# Patient Record
Sex: FEMALE | Race: WHITE
[De-identification: names, ages, dates, MRNs, and addresses within clinical notes are randomized per-mention and may not be internally consistent; named-entity substitution may affect disease eponyms.]

---

## 2017-09-10 ENCOUNTER — HOSPITAL ENCOUNTER (INPATIENT)
Dept: HOSPITAL 45 - C.2T | Age: 72
LOS: 14 days | Discharge: HOME | DRG: 167 | End: 2017-09-24
Attending: HOSPITALIST | Admitting: HOSPITALIST
Payer: COMMERCIAL

## 2017-09-10 VITALS
BODY MASS INDEX: 34.61 KG/M2 | BODY MASS INDEX: 34.61 KG/M2 | HEIGHT: 63 IN | WEIGHT: 195.33 LBS | WEIGHT: 195.33 LBS | HEIGHT: 63 IN

## 2017-09-10 VITALS
HEART RATE: 76 BPM | OXYGEN SATURATION: 93 % | SYSTOLIC BLOOD PRESSURE: 125 MMHG | TEMPERATURE: 99.14 F | DIASTOLIC BLOOD PRESSURE: 79 MMHG

## 2017-09-10 VITALS
TEMPERATURE: 98.24 F | DIASTOLIC BLOOD PRESSURE: 77 MMHG | SYSTOLIC BLOOD PRESSURE: 124 MMHG | HEART RATE: 69 BPM | OXYGEN SATURATION: 98 %

## 2017-09-10 DIAGNOSIS — Z79.02: ICD-10-CM

## 2017-09-10 DIAGNOSIS — Z79.82: ICD-10-CM

## 2017-09-10 DIAGNOSIS — Z82.49: ICD-10-CM

## 2017-09-10 DIAGNOSIS — E87.6: ICD-10-CM

## 2017-09-10 DIAGNOSIS — Z88.5: ICD-10-CM

## 2017-09-10 DIAGNOSIS — J44.0: ICD-10-CM

## 2017-09-10 DIAGNOSIS — J85.0: ICD-10-CM

## 2017-09-10 DIAGNOSIS — J90: ICD-10-CM

## 2017-09-10 DIAGNOSIS — J85.2: Primary | ICD-10-CM

## 2017-09-10 DIAGNOSIS — I25.10: ICD-10-CM

## 2017-09-10 DIAGNOSIS — Z88.2: ICD-10-CM

## 2017-09-10 DIAGNOSIS — E87.1: ICD-10-CM

## 2017-09-10 DIAGNOSIS — Z79.899: ICD-10-CM

## 2017-09-10 DIAGNOSIS — Z88.0: ICD-10-CM

## 2017-09-10 DIAGNOSIS — Z82.5: ICD-10-CM

## 2017-09-10 DIAGNOSIS — I11.9: ICD-10-CM

## 2017-09-10 DIAGNOSIS — K21.9: ICD-10-CM

## 2017-09-10 DIAGNOSIS — J93.12: ICD-10-CM

## 2017-09-10 DIAGNOSIS — Z95.1: ICD-10-CM

## 2017-09-10 DIAGNOSIS — F17.210: ICD-10-CM

## 2017-09-10 DIAGNOSIS — Z88.1: ICD-10-CM

## 2017-09-10 DIAGNOSIS — Z83.3: ICD-10-CM

## 2017-09-10 DIAGNOSIS — E78.00: ICD-10-CM

## 2017-09-10 DIAGNOSIS — M79.89: ICD-10-CM

## 2017-09-10 LAB
ALBUMIN/GLOB SERPL: 1 {RATIO} (ref 0.9–2)
ALP SERPL-CCNC: 102 U/L (ref 45–117)
ALT SERPL-CCNC: 15 U/L (ref 12–78)
ANION GAP SERPL CALC-SCNC: 8 MMOL/L (ref 3–11)
AST SERPL-CCNC: 13 U/L (ref 15–37)
BASOPHILS # BLD: 0.04 K/UL (ref 0–0.2)
BASOPHILS NFR BLD: 0.3 %
BUN SERPL-MCNC: 7 MG/DL (ref 7–18)
BUN/CREAT SERPL: 9.2 (ref 10–20)
CALCIUM SERPL-MCNC: 9.1 MG/DL (ref 8.5–10.1)
CHLORIDE SERPL-SCNC: 99 MMOL/L (ref 98–107)
CO2 SERPL-SCNC: 29 MMOL/L (ref 21–32)
COMPLETE: YES
CREAT CL PREDICTED SERPL C-G-VRATE: 69.2 ML/MIN
CREAT SERPL-MCNC: 0.78 MG/DL (ref 0.6–1.2)
EOSINOPHIL NFR BLD AUTO: 328 K/UL (ref 130–400)
GLOBULIN SER-MCNC: 3.3 GM/DL (ref 2.5–4)
GLUCOSE SERPL-MCNC: 121 MG/DL (ref 70–99)
HCT VFR BLD CALC: 37.4 % (ref 37–47)
IG%: 0.3 %
IMM GRANULOCYTES NFR BLD AUTO: 15 %
INR PPP: 1 (ref 0.9–1.1)
LYMPHOCYTES # BLD: 2.3 K/UL (ref 1.2–3.4)
MCH RBC QN AUTO: 31.9 PG (ref 25–34)
MCHC RBC AUTO-ENTMCNC: 34.5 G/DL (ref 32–36)
MCV RBC AUTO: 92.3 FL (ref 80–100)
MONOCYTES NFR BLD: 7.7 %
NEUTROPHILS # BLD AUTO: 2.5 %
NEUTROPHILS NFR BLD AUTO: 74.2 %
PMV BLD AUTO: 9.2 FL (ref 7.4–10.4)
POTASSIUM SERPL-SCNC: 3.2 MMOL/L (ref 3.5–5.1)
PROTHROMBIN TIME: 10.7 SECONDS (ref 9–12)
RBC # BLD AUTO: 4.05 M/UL (ref 4.2–5.4)
SODIUM SERPL-SCNC: 136 MMOL/L (ref 136–145)
WBC # BLD AUTO: 15.33 K/UL (ref 4.8–10.8)

## 2017-09-10 RX ADMIN — FLUTICASONE PROPIONATE AND SALMETEROL SCH PUFF: 50; 250 POWDER RESPIRATORY (INHALATION) at 22:18

## 2017-09-10 RX ADMIN — LORAZEPAM PRN MG: 1 TABLET ORAL at 19:54

## 2017-09-10 RX ADMIN — METOPROLOL TARTRATE SCH MG: 50 TABLET, FILM COATED ORAL at 22:00

## 2017-09-10 NOTE — HISTORY AND PHYSICAL
History & Physical


Date & Time of Service:


Sep 10, 2017 at 21:56


Chief Complaint:


Pneumothorax, Chest Tube, L Lung Absess


Primary Care Physician:


Heather Cheney


History of Present Illness


Source:  patient, clinic records, hospital records


71 year old female with history of CAD/CABG, HTN, COPD presenting with left 

sided chest pain.





Patient was at her usual state of health until a few weeks ago when she started 

to have dry cough.


She was then prescribed by her PCP a course of Prednisone and Azithromycin. As 

per patient, her symptoms have improved since then


but then had increasing cough the past few days.


Last night, the patient was coughing and suddenly had left sided chest pain - 

felt a like a pulled muscle so she applied Aspercream with some improvement.


This morning the pain and cough persisted prompting consult at the [facility].


She was found to have by CXR a left sided pneumothorax with left loculated 

effusion.


A chest tube was placed and patient requested transfer to [facility].





Patient received with stable VS, more than 90% on 2 liters nasal cannula.


Repeat CXR showed :  Left-sided chest tube placement with the tube possibly 

outside of


the chest wall cavity in the left. Kink of the tube at its superior margin.


Considerable subcutaneous emphysematous change. Parenchymal infiltrate left


base. This tube should be repositioned. 





On exam, patient seen resting in bed, not in respiratory distress, no accessory 

muscles, speaks in sentences with no effort.


Main symptom is pain on the chest tube insertion site.


Denies other symptoms





Past Medical/Surgical History


Medical Problems:


(1) Bronchitis


Status: Chronic  





(2) Heart disease


Status: Chronic  





(3) Hypertension


Status: Chronic  





(4) Lumbar stenosis with neurogenic claudication


Status: Resolved  





(5) Post-operative pain


Status: Resolved  





(6) Stomach problems


Status: Chronic  





Surgical Problems:


(1) H/O heart surgery


Status: Resolved  





(2) H/O laminectomy


Status: Resolved  





(3) H/O: hysterectomy


Status: Resolved  











Family History





FH: diabetes mellitus


FH: heart disease


FH: hypertension


FH: lung disease





Social History


Smoking Status:  Current Every Day Smoker


Smokeless Tobacco Use:  No


Alcohol Use:  none


Drug Use:  none


Marital Status:  


Housing status:  lives with family


Occupational Status:  retired





Allergies


Coded Allergies:  


     Cephalexin (Verified  Allergy, Mild, Hives, 8/3/15)


     Adhesives (Verified  Allergy, Unknown, RASH, 8/3/15)


     Penicillins (Verified  Allergy, Unknown, ., 8/3/15)


     Sulfa Antibiotics (Verified  Allergy, Unknown, ., 8/3/15)


     Vancomycin (Verified  Allergy, Unknown, RASH, 9/10/17)


     Oxycodone (Verified  Adverse Reaction, Unknown, itchy TO ROXICET. Takes 

oxycodone at home., 9/10/17)





Home Medications


Scheduled


Amlodipine (Norvasc), 2.5 MG PO QAM


Aspirin (Aspir-81), 81 MG PO HS


Atorvastatin (Lipitor), 10 MG PO HS


Budesonide/Formoterol Fumarate (Symbicort 160/4.5 Inhaler ), 2 PUFFS INH BID


Cholecalciferol (Vitamin D3), 1 TAB PO DAILY


Clopidogrel (Plavix), 75 MG PO QAM


Docusate Sodium (Stool Softener), 200 MG PO DAILY


Esomeprazole Magnesium (Nexium), 40 MG PO DAILY


Gabapentin (Gabapentin), 1 CAP PO QID


Ipratropium Bromide (Ipratropium Bromide), 1 VIAL NEB QID


Losartan Potassium (Cozaar), 100 MG PO QAM


Magnesium Oxide (Mag-Ox), 400 MG PO BID


Metoprolol Tartrate (Lopressor) (Lopressor), 50 MG PO BID


Montelukast Sodium (Singulair), 10 MG PO HS


Multivitamin (Multivitamin), 1 TAB PO QAM


Ranitidine (Zantac), 150 MG PO HS





Scheduled PRN


Albuterol (Proair Hfa), 2 PUFFS INH Q4 PRN for SOB/Wheezing


Alprazolam (Alprazolam), 0.5 MG PO HS PRN for Anxiety


Carisoprodol (Soma), 350 MG PO TID PRN for Muscle Spasms


Oxycodone HCl (Oxycodone HCl), 5 MG PO Q4H PRN for moderate pain (pain scale 4-6

)





Miscellaneous Medications


Tiotropium Bromide (Spiriva Respimat), 1 PUFF INH





Review of Systems


Constitutional- no fever; no weight loss


Eyes- no acute visual changes


ENT- no sinus drainage; no pharyngitis


Pulmonary- (+) as noted above


Cardiac- no chest pain, no palpitations, no orthopnea, no dependent edema


GI- no nausea, no vomiting, no diarrhea, no melena, no hematochezia


- no dysuria, no hematuria 


Musculoskeletal- no arthralgias, no myalgias


Derm- no rashes, no new skin lesions, no changing skin lesions


Hematologic- no unusual bruising, no unusual bleeding 


Lymphatics- no adenopathy 


Endocrine- no polyuria or polydipsia; no heat or cold intolerance


Neuro- no headaches, no focal neurologic symptoms


Psych- no anxiety, no depression





Physical Exam


Vital Signs











  Date Time  Temp Pulse Resp B/P (MAP) Pulse Ox O2 Delivery O2 Flow Rate FiO2


 


9/10/17 19:32 36.8 69 22 124/77 98 Nasal Cannula 2.0 


 


9/10/17 19:10 37.3 76 21 125/79 (94) 93 Nasal Cannula 3.5 








General Appearance:  WD/WN, no apparent distress


Head:  normocephalic, atraumatic


Eyes:  normal inspection, EOMI, sclerae normal


ENT:  normal ENT inspection, hearing grossly normal, pharynx normal


Neck:  supple, no adenopathy, thyroid normal, no JVD, trachea midline


Respiratory/Chest:  + pertinent finding ((+) chest tube inserted on the left 

chest wall, no signs of active bleeding; (+) mild wheeze and rales bilateral 

bases right >left)


Cardiovascular:  regular rate, rhythm, no edema, no JVD, no murmur


Abdomen/GI:  normal bowel sounds, non tender, soft, no organomegaly


Back:  normal inspection, no CVA tenderness


Extremities/Musculoskelatal:  normal inspection, no calf tenderness, normal 

capillary refill, no pedal edema


Neurologic/Psych:  CNs II-XII nml as tested, no motor/sensory deficits, alert, 

normal mood/affect, oriented x 3


Skin:  normal color, warm/dry, no rash


Lymphatic:  no adenopathy





Diagnostics


Laboratory Results





Results Past 24 Hours








Test


  9/10/17


19:02 9/10/17


19:31 9/10/17


19:37 Range/Units


 


 


White Blood Count  15.33  4.8-10.8  K/uL


 


Red Blood Count  4.05  4.2-5.4  M/uL


 


Hemoglobin  12.9  12.0-16.0  g/dL


 


Hematocrit  37.4  37-47  %


 


Mean Corpuscular Volume  92.3    fL


 


Mean Corpuscular Hemoglobin  31.9  25-34  pg


 


Mean Corpuscular Hemoglobin


Concent 


  34.5


  


  32-36  g/dl


 


 


Platelet Count  328  130-400  K/uL


 


Mean Platelet Volume  9.2  7.4-10.4  fL


 


Neutrophils (%) (Auto)  74.2   %


 


Lymphocytes (%) (Auto)  15.0   %


 


Monocytes (%) (Auto)  7.7   %


 


Eosinophils (%) (Auto)  2.5   %


 


Basophils (%) (Auto)  0.3   %


 


Neutrophils # (Auto)  11.38  1.4-6.5  K/uL


 


Lymphocytes # (Auto)  2.30  1.2-3.4  K/uL


 


Monocytes # (Auto)  1.18  0.11-0.59  K/uL


 


Eosinophils # (Auto)  0.38  0-0.5  K/uL


 


Basophils # (Auto)  0.04  0-0.2  K/uL


 


RDW Standard Deviation  45.3  36.4-46.3  fL


 


RDW Coefficient of Variation  13.4  11.5-14.5  %


 


Immature Granulocyte % (Auto)  0.3   %


 


Immature Granulocyte # (Auto)  0.05  0.00-0.02  K/uL


 


Prothrombin Time


  


  10.7


  


  9.0-12.0


SECONDS


 


Prothromb Time International


Ratio 


  1.0


  


  0.9-1.1  


 


 


Sodium Level  136  136-145  mmol/L


 


Potassium Level  3.2  3.5-5.1  mmol/L


 


Chloride Level  99    mmol/L


 


Carbon Dioxide Level  29  21-32  mmol/L


 


Anion Gap  8.0  3-11  mmol/L


 


Blood Urea Nitrogen  7  7-18  mg/dl


 


Creatinine


  


  0.78


  


  0.60-1.20


mg/dl


 


Est Creatinine Clear Calc


Drug Dose 


  69.2


  


   ml/min


 


 


Estimated GFR (


American) 


  89.3


  


   


 


 


Estimated GFR (Non-


American 


  77.0


  


   


 


 


BUN/Creatinine Ratio  9.2  10-20  


 


Random Glucose  121  70-99  mg/dl


 


Calcium Level  9.1  8.5-10.1  mg/dl


 


Total Bilirubin  0.6  0.2-1  mg/dl


 


Aspartate Amino Transf


(AST/SGOT) 


  13


  


  15-37  U/L


 


 


Alanine Aminotransferase


(ALT/SGPT) 


  15


  


  12-78  U/L


 


 


Alkaline Phosphatase  102    U/L


 


Total Protein  6.7  6.4-8.2  gm/dl


 


Albumin  3.4  3.4-5.0  gm/dl


 


Globulin  3.3  2.5-4.0  gm/dl


 


Albumin/Globulin Ratio  1.0  0.9-2  


 


Lactic Acid Level   1.1 0.4-2.0  mmol/L








Microbiology Results


9/10/17 Blood Culture, Received


          Pending


9/10/17 Blood Culture, Received


          Pending


9/10/17 MRSA DNA Surveillance Screen, Ordered


          Pending


9/10/17 Gram Stain, Ordered


          Pending


9/10/17 Sputum Culture, Ordered


          Pending





Diagnostic Radiology


per H&P





Impression


Assessment and Plan


71 year old female with history of CAD/CABG, HTN, COPD presenting with left 

sided chest pain.





LEFT SIDED PNEUMOTHORAX


LEFT LOWER LOBE PNEUMONIA VS. EFFUSION


- HD stable, maintaining adequate oxygenation on 2 liters NC  


 no dyspnea


- may need chest tube removal and reinsertion


- discussed with Dr. Frazier


  CT chest stat ordered


- will start Doxy + Aztreonam, Solumedrol 40mg q8h, Nebs q4h


- monitor in Tele





COPD


- possible mild COPD exacerbation


- management as noted above





CAD/CABG


- hold ASA, Plavix for chest tube insertion


- continue Losartan, Metoprolol


- hold Lasix for now





HTN


- continue Losartan, Metoprolol


- hold Lasix for now





DVT PROPHYLAXIS


SCDs for now





FULL CODE PER PATIENT





DISPOSITION


anticipate d/c home when medically stable





Advanced Directives


Existing Living Will:  No


Existing Power of :  No





VTE Prophylaxis


VTE Risk Assessment Done? Y/N:  Yes


Risk Level:  Moderate


Given or contraindicated:  SCD's

## 2017-09-10 NOTE — DIAGNOSTIC IMAGING REPORT
(CHEST) THORAX WITHOUT



CT DOSE: 527.16 mGy.cm



HISTORY: Tube position  pneumothorax, s/p chest tube placement



TECHNIQUE: Multiaxial CT images of the chest were performed without contrast.  A

dose lowering technique was utilized adhering to the principles of ALARA.





COMPARISON: None.



FINDINGS: There is a left-sided chest tube. The tube is extrinsic to the left

chest wall. It contains a kink in its distal aspect as well as a kink medially

deep to its insertion site. The entirety of the catheter is within the chest

wall laterally. This catheter should be removed.



There is a left-sided pneumothorax as well as a small left effusion. Estimated

volume is approximately 30%. There is no significant cardiomediastinal

silhouette shift.



IMPRESSION: 



1. Left-sided chest tube is located entirely within the subcutaneous fat and

soft tissues of the chest wall itself..

2. There is no evidence for extension of any component of tube to the chest

cavity itself.

3. 30% left-sided pneumothorax.





4. Extensive subcutaneous emphysema over the left hemithorax.





5. The chest tube within the soft tissues contains at least 2 kinks

6. This tube should be removed

7. Small left effusion with partial left lower lobe atelectatic change. 









The above report was generated using voice recognition software.  It may contain

grammatical, syntax or spelling errors.







Electronically signed by:  Brice Oreilly M.D.

9/10/2017 9:59 PM



Dictated Date/Time:  9/10/2017 9:55 PM

## 2017-09-10 NOTE — DIAGNOSTIC IMAGING REPORT
CHEST ONE VIEW PORTABLE



CLINICAL HISTORY: sob /CHEST TUBE PLACEMENT  dyspnea. Tube position.



COMPARISON STUDY:  5/20/2015



FINDINGS: Placement of a left-sided chest tube. Bulk of the tube appears to be

extrathoracic. There is a kink in the tubing superior margin adjacent to the

anterior aspect of left second rib. There is no evidence pneumothorax.

Subcutaneous emphysematous changes are noted.. There is a parenchymal infiltrate

left lung base. Lungs otherwise appear clear. 



IMPRESSION:  Left-sided chest tube placement with the tube possibly outside of

the chest wall cavity in the left. Kink of the tube at its superior margin.

Considerable subcutaneous emphysematous change. Parenchymal infiltrate left

base. This tube should be repositioned. 











The above report was generated using voice recognition software.  It may contain

grammatical, syntax or spelling errors.









Electronically signed by:  Brice Oreilly M.D.

9/10/2017 7:39 PM



Dictated Date/Time:  9/10/2017 7:37 PM

## 2017-09-11 VITALS
DIASTOLIC BLOOD PRESSURE: 84 MMHG | SYSTOLIC BLOOD PRESSURE: 131 MMHG | TEMPERATURE: 97.88 F | HEART RATE: 83 BPM | OXYGEN SATURATION: 94 %

## 2017-09-11 VITALS
HEART RATE: 74 BPM | TEMPERATURE: 98.24 F | OXYGEN SATURATION: 96 % | SYSTOLIC BLOOD PRESSURE: 154 MMHG | DIASTOLIC BLOOD PRESSURE: 76 MMHG

## 2017-09-11 VITALS
TEMPERATURE: 98.96 F | SYSTOLIC BLOOD PRESSURE: 146 MMHG | DIASTOLIC BLOOD PRESSURE: 87 MMHG | HEART RATE: 88 BPM | OXYGEN SATURATION: 95 %

## 2017-09-11 VITALS
DIASTOLIC BLOOD PRESSURE: 82 MMHG | OXYGEN SATURATION: 97 % | TEMPERATURE: 98.06 F | HEART RATE: 68 BPM | SYSTOLIC BLOOD PRESSURE: 148 MMHG

## 2017-09-11 VITALS — OXYGEN SATURATION: 98 % | HEART RATE: 76 BPM

## 2017-09-11 VITALS
HEART RATE: 81 BPM | TEMPERATURE: 98.42 F | OXYGEN SATURATION: 97 % | SYSTOLIC BLOOD PRESSURE: 168 MMHG | DIASTOLIC BLOOD PRESSURE: 70 MMHG

## 2017-09-11 VITALS
DIASTOLIC BLOOD PRESSURE: 79 MMHG | HEART RATE: 78 BPM | OXYGEN SATURATION: 97 % | SYSTOLIC BLOOD PRESSURE: 125 MMHG | TEMPERATURE: 98.06 F

## 2017-09-11 VITALS — OXYGEN SATURATION: 92 % | HEART RATE: 82 BPM

## 2017-09-11 VITALS — OXYGEN SATURATION: 97 %

## 2017-09-11 VITALS
SYSTOLIC BLOOD PRESSURE: 135 MMHG | TEMPERATURE: 97.88 F | HEART RATE: 76 BPM | DIASTOLIC BLOOD PRESSURE: 77 MMHG | OXYGEN SATURATION: 92 %

## 2017-09-11 VITALS — HEART RATE: 83 BPM | OXYGEN SATURATION: 91 %

## 2017-09-11 VITALS — HEART RATE: 70 BPM | OXYGEN SATURATION: 94 %

## 2017-09-11 VITALS
OXYGEN SATURATION: 95 % | TEMPERATURE: 98.06 F | DIASTOLIC BLOOD PRESSURE: 74 MMHG | HEART RATE: 70 BPM | SYSTOLIC BLOOD PRESSURE: 124 MMHG

## 2017-09-11 VITALS — OXYGEN SATURATION: 95 % | HEART RATE: 73 BPM

## 2017-09-11 VITALS — HEART RATE: 70 BPM | OXYGEN SATURATION: 97 %

## 2017-09-11 LAB
ANION GAP SERPL CALC-SCNC: 7 MMOL/L (ref 3–11)
APPEARANCE UR: CLEAR
BILIRUB UR-MCNC: (no result) MG/DL
BUN SERPL-MCNC: 10 MG/DL (ref 7–18)
BUN/CREAT SERPL: 12.4 (ref 10–20)
CALCIUM SERPL-MCNC: 9.1 MG/DL (ref 8.5–10.1)
CHLORIDE SERPL-SCNC: 98 MMOL/L (ref 98–107)
CO2 SERPL-SCNC: 29 MMOL/L (ref 21–32)
COLOR UR: YELLOW
CREAT CL PREDICTED SERPL C-G-VRATE: 67.4 ML/MIN
CREAT SERPL-MCNC: 0.8 MG/DL (ref 0.6–1.2)
EOSINOPHIL NFR BLD AUTO: 323 K/UL (ref 130–400)
GLUCOSE SERPL-MCNC: 169 MG/DL (ref 70–99)
HCT VFR BLD CALC: 38.2 % (ref 37–47)
MAGNESIUM SERPL-MCNC: 1.8 MG/DL (ref 1.8–2.4)
MANUAL MICROSCOPIC REQUIRED?: NO
MCH RBC QN AUTO: 30.6 PG (ref 25–34)
MCHC RBC AUTO-ENTMCNC: 33.5 G/DL (ref 32–36)
MCV RBC AUTO: 91.4 FL (ref 80–100)
NITRITE UR QL STRIP: (no result)
PH UR STRIP: 5.5 [PH] (ref 4.5–7.5)
PMV BLD AUTO: 9.2 FL (ref 7.4–10.4)
POTASSIUM SERPL-SCNC: 3.6 MMOL/L (ref 3.5–5.1)
RBC # BLD AUTO: 4.18 M/UL (ref 4.2–5.4)
REVIEW REQ?: NO
SODIUM SERPL-SCNC: 134 MMOL/L (ref 136–145)
SP GR UR STRIP: 1.02 (ref 1–1.03)
URINE BILL WITH OR WITHOUT MIC: (no result)
UROBILINOGEN UR-MCNC: (no result) MG/DL
WBC # BLD AUTO: 14.28 K/UL (ref 4.8–10.8)
ZZUR CULT IF INDIC CLEAN CATCH: NO

## 2017-09-11 PROCEDURE — 0W9830Z DRAINAGE OF CHEST WALL WITH DRAINAGE DEVICE, PERCUTANEOUS APPROACH: ICD-10-PCS | Performed by: INTERNAL MEDICINE

## 2017-09-11 RX ADMIN — LOSARTAN POTASSIUM SCH MG: 50 TABLET, FILM COATED ORAL at 08:27

## 2017-09-11 RX ADMIN — MONTELUKAST SODIUM SCH MG: 10 TABLET, FILM COATED ORAL at 00:35

## 2017-09-11 RX ADMIN — ATORVASTATIN CALCIUM SCH MG: 10 TABLET, FILM COATED ORAL at 21:23

## 2017-09-11 RX ADMIN — LEVALBUTEROL SCH MG: 1.25 SOLUTION, CONCENTRATE RESPIRATORY (INHALATION) at 03:20

## 2017-09-11 RX ADMIN — Medication SCH TAB: at 08:25

## 2017-09-11 RX ADMIN — FLUTICASONE PROPIONATE AND SALMETEROL SCH PUFF: 50; 250 POWDER RESPIRATORY (INHALATION) at 21:00

## 2017-09-11 RX ADMIN — BUPROPION HYDROCHLORIDE SCH MG: 100 TABLET, FILM COATED, EXTENDED RELEASE ORAL at 00:40

## 2017-09-11 RX ADMIN — LEVALBUTEROL SCH MG: 1.25 SOLUTION, CONCENTRATE RESPIRATORY (INHALATION) at 23:07

## 2017-09-11 RX ADMIN — METOPROLOL TARTRATE SCH MG: 50 TABLET, FILM COATED ORAL at 15:18

## 2017-09-11 RX ADMIN — OXYCODONE HYDROCHLORIDE PRN MG: 5 TABLET ORAL at 08:21

## 2017-09-11 RX ADMIN — IMIPENEM AND CILASTATIN SODIUM SCH MLS/HR: 500; 500 INJECTION, POWDER, FOR SOLUTION INTRAVENOUS at 22:31

## 2017-09-11 RX ADMIN — RANITIDINE SCH MG: 150 TABLET ORAL at 21:23

## 2017-09-11 RX ADMIN — OXYCODONE HYDROCHLORIDE PRN MG: 5 TABLET ORAL at 17:56

## 2017-09-11 RX ADMIN — GABAPENTIN SCH MG: 300 CAPSULE ORAL at 15:18

## 2017-09-11 RX ADMIN — BUPROPION HYDROCHLORIDE SCH MG: 100 TABLET, FILM COATED, EXTENDED RELEASE ORAL at 21:00

## 2017-09-11 RX ADMIN — OXYCODONE HYDROCHLORIDE PRN MG: 5 TABLET ORAL at 22:45

## 2017-09-11 RX ADMIN — METOPROLOL TARTRATE SCH MG: 50 TABLET, FILM COATED ORAL at 00:34

## 2017-09-11 RX ADMIN — MONTELUKAST SODIUM SCH MG: 10 TABLET, FILM COATED ORAL at 21:23

## 2017-09-11 RX ADMIN — KETOROLAC TROMETHAMINE SCH MG: 15 INJECTION INTRAMUSCULAR; INTRAVENOUS at 17:57

## 2017-09-11 RX ADMIN — BUDESONIDE AND FORMOTEROL FUMARATE DIHYDRATE SCH PUFFS: 80; 4.5 AEROSOL RESPIRATORY (INHALATION) at 15:22

## 2017-09-11 RX ADMIN — ATORVASTATIN CALCIUM SCH MG: 10 TABLET, FILM COATED ORAL at 00:36

## 2017-09-11 RX ADMIN — LEVALBUTEROL SCH MG: 1.25 SOLUTION, CONCENTRATE RESPIRATORY (INHALATION) at 07:13

## 2017-09-11 RX ADMIN — Medication SCH MG: at 00:35

## 2017-09-11 RX ADMIN — FLUTICASONE PROPIONATE AND SALMETEROL SCH PUFF: 50; 250 POWDER RESPIRATORY (INHALATION) at 08:21

## 2017-09-11 RX ADMIN — LEVALBUTEROL SCH MG: 1.25 SOLUTION, CONCENTRATE RESPIRATORY (INHALATION) at 20:21

## 2017-09-11 RX ADMIN — GABAPENTIN SCH MG: 300 CAPSULE ORAL at 21:23

## 2017-09-11 RX ADMIN — IPRATROPIUM BROMIDE SCH MG: 0.5 SOLUTION RESPIRATORY (INHALATION) at 07:13

## 2017-09-11 RX ADMIN — LORAZEPAM PRN MG: 1 TABLET ORAL at 21:40

## 2017-09-11 RX ADMIN — KETOROLAC TROMETHAMINE SCH MG: 15 INJECTION INTRAMUSCULAR; INTRAVENOUS at 12:18

## 2017-09-11 RX ADMIN — TIOTROPIUM BROMIDE SCH PUFF: 18 CAPSULE ORAL; RESPIRATORY (INHALATION) at 08:28

## 2017-09-11 RX ADMIN — IPRATROPIUM BROMIDE SCH MG: 0.5 SOLUTION RESPIRATORY (INHALATION) at 03:20

## 2017-09-11 RX ADMIN — OXYCODONE HYDROCHLORIDE PRN MG: 5 TABLET ORAL at 01:31

## 2017-09-11 RX ADMIN — IMIPENEM AND CILASTATIN SODIUM SCH MLS/HR: 500; 500 INJECTION, POWDER, FOR SOLUTION INTRAVENOUS at 15:22

## 2017-09-11 RX ADMIN — DOCUSATE SODIUM SCH MG: 100 CAPSULE, LIQUID FILLED ORAL at 08:26

## 2017-09-11 RX ADMIN — IPRATROPIUM BROMIDE SCH MG: 0.5 SOLUTION RESPIRATORY (INHALATION) at 11:10

## 2017-09-11 RX ADMIN — METOPROLOL TARTRATE SCH MG: 50 TABLET, FILM COATED ORAL at 08:24

## 2017-09-11 RX ADMIN — BUDESONIDE AND FORMOTEROL FUMARATE DIHYDRATE SCH PUFFS: 80; 4.5 AEROSOL RESPIRATORY (INHALATION) at 21:23

## 2017-09-11 RX ADMIN — LEVALBUTEROL SCH MG: 1.25 SOLUTION, CONCENTRATE RESPIRATORY (INHALATION) at 11:10

## 2017-09-11 RX ADMIN — KETOROLAC TROMETHAMINE SCH MG: 15 INJECTION INTRAMUSCULAR; INTRAVENOUS at 06:16

## 2017-09-11 RX ADMIN — GABAPENTIN SCH MG: 300 CAPSULE ORAL at 08:24

## 2017-09-11 RX ADMIN — IPRATROPIUM BROMIDE SCH MG: 0.5 SOLUTION RESPIRATORY (INHALATION) at 20:21

## 2017-09-11 RX ADMIN — RANITIDINE SCH MG: 150 TABLET ORAL at 00:34

## 2017-09-11 RX ADMIN — GABAPENTIN SCH MG: 300 CAPSULE ORAL at 00:35

## 2017-09-11 RX ADMIN — Medication SCH MG: at 21:23

## 2017-09-11 RX ADMIN — NICOTINE SCH PATCH: 7 PATCH, EXTENDED RELEASE TRANSDERMAL at 08:29

## 2017-09-11 RX ADMIN — BUPROPION HYDROCHLORIDE SCH MG: 100 TABLET, FILM COATED, EXTENDED RELEASE ORAL at 08:23

## 2017-09-11 RX ADMIN — PANTOPRAZOLE SCH MG: 40 TABLET, DELAYED RELEASE ORAL at 08:25

## 2017-09-11 RX ADMIN — BUPROPION HYDROCHLORIDE SCH MG: 100 TABLET, FILM COATED, EXTENDED RELEASE ORAL at 00:34

## 2017-09-11 RX ADMIN — IPRATROPIUM BROMIDE SCH MG: 0.5 SOLUTION RESPIRATORY (INHALATION) at 23:07

## 2017-09-11 RX ADMIN — LEVALBUTEROL SCH MG: 1.25 SOLUTION, CONCENTRATE RESPIRATORY (INHALATION) at 15:26

## 2017-09-11 RX ADMIN — Medication SCH MG: at 08:24

## 2017-09-11 RX ADMIN — IPRATROPIUM BROMIDE SCH MG: 0.5 SOLUTION RESPIRATORY (INHALATION) at 15:26

## 2017-09-11 RX ADMIN — MORPHINE SULFATE PRN MG: 4 INJECTION, SOLUTION INTRAMUSCULAR; INTRAVENOUS at 21:39

## 2017-09-11 NOTE — INFECT. DISEASE CONSULTATION
DATE OF CONSULTATION:  09/11/2017

 

REQUESTING PHYSICIAN:  Dr. Valdez.

 

HISTORY OF PRESENT ILLNESS:  This is a 71-year-old female who was transferred

here from the Emergency Room at [facility].  She states that she

recently had worsening cough and shortness of breath.  She was followed by

her primary care physician and her COPD medications were changed to Spiriva

and Symbicort.  She did have some sore throat over the past few days and she

did follow up with her family physician, who put her on a prednisone taper as

well as a Z-AYALA.  She was tolerating this well when she had worsening cough

yesterday and had a one-time episode of hemoptysis.  She called her daughter

who is a nurse and it was suggested that she go to the hospital for further

evaluation.  It appears that she did have imaging while in the hospital at

[place] which did show effusion on x-ray which I do not have records for. 

For this reason, it was decided that a chest tube would be placed on the left

side.  This was done at [facility] yesterday.  After this was done,

she had worsening pain which radiates to the back and to the shoulder. 

Because of this, a chest x-ray was repeated which showed pneumothorax.  She

then was transferred to [facility] for additional care. 

She did undergo a CAT scan of the chest here which showed 30% pneumothorax

and a chest tube that was kinked.  She was followed by pulmonary and the

chest tube was removed and a new chest tube was placed and the fluid was sent

for culture, results of this are pending.  She was placed empirically on

imipenem and vancomycin.  She has been afebrile and denies having any fevers

prior to admission to the hospital.  Yesterday, she did have a white blood

cell count of 15 and this has improved to 14.  On my examination, she is out

of bed to chair and states overall she is feeling much better.  She does

admit to having some residual pain in her left shoulder, but otherwise states

she feels well.  She continues to deny any fevers or chills.  She has had no

additional cough or hemoptysis.  She denies any chest pain or pleuritic chest

pain.  Her appetite has been stable at home.  She has not had any sick

contacts.  She denies any nausea, vomiting or diarrhea.  All remaining review

of systems is reviewed and is unremarkable.  She appears to be tolerating

antibiotics well.

 

PAST MEDICAL HISTORY:  Significant for chronic bronchitis, coronary artery

disease, hypertension, COPD.

 

PAST SURGICAL HISTORY:  Significant for CABG, laminectomy and hysterectomy. 

She has also had tonsillectomy many years ago.

 

FAMILY HISTORY:  Noncontributory.

 

SOCIAL HISTORY:  Significant for daily tobacco use.  She denies any alcohol

or drug use.  She is  and lives with her .  She denies any

recent sick contacts.

 

ALLERGIES:  She HAS ALLERGIES TO CEPHALOSPORINS, ADHESIVE TAPE, PENICILLIN,

SULFA, VANCOMYCIN; however, she did tolerate this AND OXYCODONE.

 

CURRENT MEDICATIONS:  Include nicotine patch, Lopressor, Norvasc, Colace,

Cozaar, multivitamins, calcium, vitamin D, Protonix, Spiriva, imipenem,

Toradol, morphine, Atrovent, Xopenex, Lipitor, Wellbutrin, Advair, Neurontin,

magnesium, Singulair, Zantac, Dilaudid, Tylenol, Maalox, milk of magnesia,

Ambien, MiraLax, albuterol, Xanax, Soma, Percocet, Ativan, Roxicodone and

Ultram.

 

PHYSICAL EXAMINATION:

VITAL SIGNS:  She has been afebrile since admission to the hospital, pulse

83, respiratory rate 20, blood pressure is 131/84, and oxygen saturation is

94-98% on 2 liters.

GENERAL:  She is awake, alert and oriented x3.  She is in no acute distress.

HEENT:  Mucous membranes are dry.  Extraocular muscles are intact.

HEART:  Regular.

LUNGS:  Decreased on the left.  The chest tube is in place with minimal

serosanguineous fluid.

ABDOMEN:  Soft, nontender and nondistended.

EXTREMITIES:  There is no lower extremity edema bilaterally.

SKIN:  Without rash.

 

LABORATORY AND IMAGING STUDIES:  CBC today reveals a white blood cell count

of 14.2, hemoglobin 12.8 and platelets of 323.  Chemistry panel reveals

sodium of 134, potassium 3.6, chloride 98, bicarbonate 29, BUN 10, creatinine

0.8 and glucose is 169.  LFTs are within normal limits.  Blood cultures from

the 10th are pending.  A specimen obtained at that time, a chest tube

exchange has moderate white blood cells but no organisms.  Chest x-ray done

this morning shows no significant change in the position.  No pneumothorax

identified.  Persistent left-sided subcutaneous emphysema and a persistent

left lower lobe infiltrate.  CAT scan of the chest done on arrival yesterday

and shows a left-sided chest tube with multiple kinks, 30% pneumothorax, no

evidence of abscess was noted at that time.

 

ASSESSMENT AND PLAN:  Effusion, question infectious in etiology as she will

be maintained on broad spectrum antibiotics pending the results of blood as

well as pleural fluid cultures.  She is currently afebrile and

hemodynamically stable, and we will follow along with you.

 

Thank you for this consultation.

## 2017-09-11 NOTE — PULMONARY CONSULTATION
History


General


Date of Service:


Sep 11, 2017.


Stated Complaint:


Pneumothorax, Chest Tube, L Lung Absess





HPI


The patient is a 71 year old female who presents to [facility] with complaints of Pneumothorax, Chest Tube, L Lung Absess. The patient'

s primary care provider is Heather Cheney.





71-year-old female who presented to [facility] with acute onset 

pleuritic-type chest pain was noted to have a spontaneous pneumothorax with 

associated pulmonary abscess.  The patient had only giving complaining of the 

pleurisy and not notable shortness of breath. At the [facility] 

chest tube was inserted in the patient was then transferred to the [facility].  On evaluation with chest x-ray on her arrival it was 

noted that the chest tube did not appear to be within the thoracic cavity but 

external to the ribs.  A noncontrast CT to was obtained in prove this to be the 

case.  I then went to see the patient in during our interview she noted left-

sided chest pain not pleuritic in nature but it did appear exacerbated with 

movement.  She denied fever, chills, pleurisy, classic cardiac chest pain, 

productive cough or unintentional weight loss over the last 2-7 days.








Workup


WBC:   15K [Neut#: 11.38 & Mono#1.18] 


Hgb:   13


Plt:   328


INR:   1.0


PT:   10.7


K+:   3.2


BUN:   7


Cr:   0.78


CXR 09/10/2017 compared to 05/20/2015   


   Subcutaneous air, left-sided chest tube kinked at the apices, questionable 

intra-thoracic placement, right hilar surgical clips, sternal wires


UA:  Pending


Microbiology pending:  MRSA nasal swab, blood x2, expectorated sputum





Review of Systems


Constitutional:  reports: weakness


Eyes:  reports: no symptoms


ENT:  reports: no symptoms


Cardiovascular:  reports: as stated in HPI


Respiratory:  reports: as stated in HPI


Gastrointestinal:  reports: no symptoms


Genitourinary - Female:  reports: no symptoms


Musculoskeletal:  reports: as stated in HPI


Integumentary:  reports: other (Dry mouth)


Neurologic:  reports: no symptoms


Psychiatric:  reports: anxiety


Hematologic / Lymphatic:  no symptoms


Allergic / Immunologic:  no symptoms





Past Medical History


Past Medical History:


1. Spinal stenosis at L4-5 and L5-S1


2.  COPD


3. Arthritis


4. Hypertension


5. GERD


6. Hypercholesterolemia


7. Seroma of the right leg


8. Atrial myxoma


Past Surgical History:


1. Atrial myxoma resection and bypass x1 vessel


2.  Back surgery 1993


3. Tonsillectomy


4. Hysterectomy


5. Lumbar decompression level L4-5 and L5-S1


6. Evacuation of seroma along with fusion and revision: L4-5 and L5-S1 (08/03/ 2015)


7. Revision with decompression: L3-4,L4-5<L5-S1 (06/17/2015)





Family History





FH: diabetes mellitus


FH: heart disease


FH: hypertension


FH: lung disease


Diabetes


Coronary artery disease


Alcoholism


Mental disorders/Suicide





Social History


Hx Tobacco Use In Past Year?:  Yes


Smoking Status:  Current Every Day Smoker


Marital status:  


Housing status:  lives with family


Occupational Status:  retired





Allergies


Coded Allergies:  


     Cephalexin (Verified  Allergy, Mild, Hives, 8/3/15)


     Adhesives (Verified  Allergy, Unknown, RASH, 8/3/15)


     Penicillins (Verified  Allergy, Unknown, ., 8/3/15)


     Sulfa Antibiotics (Verified  Allergy, Unknown, ., 8/3/15)


     Vancomycin (Verified  Allergy, Unknown, RASH, 9/10/17)


     Oxycodone (Verified  Adverse Reaction, Unknown, itchy TO ROXICET. Takes 

oxycodone at home., 9/10/17)





Current Medications





Reported Home Medications








 Medications  Dose


 Route/Sig


 Max Daily Dose Days Date Category


 


 Ipratropium


 Bromide 0.5


 Mg/2.5 Ml Nebu  1 Vial


 NEB QID


   30 9/10/17 Reported


 


 Spiriva Respimat


  (Tiotropium


 Bromide) 1.25


 Mcg/Act Aer  1 Puff


 INH


    9/10/17 Reported


 


 Symbicort 160/4.5


 Inhaler


  (Budesonide/Formoterol


 Fumarate)  Aero  2 Puffs


 INH BID


    9/10/17 Reported


 


 Vitamin D3


  (Cholecalciferol)


 2,000 Unit Tab  1 Tab


 PO DAILY


   30 9/10/17 Reported


 


 Gabapentin 300 Mg


 Cap  1 Cap


 PO QID


   30 9/10/17 Reported


 


 Oxycodone HCl 5


 Mg Tab  5 Mg


 PO Q4H PRN


    8/5/15 Rx


 


 Stool Softener


  (Docusate Sodium)


 100 Mg Cap  200 Mg


 PO DAILY


    8/3/15 Reported


 


 Nexium


  (Esomeprazole


 Magnesium) 40 Mg


 Capcr  40 Mg


 PO DAILY


    8/3/15 Reported


 


 Soma


  (Carisoprodol)


 350 Mg Tab  350 Mg


 PO TID PRN


    6/28/15 Reported


 


 Multivitamin


  (Multivitamins)


 Tab  1 Tab


 PO QAM


    5/20/15 Reported


 


 Alprazolam 0.5 Mg


 Tab  0.5 Mg


 PO HS PRN


    5/20/15 Reported


 


 Mag-Ox (Magnesium


 Oxide) 400 Mg Tab  400 Mg


 PO BID


    5/20/15 Reported


 


 Zantac


  (Ranitidine HCl)


 150 Mg Tab  150 Mg


 PO HS


    5/20/15 Reported


 


 Lipitor


  (Atorvastatin


 Calcium) 10 Mg Tab  10 Mg


 PO HS


    5/20/15 Reported


 


 Aspir-81


  (Aspirin) 81 Mg


 Tab  81 Mg


 PO HS


    5/20/15 Reported


 


 Singulair


  (Montelukast


 Sodium) 10 Mg Tab  10 Mg


 PO HS


    5/20/15 Reported


 


 Proair Hfa


  (Albuterol)  Aers  2 Puffs


 INH Q4 PRN


    5/20/15 Reported


 


 Lopressor


  (Metoprolol


 Tartrate) 50 Mg


 Tab  50 Mg


 PO BID


    5/20/15 Reported


 


 Plavix


  (Clopidogrel


 Bisulfate) 75 Mg


 Tab  75 Mg


 PO QAM


    5/20/15 Reported


 


 Cozaar (Losartan


 Potassium) 100 Mg


 Tab  100 Mg


 PO QAM


    5/20/15 Reported


 


 Norvasc


  (Amlodipine


 Besylate) 2.5 Mg


 Tab  2.5 Mg


 PO QAM


    5/20/15 Reported











Physical


Physical Exam


Vital Signs:











  Date Time  Temp Pulse Resp B/P (MAP) Pulse Ox O2 Delivery O2 Flow Rate FiO2


 


9/10/17 19:32 36.8 69 22 124/77 98 Nasal Cannula 2.0 


 


9/10/17 19:10 37.3 76 21 125/79 (94) 93 Nasal Cannula 3.5 








General Appearance:  uncomfortable, moderate distress


Head:  NORMOCEPHALIC, ATRAUMATIC


Eyes:  PERRLA, NO DISCHARGE, EOMI, SCLERAE NORMAL, CONJUNCTIVAE NORMAL


ENT:  NORMAL EAR EXAM, NORMAL NASAL EXAM, NORMAL MOUTH EXAM, NORMAL THROAT EXAM

, NORMAL DENTAL EXAM


Neck:  NORMAL RANGE OF MOTION, NO TENDERNESS, TRACHEA MIDLINE, NO STRIDOR


Respiratory:  other (Clear to auscultation on the right side, decreased breath 

sounds on the left side with large 10-12 centimeter incision approximately 7th 

intercostal space between the mid axillary and 2-3 centimeters more medial than 

the anterior axillary line.  There is no active signs of infection and no 

active bleeding)


Cardiovasular:  REGULAR RATE/RHYTHM, NORMAL S1S2, NO M/G/R, NO MURMUR, NO GALLOP


Abdomen:  NON TENDER, NORMAL BOWEL SOUNDS, NO REBOUND, NO MASSES, NO GUARDING, 

NO ORGANOMEGALY, NORMAL RECTAL EXAM


Genitourinary - Female:  EXTERNAL GENITALIA NORMAL


Back:  NORMAL INSPECTION, NO MIDLINE TENDERNESS, NO CVA TENDERNESS, NO 

PARAVERTEBRAL TTP


Upper Extremities:  NO EDEMA, NO DEFORMITY, NORMAL ROM


Lower Extremities:  NO EDEMA, NO DEFORMITY, NORMAL ROM


Pulses:  carotid (R) (2+), carotid (L) (2+), dorsalis pedis (R) (2+), dorsalis 

pedis (L) (2+)


Neuro:  ALERT, ORIENTED x 3, NORMAL MOTOR EXAM, NORMAL SENSATION





Diagnostics


Labs





Results Past 24 Hours








Test


  9/10/17


19:31 9/10/17


19:37 Range/Units


 


 


White Blood Count 15.33  4.8-10.8  K/uL


 


Red Blood Count 4.05  4.2-5.4  M/uL


 


Hemoglobin 12.9  12.0-16.0  g/dL


 


Hematocrit 37.4  37-47  %


 


Mean Corpuscular Volume 92.3    fL


 


Mean Corpuscular Hemoglobin 31.9  25-34  pg


 


Mean Corpuscular Hemoglobin


Concent 34.5


  


  32-36  g/dl


 


 


Platelet Count 328  130-400  K/uL


 


Mean Platelet Volume 9.2  7.4-10.4  fL


 


Neutrophils (%) (Auto) 74.2   %


 


Lymphocytes (%) (Auto) 15.0   %


 


Monocytes (%) (Auto) 7.7   %


 


Eosinophils (%) (Auto) 2.5   %


 


Basophils (%) (Auto) 0.3   %


 


Neutrophils # (Auto) 11.38  1.4-6.5  K/uL


 


Lymphocytes # (Auto) 2.30  1.2-3.4  K/uL


 


Monocytes # (Auto) 1.18  0.11-0.59  K/uL


 


Eosinophils # (Auto) 0.38  0-0.5  K/uL


 


Basophils # (Auto) 0.04  0-0.2  K/uL


 


RDW Standard Deviation 45.3  36.4-46.3  fL


 


RDW Coefficient of Variation 13.4  11.5-14.5  %


 


Immature Granulocyte % (Auto) 0.3   %


 


Immature Granulocyte # (Auto) 0.05  0.00-0.02  K/uL


 


Prothrombin Time


  10.7


  


  9.0-12.0


SECONDS


 


Prothromb Time International


Ratio 1.0


  


  0.9-1.1  


 


 


Sodium Level 136  136-145  mmol/L


 


Potassium Level 3.2  3.5-5.1  mmol/L


 


Chloride Level 99    mmol/L


 


Carbon Dioxide Level 29  21-32  mmol/L


 


Anion Gap 8.0  3-11  mmol/L


 


Blood Urea Nitrogen 7  7-18  mg/dl


 


Creatinine


  0.78


  


  0.60-1.20


mg/dl


 


Est Creatinine Clear Calc


Drug Dose 69.2


  


   ml/min


 


 


Estimated GFR (


American) 89.3


  


   


 


 


Estimated GFR (Non-


American 77.0


  


   


 


 


BUN/Creatinine Ratio 9.2  10-20  


 


Random Glucose 121  70-99  mg/dl


 


Calcium Level 9.1  8.5-10.1  mg/dl


 


Total Bilirubin 0.6  0.2-1  mg/dl


 


Aspartate Amino Transf


(AST/SGOT) 13


  


  15-37  U/L


 


 


Alanine Aminotransferase


(ALT/SGPT) 15


  


  12-78  U/L


 


 


Alkaline Phosphatase 102    U/L


 


Total Protein 6.7  6.4-8.2  gm/dl


 


Albumin 3.4  3.4-5.0  gm/dl


 


Globulin 3.3  2.5-4.0  gm/dl


 


Albumin/Globulin Ratio 1.0  0.9-2  


 


Lactic Acid Level  1.1 0.4-2.0  mmol/L








Microbiology Results


9/10/17 Blood Culture, Received


          Pending


9/10/17 Blood Culture, Received


          Pending


9/10/17 MRSA DNA Surveillance Screen, Ordered


          Pending


9/10/17 Gram Stain, Ordered


          Pending


9/10/17 Sputum Culture, Ordered


          Pending





Diagnostic Radiology


CXR 09/10/2017 compared to 05/20/2015   


   Subcutaneous air, left-sided chest tube kinked at the apices, questionable 

intra-thoracic placement, right hilar surgical clips, sternal wires


Chest x-ray post procedure


   Chest tube in an anterior middle position with re-expansion of the left 

hemithorax





Impression


Assessment and Plan


71-year-old female with pulmonary abscess and associated pneumothorax:





1. Pneumothorax:  Patient currently has a 20 Nepali chest tube in place with 

good re-expansion of the lung by chest x-ray.  Will continue to monitor the 

patient for possible bronchopleural fistula.





2.  Pulmonary Abscess:  As the patient has a penicillin allergy the next drugs 

of choice would be carbapenem based such as imipenem are meropenem.  At this 

time I will consult Infectious Disease for further guidance.  Patient is 

currently treated with aztreonam.  As well as consult in idea believe we should 

consult thoracic surgery as there is a high risk that this patient will require 

resection.





3. COPD:  Patient does have a history of COPD currently treated with Advair, 

Singulair and Spiriva.  In the hospital she is currently on methylprednisolone 

40 mg every 8 hours.  I will discontinue her methylprednisolone at this time 

she is not having an active COPD exacerbation and steroids might increase her 

risk of infection as well as create poor wound healing.

## 2017-09-11 NOTE — DIAGNOSTIC IMAGING REPORT
CHEST ONE VIEW PORTABLE



CLINICAL HISTORY: Chest tube placement.    



COMPARISON STUDY:  Earlier in the day



FINDINGS: There are postsurgical changes of a midline sternotomy. The left-sided

pleural drainage catheter remains unchanged in position. There is subcutaneous

emphysema on the left. No significant pneumothorax is visualized. There is

slight improvement in the left basal airspace opacities.[ 



IMPRESSION: 

1. No change in position of the left-sided chest tube. No significant

pneumothorax. Left-sided subcutaneous emphysema

2. Slight improvement in the left basilar airspace opacities







Electronically signed by:  Hebert Wolf M.D.

9/11/2017 1:34 PM



Dictated Date/Time:  9/11/2017 1:33 PM

## 2017-09-11 NOTE — DIAGNOSTIC IMAGING REPORT
CHEST ONE VIEW PORTABLE



CLINICAL HISTORY: 71 years-old Female presenting with chest tube placement . 



TECHNIQUE: Portable upright AP view of the chest was obtained.



COMPARISON:  9/10/2017.



FINDINGS:

Median sternotomy wires intact. Atherosclerosis of the aortic arch. Cardiac

silhouette grossly normal allowing for partial obscuration of the apex. Surgical

clips project over the right mid lung. The previously noted large bore left

pleural drain has been replaced or repositioned, now terminating in the

paramediastinal left apex. Associated extensive soft tissue emphysema along the

left lateral chest wall and left base of the neck. Trace left apical

pneumothorax persists. Extensive left basilar opacity stable to slightly

increased from prior. Right lung and pleural space clear. Osseous structures

normal. Upper abdomen normal.



IMPRESSION:

1.  Interval replacement or repositioning of the large bore left pleural drain,

which now terminates appropriately near the left apex. Trace left pneumothorax

persists.



2.  Stable to slightly increased left basilar opacity concerning for pneumonia.







Electronically signed by:  Silviano Moses M.D.

9/11/2017 6:57 AM



Dictated Date/Time:  9/11/2017 6:54 AM

## 2017-09-11 NOTE — DIAGNOSTIC IMAGING REPORT
CHEST ONE VIEW PORTABLE



CLINICAL HISTORY: PNEUMOTHORAX     



COMPARISON STUDY:  9/11/2017



FINDINGS: There are postsurgical changes of a midline sternotomy. The left-sided

pleural drain remains unchanged in position. There is persistent left-sided

subcutaneous emphysema. No pneumothorax is visualized. Left basal airspace

opacities persist the right lung remains clear[ 



IMPRESSION: 

1. No significant change in the position of the left-sided pleural drain, the

tip of which projects over the aortic arch

2. No pneumothorax identified

3. Persistent left-sided subcutaneous emphysema

4. Persistent left basilar airspace opacities







Electronically signed by:  Hebert Wolf M.D.

9/11/2017 7:13 AM



Dictated Date/Time:  9/11/2017 7:11 AM

## 2017-09-11 NOTE — PROGRESS NOTE
Internal Med Progress Note


Date of Service:


Sep 11, 2017.


Provider Documentation:





SUBJECTIVE:





pt found sitting on chair 


Chest tube repositioned last night 


denies of any SOB or pain 


has mild achy discomfort and pain on back chest tube site 


much improved form the discomfort she had since the initial chest tube 





no fever or chills 


has non productive cough 








OBJECTIVE:





Vital Signs-as noted below





Exam:


General-pleasant , no sign of distress 


Eyes-sclera non icteric 


ENT-NAD 


Neck-no JVD 


Lungs-diminished, chest tube placed on left side 


Heart-regular 


Abdomen-soft, non tender 


Extremities-no lower ext edema 


Neuro-AAO x3, no focal deficit 





Lab data as noted below.


ASSESSMENT & PLAN:


71 year old female with history of CAD/CABG, HTN, COPD presenting with left 

sided chest pain.





LEFT SIDED PNEUMOTHORAX


LEFT LOWER LOBE PNEUMONIA VS. EFFUSION


- chest tube re adjusted last night as initial Chest tube placed in [place] 

ER showed -displaced 


out of lung cavity , significant left sided sub cutaneous emphysema 


pt was experiencing severe chest discomfort 


CT chest shows : 


1. Left-sided chest tube is located entirely within the subcutaneous fat and


soft tissues of the chest wall itself..


2. There is no evidence for extension of any component of tube to the chest


cavity itself.


3. 30% left-sided pneumothorax.


4. Extensive subcutaneous emphysema over the left hemithorax.


5. The chest tube within the soft tissues contains at least 2 kinks


6. This tube should be removed


7. Small left effusion with partial left lower lobe atelectatic change. 








appreciate input form Pulmonology Dr Frazier , chest tube repositioned last 

night 





-repeat Cxray shows -chest tube in adequate position 


pt offers no discomfort , 


concern for loculated infection on left lower lung lobe 


CT surgery consulted 


abx coverage broadened to add Imipenem 


blood culture ordered 


ID eval requested 





COPD


- hx of chronic tobacco use 


with underlying emphysema


used Symbicort and Spiriva -continued  


pt mention of sudden onset of left sided chest chest pain with cough possible 

bullae rupture causing pneumothorax ? 


has chest tube placement now 


pulmonology and CT surgery following 


pt is counselled repeatedly for smoking cessation 





CAD/CABG


- hold ASA, Plavix for chest tube insertion


- continue Losartan, Metoprolol


- hold Lasix for now in setting of pulmonary infection 





HTN


- continue Losartan, Metoprolol


-





DVT PROPHYLAXIS


SCDs for now-chest tube in position 





FULL CODE PER PATIENT





DISPOSITION


anticipate d/c home when medically stable


will need PT/OT eval prior to discharge 


follows with Family practice at [place] area 


would like to follow up with Pulmonology Dr Frazier in [place] 


appropriate referral /follow up will be arranged on discharge


Vital Signs:











  Date Time  Temp Pulse Resp B/P (MAP) Pulse Ox O2 Delivery O2 Flow Rate FiO2


 


9/11/17 12:33 36.7 70 20 124/74 (91) 95 Nasal Cannula 4.0 


 


9/11/17 11:13  70 15  94 Nasal Cannula 4.0 


 


9/11/17 08:00      Nasal Cannula 2.0 


 


9/11/17 07:41 36.6 83 20 131/84 (100) 94 Nasal Cannula 2.5 


 


9/11/17 07:16  83 17  91 Nasal Cannula 2.0 


 


9/11/17 04:15      Nasal Cannula 2.0 


 


9/11/17 04:03 36.6 76 18 135/77 (96) 92 Nasal Cannula 2.5 


 


9/11/17 03:21  76 16  98 Nasal Cannula 2.0 


 


9/11/17 00:15     95 Nasal Cannula 2.0 


 


9/11/17 00:15 37.2 88 20 146/87 (106) 92 Nasal Cannula 4.0 


 


9/10/17 19:32 36.8 69 22 124/77 98 Nasal Cannula 2.0 


 


9/10/17 19:10 37.3 76 21 125/79 (94) 93 Nasal Cannula 3.5 








Lab Results:





Results Past 24 Hours








Test


  9/10/17


19:31 9/10/17


19:37 9/11/17


05:18 Range/Units


 


 


White Blood Count 15.33  14.28 4.8-10.8  K/uL


 


Red Blood Count 4.05  4.18 4.2-5.4  M/uL


 


Hemoglobin 12.9  12.8 12.0-16.0  g/dL


 


Hematocrit 37.4  38.2 37-47  %


 


Mean Corpuscular Volume 92.3  91.4   fL


 


Mean Corpuscular Hemoglobin 31.9  30.6 25-34  pg


 


Mean Corpuscular Hemoglobin


Concent 34.5


  


  33.5


  32-36  g/dl


 


 


Platelet Count 328  323 130-400  K/uL


 


Mean Platelet Volume 9.2  9.2 7.4-10.4  fL


 


Neutrophils (%) (Auto) 74.2    %


 


Lymphocytes (%) (Auto) 15.0    %


 


Monocytes (%) (Auto) 7.7    %


 


Eosinophils (%) (Auto) 2.5    %


 


Basophils (%) (Auto) 0.3    %


 


Neutrophils # (Auto) 11.38   1.4-6.5  K/uL


 


Lymphocytes # (Auto) 2.30   1.2-3.4  K/uL


 


Monocytes # (Auto) 1.18   0.11-0.59  K/uL


 


Eosinophils # (Auto) 0.38   0-0.5  K/uL


 


Basophils # (Auto) 0.04   0-0.2  K/uL


 


RDW Standard Deviation 45.3  44.6 36.4-46.3  fL


 


RDW Coefficient of Variation 13.4  13.4 11.5-14.5  %


 


Immature Granulocyte % (Auto) 0.3    %


 


Immature Granulocyte # (Auto) 0.05   0.00-0.02  K/uL


 


Prothrombin Time


  10.7


  


  


  9.0-12.0


SECONDS


 


Prothromb Time International


Ratio 1.0


  


  


  0.9-1.1  


 


 


Sodium Level 136  134 136-145  mmol/L


 


Potassium Level 3.2  3.6 3.5-5.1  mmol/L


 


Chloride Level 99  98   mmol/L


 


Carbon Dioxide Level 29  29 21-32  mmol/L


 


Anion Gap 8.0  7.0 3-11  mmol/L


 


Blood Urea Nitrogen 7  10 7-18  mg/dl


 


Creatinine


  0.78


  


  0.80


  0.60-1.20


mg/dl


 


Est Creatinine Clear Calc


Drug Dose 69.2


  


  67.4


   ml/min


 


 


Estimated GFR (


American) 89.3


  


  86.6


   


 


 


Estimated GFR (Non-


American 77.0


  


  74.7


   


 


 


BUN/Creatinine Ratio 9.2  12.4 10-20  


 


Random Glucose 121  169 70-99  mg/dl


 


Calcium Level 9.1  9.1 8.5-10.1  mg/dl


 


Total Bilirubin 0.6   0.2-1  mg/dl


 


Aspartate Amino Transf


(AST/SGOT) 13


  


  


  15-37  U/L


 


 


Alanine Aminotransferase


(ALT/SGPT) 15


  


  


  12-78  U/L


 


 


Alkaline Phosphatase 102     U/L


 


Total Protein 6.7   6.4-8.2  gm/dl


 


Albumin 3.4   3.4-5.0  gm/dl


 


Globulin 3.3   2.5-4.0  gm/dl


 


Albumin/Globulin Ratio 1.0   0.9-2  


 


Lactic Acid Level  1.1  0.4-2.0  mmol/L


 


Magnesium Level   1.8 1.8-2.4  mg/dl








Microbiology Results


9/10/17 Blood Culture, Received


          Pending


9/10/17 Blood Culture, Received


          Pending


9/10/17 Gram Stain - Final, Resulted


          


9/10/17 Sputum Culture - Preliminary, Resulted


          NO GROWTH

## 2017-09-11 NOTE — PROCEDURE NOTE
Procedure Note


Date of Service


Sep 11, 2017.





Procedure Note


Procedures:  Chest tube left hemithorax


Consent: obtained via the patient and placed into the chart


Pre-Procedural Dx:  Pneumothorax associated with pulmonary abscess


Post-Procedural Dx:  Pneumothorax associated with pulmonary abscess





Analgesia:


   12cc of 2% Liquid Lidocaine





Procedure:


The patient was initially placed in a right lateral decubitus position and 

chlorhexidine was used for sterilization.  With this the initial chest tube the 

patient had was removed.  The patient was then draped instead prepped in a 

sterile fashion.  The initial incision site from the previous intervention was 

approximately 10centimeters in length.  It was located along the anterior mid 

axillary line approximately the 7th intercostal space.  Due to this I did use 

lidocaine slowly and then dissect through the fat muscular planes down to the 

rib.  I then injected between the 7th and 8th rib and along the pleural 

surface.  Was then able to introduce the Achilles into the thoracic cavity in 

heard a flush of air.  Following this I placed a a 20 Welsh chest tube in 

anterior lateral medial position to approximately 18 cm at the skin.  I then 

used 0 silk along with 1 0 silk and then to ethylene/final sutures.  These were 

placed in a vertical mattress style to close up the entire wound.





EBL: 8 cc





Complications:  Postoperative chest x-ray showed chest tube in anterior medial 

direction with the lung re-expanded.  No complications were noted.

## 2017-09-11 NOTE — PROGRESS NOTE
Progress Note


Date of Service


Sep 11, 2017.





Progress Note


ID Consult  Dictated #[id number]





A/P:





1. L Effusion - ? infectious





-Continue abx, follow culture results


-Will follow, thank you

## 2017-09-11 NOTE — SURGICAL CONSULTATION
DATE OF CONSULTATION:  09/11/2017

 

REASON FOR CONSULTATION:

1.  Left-sided pneumothorax.

2.  Probable lung abscess responsible for left-sided pneumothorax.

 

HISTORY OF PRESENT ILLNESS:  This is a 71-year-old female who is a smoker,

who was found to have an area in the superior aspect of her left lower lobe

but actually involving most of her left lower lobe, which appear to be an

abscess.  Apparently she was treated with antibiotics as an outpatient;

however, she described she was placed on azithromycin and prednisone and she

felt like her symptoms may have improved, but then she had increasing cough

and had left-sided chest pain on the evening of 09/10/2017.  She went to

[facility] and found to have pneumothorax, and a chest tube was

placed.  This was removed and another chest tube placed after she arrived at

[facility].  The lung appears to be fairly well expanded. 

A CT scan upon her arrival shows significant infiltration of the lower lobe 
with what appears to be a pulmonary

abscess.  She also has a history of chronic obstructive pulmonary disease. 

She has been seen by infectious disease and is on antimicrobial therapy. She 
has had no fevers and no

chills, although she did have white blood count of 15,000.  She also had some

left-sided chest and shoulder pain.  I have been asked to comment on her from

a thoracic surgery standpoint and to help manage the chest tube also.

 

PAST MEDICAL HISTORY:

1.  Chronic active cigarette smoking.

2.  "Chronic bronchitis."

3.  Hypertension.

4.  Chronic obstructive pulmonary disease.

5.  Coronary artery disease.

6.  Lumbosacral disc disease.

7.  Gastroesophageal reflux disease.

8.  Hypercholesterolemia.

 

PAST SURGICAL HISTORY:

1.  Laminectomy.

2.  Hysterectomy.

3.  Coronary artery bypass graft.

4.  Resection of atrial myxoma.

5.  Revision of back surgery.

 

SOCIAL HISTORY:  The patient smokes every day.  She does not use alcohol. 

She is retired.  She is .  She lives with family and her .

 

FAMILY MEDICAL HISTORY:  There is a history of coronary artery disease and

diabetes and hypertension as well as chronic obstructive pulmonary disease.

 

MEDICATIONS:

1.  Norvasc.

2.  Cozaar.

3.  Lopressor.

4.  ProAir.

5.  Plavix.

6.  Aspirin 81 mg.

7.  Singulair.

8.  Mag-Ox.

9.  Zantac.

10.  Lipitor.

11.  Alprazolam.

12.  Ipratropium bromide nebulizers q.i.d.

13.  Spiriva 1 puff daily.

14.  Symbicort inhaler b.i.d.

15.  Vitamin D.

16.  Gabapentin.

17.  Oxycodone.

18.  Nexium.

19.  Stool softener.

20.  Soma.

21.  Multivitamin.

 

ALLERGIES:

1.  CEPHALOSPORIN.

2.  PENICILLIN.

3.  SULFA.

4.  VANCOMYCIN.

5.  OXYCODONE.

 

REVIEW OF SYSTEMS:  The patient denies any weight loss.  She states that she

has felt a bit weak.  She does have problems with feeling much weaker and

having more dyspnea over the last 2 weeks.  She denies any visual or auditory

symptoms.  She has had no nausea, vomiting or diarrhea.  She denies abdominal

pain or low back pain.

 

PHYSICAL EXAMINATION:

GENERAL:  This is a short heavy woman who stands 5 feet 3 inches tall and

weighs 186 pounds.  She is awake and alert.  She is wearing 2 liters of O2

with 91% saturation.

HEENT:  Her pupils are equally round and reactive.  Sclerae are pale but

anicteric.  She has no nasolabial flattening.  Tongue is midline and I detect

no oral mucosal lesions or evidence of oral candidiasis.

NECK:  Supple.  She has no supraclavicular or cervical lymphadenopathy, neck

vein distention or thyromegaly.

CHEST:  She is actually moving air fairly well bilaterally without overt

wheezing.  She has some subcutaneous emphysema on the left.  Chest tube site

dressing is dry.

HEART:  She has a regular rate and rhythm of her heart.

ABDOMEN:  A bit obese, but soft and nontender.

EXTREMITIES:  She has no peripheral edema.  She has good peripheral pulses. 

She has no joint effusions.

NEUROLOGIC:  She is awake, alert and oriented.

 

ASSESSMENT AND  PLAN:  Probable lung abscess left lower lobe.  The patient

has an active air leak with her chest tube.  I believe this patient needs to

go to the operating room where if not a segmentectomy or wedge resection,

then possibly a left lower lobectomy be offered.

 

 

 

KELSI

## 2017-09-11 NOTE — PULMONOLOGY PROGRESS NOTE
Pulmonary Progress Note


Date of Service


Sep 11, 2017.





Attending


Dr. Reis





Subjective


Patient seen and examined at bedside. She is  complaining of left sided chest 

pain. 


She denies any dyspnea, but still continues to have dry nonproductive cough.





Objective


VS reviewed


Lungs: coarse crackles b/l bases, left chest tube in place + air leak present


Abd: soft/NT/ND BS+


Ext:no edema b/l, no cyanosis, no clubbing. 





Labs reviewed.





Blood culture 9/10/2017--pending


Sputum 9/10--no growth





Medications reviewed.


Imaging viewed by me.





CXR 9/11/2017 12 pm


IMPRESSION: 


1. No change in position of the left-sided chest tube. No significant


pneumothorax. Left-sided subcutaneous emphysema


2. Slight improvement in the left basilar airspace opacities





CXR 9/11/2017 8 am


IMPRESSION: 


1. No significant change in the position of the left-sided pleural drain, the


tip of which projects over the aortic arch


2. No pneumothorax identified


3. Persistent left-sided subcutaneous emphysema


4. Persistent left basilar airspace opacities








CXR 9/11/2017


IMPRESSION:


1.  Interval replacement or repositioning of the large bore left pleural drain,


which now terminates appropriately near the left apex. Trace left pneumothorax


persists.


2.  Stable to slightly increased left basilar opacity concerning for pneumonia.





CT chest 9/11/2017


IMPRESSION: 


1. Left-sided chest tube is located entirely within the subcutaneous fat and


soft tissues of the chest wall itself..


2. There is no evidence for extension of any component of tube to the chest


cavity itself.


3. 30% left-sided pneumothorax.


4. Extensive subcutaneous emphysema over the left hemithorax.


5. The chest tube within the soft tissues contains at least 2 kinks


6. This tube should be removed


7. Small left effusion with partial left lower lobe atelectatic change.





Assessment & Plan


Left Pulmonary Abscess


Left secondary spontaneous pneumothorax


COPD








Left pneumothorax appears to have resolved. There still appears to be a small 

persistent air leak, suggestive of bronchopleural fistula. I spoke with Tamar Frazier and David this morning. Dr. Macias plans to take patient to the 

OR for possible segmentectomy of this pulmonary abscess on Wednesday. 


Continue with broad spectrum antibiotics. ID has been consulted and is 

following. Blood cultures are still pending.


Continue with Advair, Singulair and Spiriva for COPD maintenance therapy. She 

is not in acute exacerbation.


Continue with supplemental oxygenation and adequate pain control.





Will continue to follow with you.





Data


Medications:





Current Inpatient Medications








 Medications


  (Trade)  Dose


 Ordered  Sig/Hermila


 Route  Start Time


 Stop Time Status Last Admin


Dose Admin


 


 Acetaminophen


  (Tylenol Tab)  650 mg  Q4H  PRN


 PO  9/10/17 19:15


 10/10/17 19:14   


 


 


 Al Hydrox/Mg


 Hydrox/Simethicone


  (Maalox Max Susp)  15 ml  Q4H  PRN


 PO  9/10/17 19:15


 10/10/17 19:14   


 


 


 Magnesium


 Hydroxide


  (Milk Of


 Magnesia Susp)  30 ml  Q12H  PRN


 PO  9/10/17 19:15


 10/10/17 19:14   


 


 


 Zolpidem Tartrate


  (Ambien Tab)  5 mg  HSZ  PRN


 PO  9/10/17 19:15


 10/10/17 19:14   


 


 


 Ondansetron HCl


  (Zofran Inj)  4 mg  Q6H  PRN


 IV  9/10/17 19:15


 10/10/17 19:14   


 


 


 Nitroglycerin


  (Nitrostat Tab)  0.4 mg  UD  PRN


 SL  9/10/17 19:15


 10/10/17 19:14   


 


 


 Polyethylene


  (Miralax Powder


 Packet)  17 gm  DAILY  PRN


 PO  9/10/17 19:15


 10/10/17 19:14   


 


 


 Albuterol


  (Ventolin Hfa


 Inhaler)  2 puffs  Q4  PRN


 INH  9/10/17 19:15


 10/10/17 19:14   


 


 


 Alprazolam


  (Xanax Tab)  0.5 mg  HS  PRN


 PO  9/10/17 19:15


 10/10/17 19:14   


 


 


 Atorvastatin


 Calcium


  (Lipitor Tab)  10 mg  HS


 PO  9/10/17 21:00


 10/10/17 20:59  9/11/17 00:36


10 MG


 


 Bupropion HCl


  (Wellbutrin-Sr


 Tab)  100 mg  BID


 PO  9/10/17 21:00


 10/10/17 20:59   


 


 


 Carisoprodol


  (Soma Tab)  350 mg  TID  PRN


 PO  9/10/17 19:15


 10/10/17 19:14   


 


 


 Docusate Sodium


  (coLACE CAP)  200 mg  DAILY


 PO  9/11/17 09:00


 10/11/17 08:59  9/11/17 08:26


200 MG


 


 Salmeterol


 Xinafoate/


 Fluticasone


  (Advair Diskus


 250/50 Inh)  1 puff  BID


 INH  9/10/17 21:00


 10/10/17 20:59  9/10/17 22:18


1 PUFF


 


 Gabapentin


  (Neurontin Cap)  300 mg  TID


 PO  9/10/17 21:00


 10/10/17 20:59  9/11/17 08:24


300 MG


 


 Acetaminophen/


 Hydrocodone Bitart


  (Norco 7.5/325


 Tab)  1 tab  Q6  PRN


 PO  9/10/17 19:15


 9/24/17 19:14   


 


 


 Lorazepam


  (Ativan Tab)  1 mg  Q8H  PRN


 PO  9/10/17 19:15


 10/10/17 19:14  9/10/17 19:54


1 MG


 


 Losartan Potassium


  (coZAAR TAB)  100 mg  QAM


 PO  9/11/17 09:00


 10/11/17 08:59  9/11/17 08:27


100 MG


 


 Magnesium Oxide


  (Mag-Ox Tab)  400 mg  BID


 PO  9/10/17 21:00


 10/10/17 20:59  9/11/17 08:24


400 MG


 


 Montelukast Sodium


  (Singulair Tab)  10 mg  HS


 PO  9/10/17 21:00


 10/10/17 20:59  9/11/17 00:35


10 MG


 


 Multivitamins


  (Multivitamin


 Tab)  1 tab  QAM


 PO  9/11/17 09:00


 10/11/17 08:59  9/11/17 08:25


1 TAB


 


 Oxycodone HCl


  (Roxicodone


 Immediate Rel Tab)  5 mg  Q4H  PRN


 PO  9/10/17 19:15


 9/24/17 19:14  9/11/17 08:21


5 MG


 


 Ranitidine HCl


  (zANTac TAB)  150 mg  HS


 PO  9/10/17 21:00


 10/10/17 20:59  9/11/17 00:34


150 MG


 


 Tramadol HCl


  (Ultram Tab)  50 mg  TID  PRN


 PO  9/10/17 19:15


 10/10/17 19:14   


 


 


 Calcium/Vitamin D


  (Caltrate Plus


 Tab)  1 tab  QAM


 PO  9/11/17 09:00


 10/11/17 08:59  9/11/17 08:25


1 TAB


 


 Pantoprazole


 Sodium


  (Protonix Tab)  40 mg  DAILY


 PO  9/11/17 09:00


 10/11/17 08:59  9/11/17 08:25


40 MG


 


 Morphine Sulfate


  (MoRPHine


 SULFATE INJ)  4 mg  Q4H  PRN


 IV  9/11/17 00:00


 9/24/17 19:59   


 


 


 Hydromorphone HCl


  (Dilaudid Inj)  0.5 mg  Q4H  PRN


 IV  9/10/17 21:00


 9/24/17 20:59   


 


 


 Nicotine


  (Nicoderm Cq


 14MG Patch)  1 patch  QAM


 TD  9/11/17 09:00


 10/11/17 08:59  9/11/17 08:29


1 PATCH


 


 Miscellaneous


  (Remove Nicoderm


 Patch)  1 ea  HS


 N/A  9/11/17 21:00


 10/11/17 20:59   


 


 


 Ipratropium


 Bromide


  (Atrovent 0.02%


 0.5MG/2.5ML Neb)  0.5 mg  Q4R


 INH  9/11/17 00:00


 10/11/17 00:00  9/11/17 11:10


0.5 MG


 


 Levalbuterol


  (Xopenex 1.25MG/


 0.5ML Neb)  1.25 mg  Q4R


 INH  9/11/17 00:00


 10/11/17 00:00  9/11/17 11:10


1.25 MG


 


 Tiotropium Bromide


  (Spiriva


 Handihaler


 Inhaler)  1 puff  QAM


 INH  9/11/17 09:00


 10/11/17 08:59  9/11/17 08:28


1 PUFF


 


 Ketorolac


 Tromethamine


  (Toradol Inj)  15 mg  Q6H


 IV.  9/11/17 06:00


 9/13/17 05:59  9/11/17 12:18


15 MG


 


 Imipenem/


 Cilastatin Sodium


  (Consult)  1 ea  DAILY  PRN


 N/A  9/11/17 06:24


 10/11/17 06:23   


 


 


 Metoprolol


 Tartrate


  (Lopressor Tab)  50 mg  BID@0630,1600


 PO  9/11/17 16:00


 10/11/17 15:59   


 


 


 Budesonide/


 Formoterol


 Fumarate


  (Symbicort 80/


 4.5 Inh)  2 puffs  BID


 INH  9/11/17 11:30


 10/11/17 11:29   


 


 


 Imipenem/


 Cilastatin Sodium


 500 mg/Dextrose  110 ml @ 


 110 mls/hr  Q6@0400,1000,1600,2200


 IV  9/11/17 16:00


 9/18/17 15:59   


 








Vital Signs:











  Date Time  Temp Pulse Resp B/P (MAP) Pulse Ox O2 Delivery O2 Flow Rate FiO2


 


9/11/17 12:33 36.7 70 20 124/74 (91) 95 Nasal Cannula 4.0 


 


9/11/17 12:00      Nasal Cannula 2.0 


 


9/11/17 11:13  70 15  94 Nasal Cannula 4.0 


 


9/11/17 08:00      Nasal Cannula 2.0 


 


9/11/17 07:41 36.6 83 20 131/84 (100) 94 Nasal Cannula 2.5 


 


9/11/17 07:16  83 17  91 Nasal Cannula 2.0 


 


9/11/17 04:15      Nasal Cannula 2.0 


 


9/11/17 04:03 36.6 76 18 135/77 (96) 92 Nasal Cannula 2.5 


 


9/11/17 03:21  76 16  98 Nasal Cannula 2.0 


 


9/11/17 00:15     95 Nasal Cannula 2.0 


 


9/11/17 00:15 37.2 88 20 146/87 (106) 92 Nasal Cannula 4.0 


 


9/10/17 19:32 36.8 69 22 124/77 98 Nasal Cannula 2.0 


 


9/10/17 19:10 37.3 76 21 125/79 (94) 93 Nasal Cannula 3.5 








Laboratory Results:





Last 24 Hours








Test


  9/10/17


19:31 9/10/17


19:37 9/11/17


05:18


 


White Blood Count 15.33 K/uL   14.28 K/uL 


 


Red Blood Count 4.05 M/uL   4.18 M/uL 


 


Hemoglobin 12.9 g/dL   12.8 g/dL 


 


Hematocrit 37.4 %   38.2 % 


 


Mean Corpuscular Volume 92.3 fL   91.4 fL 


 


Mean Corpuscular Hemoglobin 31.9 pg   30.6 pg 


 


Mean Corpuscular Hemoglobin


Concent 34.5 g/dl 


  


  33.5 g/dl 


 


 


Platelet Count 328 K/uL   323 K/uL 


 


Mean Platelet Volume 9.2 fL   9.2 fL 


 


Neutrophils (%) (Auto) 74.2 %   


 


Lymphocytes (%) (Auto) 15.0 %   


 


Monocytes (%) (Auto) 7.7 %   


 


Eosinophils (%) (Auto) 2.5 %   


 


Basophils (%) (Auto) 0.3 %   


 


Neutrophils # (Auto) 11.38 K/uL   


 


Lymphocytes # (Auto) 2.30 K/uL   


 


Monocytes # (Auto) 1.18 K/uL   


 


Eosinophils # (Auto) 0.38 K/uL   


 


Basophils # (Auto) 0.04 K/uL   


 


RDW Standard Deviation 45.3 fL   44.6 fL 


 


RDW Coefficient of Variation 13.4 %   13.4 % 


 


Immature Granulocyte % (Auto) 0.3 %   


 


Immature Granulocyte # (Auto) 0.05 K/uL   


 


Prothrombin Time 10.7 SECONDS   


 


Prothromb Time International


Ratio 1.0 


  


  


 


 


Sodium Level 136 mmol/L   134 mmol/L 


 


Potassium Level 3.2 mmol/L   3.6 mmol/L 


 


Chloride Level 99 mmol/L   98 mmol/L 


 


Carbon Dioxide Level 29 mmol/L   29 mmol/L 


 


Anion Gap 8.0 mmol/L   7.0 mmol/L 


 


Blood Urea Nitrogen 7 mg/dl   10 mg/dl 


 


Creatinine 0.78 mg/dl   0.80 mg/dl 


 


Est Creatinine Clear Calc


Drug Dose 69.2 ml/min 


  


  67.4 ml/min 


 


 


Estimated GFR (


American) 89.3 


  


  86.6 


 


 


Estimated GFR (Non-


American 77.0 


  


  74.7 


 


 


BUN/Creatinine Ratio 9.2   12.4 


 


Random Glucose 121 mg/dl   169 mg/dl 


 


Calcium Level 9.1 mg/dl   9.1 mg/dl 


 


Total Bilirubin 0.6 mg/dl   


 


Aspartate Amino Transf


(AST/SGOT) 13 U/L 


  


  


 


 


Alanine Aminotransferase


(ALT/SGPT) 15 U/L 


  


  


 


 


Alkaline Phosphatase 102 U/L   


 


Total Protein 6.7 gm/dl   


 


Albumin 3.4 gm/dl   


 


Globulin 3.3 gm/dl   


 


Albumin/Globulin Ratio 1.0   


 


Lactic Acid Level  1.1 mmol/L  


 


Magnesium Level   1.8 mg/dl

## 2017-09-12 VITALS — OXYGEN SATURATION: 93 %

## 2017-09-12 VITALS
TEMPERATURE: 97.16 F | DIASTOLIC BLOOD PRESSURE: 79 MMHG | OXYGEN SATURATION: 95 % | HEART RATE: 85 BPM | SYSTOLIC BLOOD PRESSURE: 149 MMHG

## 2017-09-12 VITALS
HEART RATE: 80 BPM | OXYGEN SATURATION: 92 % | DIASTOLIC BLOOD PRESSURE: 79 MMHG | TEMPERATURE: 97.16 F | SYSTOLIC BLOOD PRESSURE: 149 MMHG

## 2017-09-12 VITALS — OXYGEN SATURATION: 96 %

## 2017-09-12 VITALS — OXYGEN SATURATION: 94 % | HEART RATE: 97 BPM

## 2017-09-12 VITALS
DIASTOLIC BLOOD PRESSURE: 63 MMHG | OXYGEN SATURATION: 94 % | TEMPERATURE: 98.24 F | SYSTOLIC BLOOD PRESSURE: 110 MMHG | HEART RATE: 73 BPM

## 2017-09-12 VITALS — OXYGEN SATURATION: 92 %

## 2017-09-12 VITALS
DIASTOLIC BLOOD PRESSURE: 54 MMHG | TEMPERATURE: 99.14 F | HEART RATE: 91 BPM | OXYGEN SATURATION: 93 % | SYSTOLIC BLOOD PRESSURE: 131 MMHG

## 2017-09-12 VITALS — OXYGEN SATURATION: 98 % | HEART RATE: 59 BPM

## 2017-09-12 VITALS
HEART RATE: 71 BPM | SYSTOLIC BLOOD PRESSURE: 147 MMHG | DIASTOLIC BLOOD PRESSURE: 84 MMHG | OXYGEN SATURATION: 92 % | TEMPERATURE: 97.52 F

## 2017-09-12 VITALS — OXYGEN SATURATION: 91 % | HEART RATE: 94 BPM

## 2017-09-12 VITALS — OXYGEN SATURATION: 94 % | HEART RATE: 89 BPM

## 2017-09-12 VITALS — OXYGEN SATURATION: 95 %

## 2017-09-12 VITALS — OXYGEN SATURATION: 97 %

## 2017-09-12 LAB
ANION GAP SERPL CALC-SCNC: 5 MMOL/L (ref 3–11)
ANION GAP SERPL CALC-SCNC: 7 MMOL/L (ref 3–11)
BUN SERPL-MCNC: 14 MG/DL (ref 7–18)
BUN SERPL-MCNC: 15 MG/DL (ref 7–18)
BUN/CREAT SERPL: 18.7 (ref 10–20)
BUN/CREAT SERPL: 19.9 (ref 10–20)
CALCIUM SERPL-MCNC: 8.3 MG/DL (ref 8.5–10.1)
CALCIUM SERPL-MCNC: 8.6 MG/DL (ref 8.5–10.1)
CHLORIDE SERPL-SCNC: 93 MMOL/L (ref 98–107)
CHLORIDE SERPL-SCNC: 93 MMOL/L (ref 98–107)
CO2 SERPL-SCNC: 27 MMOL/L (ref 21–32)
CO2 SERPL-SCNC: 28 MMOL/L (ref 21–32)
CREAT CL PREDICTED SERPL C-G-VRATE: 70 ML/MIN
CREAT CL PREDICTED SERPL C-G-VRATE: 70.9 ML/MIN
CREAT SERPL-MCNC: 0.76 MG/DL (ref 0.6–1.2)
CREAT SERPL-MCNC: 0.77 MG/DL (ref 0.6–1.2)
EOSINOPHIL NFR BLD AUTO: 270 K/UL (ref 130–400)
GLUCOSE SERPL-MCNC: 106 MG/DL (ref 70–99)
GLUCOSE SERPL-MCNC: 139 MG/DL (ref 70–99)
HCT VFR BLD CALC: 30.4 % (ref 37–47)
MAGNESIUM SERPL-MCNC: 1.8 MG/DL (ref 1.8–2.4)
MCH RBC QN AUTO: 32.6 PG (ref 25–34)
MCHC RBC AUTO-ENTMCNC: 36.5 G/DL (ref 32–36)
MCV RBC AUTO: 89.1 FL (ref 80–100)
PMV BLD AUTO: 9 FL (ref 7.4–10.4)
POTASSIUM SERPL-SCNC: 3 MMOL/L (ref 3.5–5.1)
POTASSIUM SERPL-SCNC: 3.5 MMOL/L (ref 3.5–5.1)
RBC # BLD AUTO: 3.41 M/UL (ref 4.2–5.4)
SODIUM SERPL-SCNC: 126 MMOL/L (ref 136–145)
SODIUM SERPL-SCNC: 127 MMOL/L (ref 136–145)
WBC # BLD AUTO: 16.19 K/UL (ref 4.8–10.8)

## 2017-09-12 RX ADMIN — PANTOPRAZOLE SCH MG: 40 TABLET, DELAYED RELEASE ORAL at 07:53

## 2017-09-12 RX ADMIN — LEVALBUTEROL SCH MG: 1.25 SOLUTION, CONCENTRATE RESPIRATORY (INHALATION) at 15:19

## 2017-09-12 RX ADMIN — FLUTICASONE PROPIONATE AND SALMETEROL SCH PUFF: 50; 250 POWDER RESPIRATORY (INHALATION) at 07:42

## 2017-09-12 RX ADMIN — METOPROLOL TARTRATE SCH MG: 50 TABLET, FILM COATED ORAL at 06:00

## 2017-09-12 RX ADMIN — LEVALBUTEROL SCH MG: 1.25 SOLUTION, CONCENTRATE RESPIRATORY (INHALATION) at 11:12

## 2017-09-12 RX ADMIN — LOSARTAN POTASSIUM SCH MG: 50 TABLET, FILM COATED ORAL at 07:51

## 2017-09-12 RX ADMIN — HYDROMORPHONE HYDROCHLORIDE PRN MG: 1 INJECTION, SOLUTION INTRAMUSCULAR; INTRAVENOUS; SUBCUTANEOUS at 14:55

## 2017-09-12 RX ADMIN — RANITIDINE SCH MG: 150 TABLET ORAL at 22:18

## 2017-09-12 RX ADMIN — BUDESONIDE AND FORMOTEROL FUMARATE DIHYDRATE SCH PUFFS: 160; 4.5 AEROSOL RESPIRATORY (INHALATION) at 22:19

## 2017-09-12 RX ADMIN — IMIPENEM AND CILASTATIN SODIUM SCH MLS/HR: 500; 500 INJECTION, POWDER, FOR SOLUTION INTRAVENOUS at 22:43

## 2017-09-12 RX ADMIN — KETOROLAC TROMETHAMINE SCH MG: 15 INJECTION INTRAMUSCULAR; INTRAVENOUS at 00:28

## 2017-09-12 RX ADMIN — KETOROLAC TROMETHAMINE SCH MG: 15 INJECTION INTRAMUSCULAR; INTRAVENOUS at 13:53

## 2017-09-12 RX ADMIN — KETOROLAC TROMETHAMINE SCH MG: 15 INJECTION INTRAMUSCULAR; INTRAVENOUS at 06:00

## 2017-09-12 RX ADMIN — MORPHINE SULFATE PRN MG: 4 INJECTION, SOLUTION INTRAMUSCULAR; INTRAVENOUS at 13:40

## 2017-09-12 RX ADMIN — DOCUSATE SODIUM SCH MG: 100 CAPSULE, LIQUID FILLED ORAL at 07:49

## 2017-09-12 RX ADMIN — NICOTINE SCH PATCH: 7 PATCH, EXTENDED RELEASE TRANSDERMAL at 07:53

## 2017-09-12 RX ADMIN — LEVALBUTEROL SCH MG: 1.25 SOLUTION, CONCENTRATE RESPIRATORY (INHALATION) at 19:45

## 2017-09-12 RX ADMIN — FLUTICASONE PROPIONATE AND SALMETEROL SCH PUFF: 50; 250 POWDER RESPIRATORY (INHALATION) at 21:00

## 2017-09-12 RX ADMIN — IPRATROPIUM BROMIDE SCH MG: 0.5 SOLUTION RESPIRATORY (INHALATION) at 23:32

## 2017-09-12 RX ADMIN — OXYCODONE HYDROCHLORIDE PRN MG: 5 TABLET ORAL at 07:58

## 2017-09-12 RX ADMIN — GABAPENTIN SCH MG: 300 CAPSULE ORAL at 07:52

## 2017-09-12 RX ADMIN — Medication SCH MG: at 07:51

## 2017-09-12 RX ADMIN — Medication SCH TAB: at 07:49

## 2017-09-12 RX ADMIN — GABAPENTIN SCH MG: 300 CAPSULE ORAL at 15:50

## 2017-09-12 RX ADMIN — LEVALBUTEROL SCH MG: 1.25 SOLUTION, CONCENTRATE RESPIRATORY (INHALATION) at 23:32

## 2017-09-12 RX ADMIN — Medication SCH MG: at 22:19

## 2017-09-12 RX ADMIN — IPRATROPIUM BROMIDE SCH MG: 0.5 SOLUTION RESPIRATORY (INHALATION) at 15:20

## 2017-09-12 RX ADMIN — MONTELUKAST SODIUM SCH MG: 10 TABLET, FILM COATED ORAL at 22:18

## 2017-09-12 RX ADMIN — TIOTROPIUM BROMIDE SCH PUFF: 18 CAPSULE ORAL; RESPIRATORY (INHALATION) at 07:48

## 2017-09-12 RX ADMIN — METOPROLOL TARTRATE SCH MG: 50 TABLET, FILM COATED ORAL at 15:52

## 2017-09-12 RX ADMIN — IPRATROPIUM BROMIDE SCH MG: 0.5 SOLUTION RESPIRATORY (INHALATION) at 11:12

## 2017-09-12 RX ADMIN — BUPROPION HYDROCHLORIDE SCH MG: 100 TABLET, FILM COATED, EXTENDED RELEASE ORAL at 07:53

## 2017-09-12 RX ADMIN — IPRATROPIUM BROMIDE SCH MG: 0.5 SOLUTION RESPIRATORY (INHALATION) at 19:45

## 2017-09-12 RX ADMIN — IMIPENEM AND CILASTATIN SODIUM SCH MLS/HR: 500; 500 INJECTION, POWDER, FOR SOLUTION INTRAVENOUS at 16:02

## 2017-09-12 RX ADMIN — IMIPENEM AND CILASTATIN SODIUM SCH MLS/HR: 500; 500 INJECTION, POWDER, FOR SOLUTION INTRAVENOUS at 10:17

## 2017-09-12 RX ADMIN — OXYCODONE HYDROCHLORIDE PRN MG: 5 TABLET ORAL at 22:20

## 2017-09-12 RX ADMIN — GABAPENTIN SCH MG: 300 CAPSULE ORAL at 22:18

## 2017-09-12 RX ADMIN — IPRATROPIUM BROMIDE SCH MG: 0.5 SOLUTION RESPIRATORY (INHALATION) at 07:51

## 2017-09-12 RX ADMIN — KETOROLAC TROMETHAMINE SCH MG: 15 INJECTION INTRAMUSCULAR; INTRAVENOUS at 23:33

## 2017-09-12 RX ADMIN — KETOROLAC TROMETHAMINE SCH MG: 15 INJECTION INTRAMUSCULAR; INTRAVENOUS at 17:52

## 2017-09-12 RX ADMIN — LEVALBUTEROL SCH MG: 1.25 SOLUTION, CONCENTRATE RESPIRATORY (INHALATION) at 03:30

## 2017-09-12 RX ADMIN — LEVALBUTEROL SCH MG: 1.25 SOLUTION, CONCENTRATE RESPIRATORY (INHALATION) at 07:51

## 2017-09-12 RX ADMIN — BUDESONIDE AND FORMOTEROL FUMARATE DIHYDRATE SCH PUFFS: 80; 4.5 AEROSOL RESPIRATORY (INHALATION) at 07:48

## 2017-09-12 RX ADMIN — IPRATROPIUM BROMIDE SCH MG: 0.5 SOLUTION RESPIRATORY (INHALATION) at 03:30

## 2017-09-12 RX ADMIN — OXYCODONE HYDROCHLORIDE PRN MG: 5 TABLET ORAL at 17:53

## 2017-09-12 RX ADMIN — ATORVASTATIN CALCIUM SCH MG: 10 TABLET, FILM COATED ORAL at 22:18

## 2017-09-12 RX ADMIN — Medication SCH TAB: at 07:52

## 2017-09-12 RX ADMIN — IMIPENEM AND CILASTATIN SODIUM SCH MLS/HR: 500; 500 INJECTION, POWDER, FOR SOLUTION INTRAVENOUS at 04:35

## 2017-09-12 NOTE — PULMONOLOGY PROGRESS NOTE
Pulmonary Progress Note


Date of Service


Sep 12, 2017.





Attending


Dr. Reis





Subjective


Patient seen and examined. She states that she is feeling a better. She 

complains of feeling "drowsy."


She is still having some pain in the chest tube insertion site, but is relieved 

with pain medications.


She is ambulating with dyspnea. She does have dry intermittent cough.





Objective


VS reviewed, Tmx 36.8, //84, P 59-94, RR 15-18, SaO2 91-98% on 2-

3L. She is currently on RA


Gen: AAOx3, NAD, speaking in full sentences, no respiratory distress


Lungs: good air entry bilaterally, left chest tube in place + air leak present


Abd: soft/NT/ND BS+


Ext:no edema b/l, no cyanosis, no clubbing. 





Labs reviewed.


WBC 14-->16 today.


Na 126,





Blood culture 9/10/2017--pending


Sputum 9/10--no growth





Medications reviewed.


Imaging viewed by me.





EKG 9/12/2017


Sinus bradycardia with 1st degree AV block.





CXR 9/11/2017 12 pm


IMPRESSION: 


1. No change in position of the left-sided chest tube. No significant


pneumothorax. Left-sided subcutaneous emphysema


2. Slight improvement in the left basilar airspace opacities





CXR 9/11/2017 8 am


IMPRESSION: 


1. No significant change in the position of the left-sided pleural drain, the


tip of which projects over the aortic arch


2. No pneumothorax identified


3. Persistent left-sided subcutaneous emphysema


4. Persistent left basilar airspace opacities








CXR 9/11/2017


IMPRESSION:


1.  Interval replacement or repositioning of the large bore left pleural drain,


which now terminates appropriately near the left apex. Trace left pneumothorax


persists.


2.  Stable to slightly increased left basilar opacity concerning for pneumonia.





CT chest 9/11/2017


IMPRESSION: 


1. Left-sided chest tube is located entirely within the subcutaneous fat and


soft tissues of the chest wall itself..


2. There is no evidence for extension of any component of tube to the chest


cavity itself.


3. 30% left-sided pneumothorax.


4. Extensive subcutaneous emphysema over the left hemithorax.


5. The chest tube within the soft tissues contains at least 2 kinks


6. This tube should be removed


7. Small left effusion with partial left lower lobe atelectatic change.





Assessment & Plan


Left lower lobe cavitary mass


Left secondary spontaneous pneumothorax


COPD


Tobacco use disorder








Left pneumothorax appears to have resolved. There still appears to be a small 

persistent air leak, suggestive of bronchopleural fistula. I spoke with Tamar Frazier and David. Dr. Macias plans to take patient to the OR for 

possible segmentectomy of this pulmonary abscess on Wednesday. Patient has 

hyponatremia which in this setting most likely SIADH. This is also concerning 

for possible malignancy. 





Send urine Na, Urine Osm, Serum Osm. -- I will leave management per primary 

team.


Continue with broad spectrum antibiotics. ID has been consulted and is 

following. Blood cultures are still pending.


Continue with Advair, Singulair and Spiriva for COPD maintenance therapy. She 

is not in acute exacerbation.


Continue with supplemental oxygenation and adequate pain control.


Continue with Nicotine patch. Smoking cessation counseling given.





Will continue to follow with you.





Data


Medications:





Current Inpatient Medications








 Medications


  (Trade)  Dose


 Ordered  Sig/Hermila


 Route  Start Time


 Stop Time Status Last Admin


Dose Admin


 


 Acetaminophen


  (Tylenol Tab)  650 mg  Q4H  PRN


 PO  9/10/17 19:15


 10/10/17 19:14   


 


 


 Al Hydrox/Mg


 Hydrox/Simethicone


  (Maalox Max Susp)  15 ml  Q4H  PRN


 PO  9/10/17 19:15


 10/10/17 19:14   


 


 


 Magnesium


 Hydroxide


  (Milk Of


 Magnesia Susp)  30 ml  Q12H  PRN


 PO  9/10/17 19:15


 10/10/17 19:14   


 


 


 Zolpidem Tartrate


  (Ambien Tab)  5 mg  HSZ  PRN


 PO  9/10/17 19:15


 10/10/17 19:14   


 


 


 Ondansetron HCl


  (Zofran Inj)  4 mg  Q6H  PRN


 IV  9/10/17 19:15


 10/10/17 19:14   


 


 


 Nitroglycerin


  (Nitrostat Tab)  0.4 mg  UD  PRN


 SL  9/10/17 19:15


 10/10/17 19:14   


 


 


 Polyethylene


  (Miralax Powder


 Packet)  17 gm  DAILY  PRN


 PO  9/10/17 19:15


 10/10/17 19:14   


 


 


 Albuterol


  (Ventolin Hfa


 Inhaler)  2 puffs  Q4  PRN


 INH  9/10/17 19:15


 10/10/17 19:14   


 


 


 Alprazolam


  (Xanax Tab)  0.5 mg  HS  PRN


 PO  9/10/17 19:15


 10/10/17 19:14   


 


 


 Atorvastatin


 Calcium


  (Lipitor Tab)  10 mg  HS


 PO  9/10/17 21:00


 10/10/17 20:59  9/11/17 21:23


10 MG


 


 Carisoprodol


  (Soma Tab)  350 mg  TID  PRN


 PO  9/10/17 19:15


 10/10/17 19:14   


 


 


 Docusate Sodium


  (coLACE CAP)  200 mg  DAILY


 PO  9/11/17 09:00


 10/11/17 08:59  9/12/17 07:49


200 MG


 


 Salmeterol


 Xinafoate/


 Fluticasone


  (Advair Diskus


 250/50 Inh)  1 puff  BID


 INH  9/10/17 21:00


 10/10/17 20:59  9/10/17 22:18


1 PUFF


 


 Gabapentin


  (Neurontin Cap)  300 mg  TID


 PO  9/10/17 21:00


 10/10/17 20:59  9/12/17 07:52


300 MG


 


 Acetaminophen/


 Hydrocodone Bitart


  (Norco 7.5/325


 Tab)  1 tab  Q6  PRN


 PO  9/10/17 19:15


 9/24/17 19:14   


 


 


 Lorazepam


  (Ativan Tab)  1 mg  Q8H  PRN


 PO  9/10/17 19:15


 10/10/17 19:14  9/11/17 21:40


1 MG


 


 Losartan Potassium


  (coZAAR TAB)  100 mg  QAM


 PO  9/11/17 09:00


 10/11/17 08:59  9/12/17 07:51


100 MG


 


 Magnesium Oxide


  (Mag-Ox Tab)  400 mg  BID


 PO  9/10/17 21:00


 10/10/17 20:59  9/12/17 07:51


400 MG


 


 Montelukast Sodium


  (Singulair Tab)  10 mg  HS


 PO  9/10/17 21:00


 10/10/17 20:59  9/11/17 21:23


10 MG


 


 Multivitamins


  (Multivitamin


 Tab)  1 tab  QAM


 PO  9/11/17 09:00


 10/11/17 08:59  9/12/17 07:52


1 TAB


 


 Oxycodone HCl


  (Roxicodone


 Immediate Rel Tab)  5 mg  Q4H  PRN


 PO  9/10/17 19:15


 9/24/17 19:14  9/12/17 07:58


5 MG


 


 Ranitidine HCl


  (zANTac TAB)  150 mg  HS


 PO  9/10/17 21:00


 10/10/17 20:59  9/11/17 21:23


150 MG


 


 Tramadol HCl


  (Ultram Tab)  50 mg  TID  PRN


 PO  9/10/17 19:15


 10/10/17 19:14   


 


 


 Calcium/Vitamin D


  (Caltrate Plus


 Tab)  1 tab  QAM


 PO  9/11/17 09:00


 10/11/17 08:59  9/12/17 07:49


1 TAB


 


 Pantoprazole


 Sodium


  (Protonix Tab)  40 mg  DAILY


 PO  9/11/17 09:00


 10/11/17 08:59  9/12/17 07:53


40 MG


 


 Morphine Sulfate


  (MoRPHine


 SULFATE INJ)  4 mg  Q4H  PRN


 IV  9/11/17 00:00


 9/24/17 19:59  9/11/17 21:39


4 MG


 


 Hydromorphone HCl


  (Dilaudid Inj)  0.5 mg  Q4H  PRN


 IV  9/10/17 21:00


 9/24/17 20:59   


 


 


 Nicotine


  (Nicoderm Cq


 14MG Patch)  1 patch  QAM


 TD  9/11/17 09:00


 10/11/17 08:59  9/12/17 07:53


1 PATCH


 


 Miscellaneous


  (Remove Nicoderm


 Patch)  1 ea  HS


 N/A  9/11/17 21:00


 10/11/17 20:59  9/11/17 21:00


1 EA


 


 Ipratropium


 Bromide


  (Atrovent 0.02%


 0.5MG/2.5ML Neb)  0.5 mg  Q4R


 INH  9/11/17 00:00


 10/11/17 00:00  9/12/17 11:12


0.5 MG


 


 Levalbuterol


  (Xopenex 1.25MG/


 0.5ML Neb)  1.25 mg  Q4R


 INH  9/11/17 00:00


 10/11/17 00:00  9/12/17 11:12


1.25 MG


 


 Tiotropium Bromide


  (Spiriva


 Handihaler


 Inhaler)  1 puff  QAM


 INH  9/11/17 09:00


 10/11/17 08:59  9/11/17 08:28


1 PUFF


 


 Ketorolac


 Tromethamine


  (Toradol Inj)  15 mg  Q6H


 IV.  9/11/17 06:00


 9/13/17 05:59  9/12/17 06:00


15 MG


 


 Imipenem/


 Cilastatin Sodium


  (Consult)  1 ea  DAILY  PRN


 N/A  9/11/17 06:24


 10/11/17 06:23   


 


 


 Metoprolol


 Tartrate


  (Lopressor Tab)  50 mg  BID@0630,1600


 PO  9/11/17 16:00


 10/11/17 15:59  9/12/17 06:00


50 MG


 


 Imipenem/


 Cilastatin Sodium


 500 mg/Dextrose  110 ml @ 


 110 mls/hr  Q6@0400,1000,1600,2200


 IV  9/11/17 16:00


 9/18/17 15:59  9/12/17 10:17


110 MLS/HR


 


 Budesonide/


 Formoterol


 Fumarate


  (Symbicort 160/


 4.5 Inh)  2 puffs  BID


 INH  9/12/17 21:00


 10/12/17 20:59   


 


 


 Potassium Chloride


  (Klor-Con M10)  40 meq  ONE  ONCE


 PO  9/12/17 13:00


 9/12/17 13:01   


 


 


 Sodium Chloride  250 ml @ 


 50 mls/hr  Q5H


 IV  9/12/17 13:00


 9/12/17 17:59   


 








Vital Signs:











  Date Time  Temp Pulse Resp B/P (MAP) Pulse Ox O2 Delivery O2 Flow Rate FiO2


 


9/12/17 12:00     95 Nasal Cannula 3.0 


 


9/12/17 11:14  94 15  91 Room Air  


 


9/12/17 08:22 36.4 71 18 147/84 (105) 92 Room Air  


 


9/12/17 08:21     92 Room Air  


 


9/12/17 08:00     97 Nasal Cannula 3.0 


 


9/12/17 07:51  59 18  98 Nasal Cannula 3.0 


 


9/12/17 05:30 36.8 73 18 110/63 (79) 94 Nasal Cannula 2.0 


 


9/12/17 04:10     96 Nasal Cannula 2.0 


 


9/12/17 00:05     96 Nasal Cannula 2.0 


 


9/11/17 23:45 36.8 74 20 154/76 (102) 96 Nasal Cannula  


 


9/11/17 23:07  70 18  97 Nasal Cannula 4.0 


 


9/11/17 20:21  73 18  95 Nasal Cannula 4.0 


 


9/11/17 20:15     97 Nasal Cannula 2.0 


 


9/11/17 19:45 36.9 81 20 168/70 (102) 97 Nasal Cannula 3.0 


 


9/11/17 19:02 36.7 68 22 148/82 (104) 97 Nasal Cannula 4.0 


 


9/11/17 16:00      Nasal Cannula 2.0 


 


9/11/17 15:33 36.7 78 22 125/79 (94) 97 High Flow Oxygen 8.0 





      Mask  


 


9/11/17 15:26  82 18  92 Nasal Cannula 4.0 








Laboratory Results:





Last 24 Hours








Test


  9/11/17


16:35 9/12/17


05:39 9/12/17


10:17 9/12/17


12:50


 


Urine Color YELLOW    


 


Urine Appearance CLEAR    


 


Urine pH 5.5    


 


Urine Specific Gravity 1.019    


 


Urine Protein NEG    


 


Urine Glucose (UA) NEG    


 


Urine Ketones TRACE    


 


Urine Occult Blood NEG    


 


Urine Nitrite NEG    


 


Urine Bilirubin NEG    


 


Urine Urobilinogen NEG    


 


Urine Leukocyte Esterase NEG    


 


White Blood Count  16.19 K/uL   


 


Red Blood Count  3.41 M/uL   


 


Hemoglobin  11.1 g/dL   


 


Hematocrit  30.4 %   


 


Mean Corpuscular Volume  89.1 fL   


 


Mean Corpuscular Hemoglobin  32.6 pg   


 


Mean Corpuscular Hemoglobin


Concent 


  36.5 g/dl 


  


  


 


 


RDW Standard Deviation  42.3 fL   


 


RDW Coefficient of Variation  13.1 %   


 


Platelet Count  270 K/uL   


 


Mean Platelet Volume  9.0 fL   


 


Sodium Level  127 mmol/L  126 mmol/L  


 


Potassium Level  3.0 mmol/L  3.5 mmol/L  


 


Chloride Level  93 mmol/L  93 mmol/L  


 


Carbon Dioxide Level  27 mmol/L  28 mmol/L  


 


Anion Gap  7.0 mmol/L  5.0 mmol/L  


 


Blood Urea Nitrogen  15 mg/dl  14 mg/dl  


 


Creatinine  0.76 mg/dl  0.77 mg/dl  


 


Est Creatinine Clear Calc


Drug Dose 


  70.9 ml/min 


  70.0 ml/min 


  


 


 


Estimated GFR (


American) 


  92.1 


  90.7 


  


 


 


Estimated GFR (Non-


American 


  79.5 


  78.2 


  


 


 


BUN/Creatinine Ratio  19.9  18.7  


 


Random Glucose  139 mg/dl  106 mg/dl  


 


Calcium Level  8.3 mg/dl  8.6 mg/dl  


 


Magnesium Level  1.8 mg/dl

## 2017-09-12 NOTE — ANESTHESIOLOGY PROGRESS NOTE
Anesthesia Progress Note


Date of Service


Sep 12, 2017.





Progress Notes


The patient is a 70 y/o female scheduled for a L VATS tomorrow with Dr. Macias.  She is a long time smoker and noted L sided chest pain after a fit 

of coughing.  She went to the hospital and was found to have a L pneumothorax.  

A chest tube was placed and subsequent imaging has revealed a possible L lung 

abscess.   PMH includes asthma, COPD, CAD, HTN, dyslipidemia, GERD,lumbago, 

hyponatremia of unknown origin, anemia, and recent steroid use for COPD 

exacerbation.  She has a 50 pack year smoking history.  She had a previous CABG 

for atrial myxoma with no anesthesia problems.  Her recent chest CT showed a L 

sided chest tube, 30% ptx, and extensive subcutaneous emphysema.  Her EKG shows 

sinus bradycardia with AV block.  Labs are significant for WBC 16.2, hgb 11.1, 

sodium 126, and chloride 93.  Her current SpO2 is 95 on 3 liters NC.  On exam 

the patient was sitting comfortably on her bed although she is anxious about 

having surgery.  She had good neck ROM and has a MP 1 airway.  She is 

edentulous.  She has wheezing in bilateral lung fields with some decreased 

breath sounds in the lower left lung.  Heart is RRR.  No carotid bruit noted.  

She is an ASA 3.  The patient had a type and screen placed.  Her electrolytes 

will be checked tomorrow.  She was consented for general anesthesia with 

arterial line placement.  She was counseled to remain NPO after midnight except 

for sips of water with pills.

## 2017-09-12 NOTE — SURGERY PROGRESS NOTE
DATE: 09/12/2017

 

This patient still has a persistent air leak.  I reviewed this case 

again radiographically.  It appears to me that while she is improved from a

clinical standpoint and is now on room air with 92% saturation.  I am still

concerned about this air leak.  She is not draining very much.  We have

really grown nothing from our cultures.  Her white count this morning is

16,190.  Hemoglobin is 11.1.  Her BUN and creatinine are normal.  Sodium is a

bit low at 127 and potassium is 3.0.  This will need to be corrected before

we go to the operating room tomorrow.  I had a long talk with the patient

about the operating room and what to expect.  Quite frankly, I am not sure

what is going on with her left lung.  We are assuming this is an abscess based 
on her

CT Scan; however, clinically, she really does not give a history entirely 
consistent

with an abscess.  I would expect her to be sicker.  I will discuss this with

infectious disease.  In the meantime, I am going to take her to the operating

room and do a robotic thoracoscopic evaluation.  I may proceed with a wedge

resection or even a left lower lobectomy as it is completely consolidated. 

On the other hand, if I find an obvious leak and we can fix this that might

be preferable, although I am still very concerned about what is going on her

left lower lobe.

 

 

 

 

KELSI

## 2017-09-12 NOTE — DIAGNOSTIC IMAGING REPORT
CHEST ONE VIEW PORTABLE



CLINICAL HISTORY: CHEST TUBE CAME OUT    



COMPARISON STUDY:  Chest radiograph September 11, 2017.



FINDINGS: Left chest tube is no longer in place. Extensive subcutaneous gas

within left chest wall is noted. A small amount of gas within the left lower

neck is noted. A small left pneumothorax is now visualized. Left mid and lower

lung airspace opacity has slightly increased. There are median sternotomy wires.





IMPRESSION:  



1. Interval development of a small left pneumothorax. Left chest tube no longer

in place.



2. Increase in left lower lung airspace opacity. Possible small left pleural

effusion.









Electronically signed by:  Tam Kraus M.D.

9/12/2017 9:51 PM



Dictated Date/Time:  9/12/2017 9:48 PM

## 2017-09-12 NOTE — PROGRESS NOTE
Subjective


Date of Service:


Sep 12, 2017.


Subjective


Pt evaluation today including:  conversation w/ patient, physical exam, chart 

review, lab review


pt with some pain at chest tube site, had cough today, non productive. no f/c. 

tolerating abx. all micro negative to date. no abd pain, no n/v/d. no f/c. For 

biopsy tomorrow. min fluid in chest tube, serosang. all remaining ros reviewed 

and are negative.





Objective


Vital Signs











  Date Time  Temp Pulse Resp B/P (MAP) Pulse Ox O2 Delivery O2 Flow Rate FiO2


 


9/12/17 12:00     95 Nasal Cannula 3.0 


 


9/12/17 11:14  94 15  91 Room Air  


 


9/12/17 08:22 36.4 71 18 147/84 (105) 92 Room Air  


 


9/12/17 08:21     92 Room Air  


 


9/12/17 08:00     97 Nasal Cannula 3.0 


 


9/12/17 07:51  59 18  98 Nasal Cannula 3.0 


 


9/12/17 05:30 36.8 73 18 110/63 (79) 94 Nasal Cannula 2.0 


 


9/12/17 04:10     96 Nasal Cannula 2.0 


 


9/12/17 00:05     96 Nasal Cannula 2.0 


 


9/11/17 23:45 36.8 74 20 154/76 (102) 96 Nasal Cannula  


 


9/11/17 23:07  70 18  97 Nasal Cannula 4.0 


 


9/11/17 20:21  73 18  95 Nasal Cannula 4.0 


 


9/11/17 20:15     97 Nasal Cannula 2.0 


 


9/11/17 19:45 36.9 81 20 168/70 (102) 97 Nasal Cannula 3.0 


 


9/11/17 19:02 36.7 68 22 148/82 (104) 97 Nasal Cannula 4.0 


 


9/11/17 16:00      Nasal Cannula 2.0 


 


9/11/17 15:33 36.7 78 22 125/79 (94) 97 High Flow Oxygen 8.0 





      Mask  


 


9/11/17 15:26  82 18  92 Nasal Cannula 4.0 











Physical Exam


General Appearance:  WD/WN, no apparent distress


Eyes:  normal inspection, EOMI


Neck:  supple


Respiratory/Chest:  + decreased breath sounds


Cardiovascular:  regular rate, rhythm, no edema


Abdomen:  non tender, soft


Extremities:  non-tender, normal inspection, no pedal edema


Neurologic/Psychiatric:  alert, oriented x 3


Skin:  normal color, warm/dry, no rash





Laboratory Results











Item Value  Date Time


 


Blood Culture - Preliminary Resulted 9/10/17 1931





Blood NO GROWTH TO DATE. 


 


Blood Culture - Preliminary Resulted 9/10/17 1937





Blood NO GROWTH TO DATE. 


 


Gram Stain - Final Resulted 9/10/17 2300





Sputum Expectorated Sputum  











Last 24 Hours








Test


  9/11/17


16:35 9/12/17


05:39 9/12/17


10:17 9/12/17


12:50


 


Urine Color YELLOW    


 


Urine Appearance CLEAR    


 


Urine pH 5.5    


 


Urine Specific Gravity 1.019    


 


Urine Protein NEG    


 


Urine Glucose (UA) NEG    


 


Urine Ketones TRACE    


 


Urine Occult Blood NEG    


 


Urine Nitrite NEG    


 


Urine Bilirubin NEG    


 


Urine Urobilinogen NEG    


 


Urine Leukocyte Esterase NEG    


 


White Blood Count  16.19 K/uL   


 


Red Blood Count  3.41 M/uL   


 


Hemoglobin  11.1 g/dL   


 


Hematocrit  30.4 %   


 


Mean Corpuscular Volume  89.1 fL   


 


Mean Corpuscular Hemoglobin  32.6 pg   


 


Mean Corpuscular Hemoglobin


Concent 


  36.5 g/dl 


  


  


 


 


RDW Standard Deviation  42.3 fL   


 


RDW Coefficient of Variation  13.1 %   


 


Platelet Count  270 K/uL   


 


Mean Platelet Volume  9.0 fL   


 


Sodium Level  127 mmol/L  126 mmol/L  


 


Potassium Level  3.0 mmol/L  3.5 mmol/L  


 


Chloride Level  93 mmol/L  93 mmol/L  


 


Carbon Dioxide Level  27 mmol/L  28 mmol/L  


 


Anion Gap  7.0 mmol/L  5.0 mmol/L  


 


Blood Urea Nitrogen  15 mg/dl  14 mg/dl  


 


Creatinine  0.76 mg/dl  0.77 mg/dl  


 


Est Creatinine Clear Calc


Drug Dose 


  70.9 ml/min 


  70.0 ml/min 


  


 


 


Estimated GFR (


American) 


  92.1 


  90.7 


  


 


 


Estimated GFR (Non-


American 


  79.5 


  78.2 


  


 


 


BUN/Creatinine Ratio  19.9  18.7  


 


Random Glucose  139 mg/dl  106 mg/dl  


 


Calcium Level  8.3 mg/dl  8.6 mg/dl  


 


Magnesium Level  1.8 mg/dl   











Assessment and Plan





(1) Pleural effusion


Assessment & Plan:  ? infectious vs malignant. cultures negative so far, will 

continue abx pending biopsy findings. please send culture and path. will follow





(2) Pneumothorax

## 2017-09-13 VITALS
HEART RATE: 104 BPM | SYSTOLIC BLOOD PRESSURE: 123 MMHG | TEMPERATURE: 98.6 F | OXYGEN SATURATION: 92 % | DIASTOLIC BLOOD PRESSURE: 82 MMHG

## 2017-09-13 VITALS
OXYGEN SATURATION: 95 % | DIASTOLIC BLOOD PRESSURE: 74 MMHG | SYSTOLIC BLOOD PRESSURE: 118 MMHG | TEMPERATURE: 98.42 F | HEART RATE: 77 BPM

## 2017-09-13 VITALS
OXYGEN SATURATION: 96 % | DIASTOLIC BLOOD PRESSURE: 68 MMHG | HEART RATE: 81 BPM | TEMPERATURE: 98.42 F | SYSTOLIC BLOOD PRESSURE: 115 MMHG

## 2017-09-13 VITALS
HEART RATE: 75 BPM | SYSTOLIC BLOOD PRESSURE: 138 MMHG | DIASTOLIC BLOOD PRESSURE: 82 MMHG | OXYGEN SATURATION: 94 % | TEMPERATURE: 97.88 F

## 2017-09-13 VITALS — HEART RATE: 114 BPM | OXYGEN SATURATION: 94 %

## 2017-09-13 VITALS — HEART RATE: 93 BPM | OXYGEN SATURATION: 94 %

## 2017-09-13 VITALS
HEART RATE: 98 BPM | DIASTOLIC BLOOD PRESSURE: 83 MMHG | SYSTOLIC BLOOD PRESSURE: 135 MMHG | TEMPERATURE: 98.42 F | OXYGEN SATURATION: 94 %

## 2017-09-13 VITALS — SYSTOLIC BLOOD PRESSURE: 137 MMHG | HEART RATE: 75 BPM | DIASTOLIC BLOOD PRESSURE: 81 MMHG | OXYGEN SATURATION: 94 %

## 2017-09-13 VITALS
TEMPERATURE: 98.42 F | DIASTOLIC BLOOD PRESSURE: 63 MMHG | SYSTOLIC BLOOD PRESSURE: 145 MMHG | OXYGEN SATURATION: 94 % | HEART RATE: 72 BPM

## 2017-09-13 VITALS — OXYGEN SATURATION: 94 %

## 2017-09-13 VITALS — OXYGEN SATURATION: 95 % | HEART RATE: 71 BPM

## 2017-09-13 VITALS — HEART RATE: 74 BPM | OXYGEN SATURATION: 96 %

## 2017-09-13 VITALS — OXYGEN SATURATION: 95 %

## 2017-09-13 LAB
ANION GAP SERPL CALC-SCNC: 6 MMOL/L (ref 3–11)
BUN SERPL-MCNC: 12 MG/DL (ref 7–18)
BUN/CREAT SERPL: 16.8 (ref 10–20)
CALCIUM SERPL-MCNC: 9.2 MG/DL (ref 8.5–10.1)
CHLORIDE SERPL-SCNC: 100 MMOL/L (ref 98–107)
CO2 SERPL-SCNC: 28 MMOL/L (ref 21–32)
CREAT CL PREDICTED SERPL C-G-VRATE: 77.3 ML/MIN
CREAT SERPL-MCNC: 0.71 MG/DL (ref 0.6–1.2)
EOSINOPHIL NFR BLD AUTO: 336 K/UL (ref 130–400)
GLUCOSE SERPL-MCNC: 132 MG/DL (ref 70–99)
HCT VFR BLD CALC: 34.5 % (ref 37–47)
MAGNESIUM SERPL-MCNC: 2 MG/DL (ref 1.8–2.4)
MCH RBC QN AUTO: 30.8 PG (ref 25–34)
MCHC RBC AUTO-ENTMCNC: 33.9 G/DL (ref 32–36)
MCV RBC AUTO: 90.8 FL (ref 80–100)
PMV BLD AUTO: 9.2 FL (ref 7.4–10.4)
POTASSIUM SERPL-SCNC: 3.9 MMOL/L (ref 3.5–5.1)
RBC # BLD AUTO: 3.8 M/UL (ref 4.2–5.4)
SODIUM SERPL-SCNC: 134 MMOL/L (ref 136–145)
WBC # BLD AUTO: 12.53 K/UL (ref 4.8–10.8)

## 2017-09-13 PROCEDURE — 0B9G8ZX DRAINAGE OF LEFT UPPER LUNG LOBE, VIA NATURAL OR ARTIFICIAL OPENING ENDOSCOPIC, DIAGNOSTIC: ICD-10-PCS | Performed by: THORACIC SURGERY (CARDIOTHORACIC VASCULAR SURGERY)

## 2017-09-13 PROCEDURE — 0BBG4ZX EXCISION OF LEFT UPPER LUNG LOBE, PERCUTANEOUS ENDOSCOPIC APPROACH, DIAGNOSTIC: ICD-10-PCS | Performed by: THORACIC SURGERY (CARDIOTHORACIC VASCULAR SURGERY)

## 2017-09-13 PROCEDURE — 8E0W4CZ ROBOTIC ASSISTED PROCEDURE OF TRUNK REGION, PERCUTANEOUS ENDOSCOPIC APPROACH: ICD-10-PCS | Performed by: THORACIC SURGERY (CARDIOTHORACIC VASCULAR SURGERY)

## 2017-09-13 RX ADMIN — CLINDAMYCIN SCH MLS/HR: 150 INJECTION, SOLUTION INTRAMUSCULAR; INTRAVENOUS at 20:48

## 2017-09-13 RX ADMIN — GABAPENTIN SCH MG: 300 CAPSULE ORAL at 20:32

## 2017-09-13 RX ADMIN — Medication SCH TAB: at 08:06

## 2017-09-13 RX ADMIN — LEVALBUTEROL SCH MG: 1.25 SOLUTION, CONCENTRATE RESPIRATORY (INHALATION) at 19:00

## 2017-09-13 RX ADMIN — IPRATROPIUM BROMIDE SCH MG: 0.5 SOLUTION RESPIRATORY (INHALATION) at 03:16

## 2017-09-13 RX ADMIN — HYDROMORPHONE HYDROCHLORIDE PRN MG: 2 INJECTION, SOLUTION INTRAMUSCULAR; INTRAVENOUS; SUBCUTANEOUS at 15:24

## 2017-09-13 RX ADMIN — MONTELUKAST SODIUM SCH MG: 10 TABLET, FILM COATED ORAL at 20:33

## 2017-09-13 RX ADMIN — LEVALBUTEROL SCH MG: 1.25 SOLUTION, CONCENTRATE RESPIRATORY (INHALATION) at 07:21

## 2017-09-13 RX ADMIN — DEXTROSE AND SODIUM CHLORIDE SCH MLS/HR: 5; .45 INJECTION, SOLUTION INTRAVENOUS at 16:28

## 2017-09-13 RX ADMIN — NICOTINE SCH PATCH: 7 PATCH, EXTENDED RELEASE TRANSDERMAL at 08:05

## 2017-09-13 RX ADMIN — MORPHINE SULFATE PRN MG: 2 INJECTION, SOLUTION INTRAMUSCULAR; INTRAVENOUS at 23:50

## 2017-09-13 RX ADMIN — IPRATROPIUM BROMIDE SCH MG: 0.5 SOLUTION RESPIRATORY (INHALATION) at 07:21

## 2017-09-13 RX ADMIN — Medication SCH MG: at 08:06

## 2017-09-13 RX ADMIN — HYDROMORPHONE HYDROCHLORIDE PRN MG: 2 INJECTION, SOLUTION INTRAMUSCULAR; INTRAVENOUS; SUBCUTANEOUS at 15:14

## 2017-09-13 RX ADMIN — METOPROLOL TARTRATE SCH MG: 50 TABLET, FILM COATED ORAL at 16:29

## 2017-09-13 RX ADMIN — ATORVASTATIN CALCIUM SCH MG: 10 TABLET, FILM COATED ORAL at 20:32

## 2017-09-13 RX ADMIN — IPRATROPIUM BROMIDE SCH MG: 0.5 SOLUTION RESPIRATORY (INHALATION) at 16:20

## 2017-09-13 RX ADMIN — GABAPENTIN SCH MG: 300 CAPSULE ORAL at 08:06

## 2017-09-13 RX ADMIN — IMIPENEM AND CILASTATIN SODIUM SCH MLS/HR: 500; 500 INJECTION, POWDER, FOR SOLUTION INTRAVENOUS at 16:28

## 2017-09-13 RX ADMIN — HYDROMORPHONE HYDROCHLORIDE PRN MG: 2 INJECTION, SOLUTION INTRAMUSCULAR; INTRAVENOUS; SUBCUTANEOUS at 15:09

## 2017-09-13 RX ADMIN — BUDESONIDE AND FORMOTEROL FUMARATE DIHYDRATE SCH PUFFS: 160; 4.5 AEROSOL RESPIRATORY (INHALATION) at 07:27

## 2017-09-13 RX ADMIN — RANITIDINE SCH MG: 150 TABLET ORAL at 20:33

## 2017-09-13 RX ADMIN — BUDESONIDE AND FORMOTEROL FUMARATE DIHYDRATE SCH PUFFS: 160; 4.5 AEROSOL RESPIRATORY (INHALATION) at 20:31

## 2017-09-13 RX ADMIN — HYDROMORPHONE HYDROCHLORIDE PRN MG: 2 INJECTION, SOLUTION INTRAMUSCULAR; INTRAVENOUS; SUBCUTANEOUS at 15:19

## 2017-09-13 RX ADMIN — MORPHINE SULFATE PRN MG: 4 INJECTION, SOLUTION INTRAMUSCULAR; INTRAVENOUS at 18:02

## 2017-09-13 RX ADMIN — ACETAMINOPHEN SCH MLS/HR: 10 INJECTION, SOLUTION INTRAVENOUS at 20:30

## 2017-09-13 RX ADMIN — PANTOPRAZOLE SCH MG: 40 TABLET, DELAYED RELEASE ORAL at 08:07

## 2017-09-13 RX ADMIN — DOCUSATE SODIUM SCH MG: 100 CAPSULE, LIQUID FILLED ORAL at 08:06

## 2017-09-13 RX ADMIN — IPRATROPIUM BROMIDE SCH MG: 0.5 SOLUTION RESPIRATORY (INHALATION) at 11:23

## 2017-09-13 RX ADMIN — IMIPENEM AND CILASTATIN SODIUM SCH MLS/HR: 500; 500 INJECTION, POWDER, FOR SOLUTION INTRAVENOUS at 04:37

## 2017-09-13 RX ADMIN — Medication SCH MG: at 20:32

## 2017-09-13 RX ADMIN — LEVALBUTEROL SCH MG: 1.25 SOLUTION, CONCENTRATE RESPIRATORY (INHALATION) at 03:16

## 2017-09-13 RX ADMIN — HYDROMORPHONE HYDROCHLORIDE PRN MG: 1 INJECTION, SOLUTION INTRAMUSCULAR; INTRAVENOUS; SUBCUTANEOUS at 16:21

## 2017-09-13 RX ADMIN — LEVALBUTEROL SCH MG: 1.25 SOLUTION, CONCENTRATE RESPIRATORY (INHALATION) at 16:20

## 2017-09-13 RX ADMIN — METOCLOPRAMIDE SCH MG: 5 INJECTION, SOLUTION INTRAMUSCULAR; INTRAVENOUS at 22:07

## 2017-09-13 RX ADMIN — IPRATROPIUM BROMIDE SCH MG: 0.5 SOLUTION RESPIRATORY (INHALATION) at 19:00

## 2017-09-13 RX ADMIN — OXYCODONE HYDROCHLORIDE PRN MG: 5 TABLET ORAL at 07:24

## 2017-09-13 RX ADMIN — METOPROLOL TARTRATE SCH MG: 50 TABLET, FILM COATED ORAL at 07:31

## 2017-09-13 RX ADMIN — MORPHINE SULFATE PRN MG: 2 INJECTION, SOLUTION INTRAMUSCULAR; INTRAVENOUS at 20:30

## 2017-09-13 RX ADMIN — IMIPENEM AND CILASTATIN SODIUM SCH MLS/HR: 500; 500 INJECTION, POWDER, FOR SOLUTION INTRAVENOUS at 21:45

## 2017-09-13 RX ADMIN — GABAPENTIN SCH MG: 300 CAPSULE ORAL at 14:00

## 2017-09-13 RX ADMIN — LOSARTAN POTASSIUM SCH MG: 50 TABLET, FILM COATED ORAL at 07:25

## 2017-09-13 RX ADMIN — IMIPENEM AND CILASTATIN SODIUM SCH MLS/HR: 500; 500 INJECTION, POWDER, FOR SOLUTION INTRAVENOUS at 09:16

## 2017-09-13 RX ADMIN — FLUTICASONE PROPIONATE AND SALMETEROL SCH PUFF: 50; 250 POWDER RESPIRATORY (INHALATION) at 20:30

## 2017-09-13 RX ADMIN — FLUTICASONE PROPIONATE AND SALMETEROL SCH PUFF: 50; 250 POWDER RESPIRATORY (INHALATION) at 08:05

## 2017-09-13 RX ADMIN — LEVALBUTEROL SCH MG: 1.25 SOLUTION, CONCENTRATE RESPIRATORY (INHALATION) at 11:24

## 2017-09-13 NOTE — DIAGNOSTIC IMAGING REPORT
CHEST ONE VIEW PORTABLE



CLINICAL HISTORY: Left lung resection.    



COMPARISON STUDY:  Chest radiograph September 12, 2017.



FINDINGS: Extensive subcutaneous gas within left chest wall and lower neck has

increased since prior exam. There has been interval placement of a left-sided

chest tube. A small left pneumothorax is noted with superior pleural separation

of 1.6 cm. This is slightly decreased in size since prior exam. There is left

lung airspace opacity. Cardiac size is stable. There are median sternotomy

wires. 



IMPRESSION:  



1. Interval placement of a left-sided chest tube. Small left pneumothorax,

decreased in size since prior exam.



2. Slight increase in left lung airspace opacity.



3. Increase in subcutaneous gas within left chest wall and lower neck.







Electronically signed by:  Tam Kraus M.D.

9/13/2017 3:36 PM



Dictated Date/Time:  9/13/2017 3:35 PM

## 2017-09-13 NOTE — ANESTHESIOLOGY PROGRESS NOTE
Anesthesia Post Op Note


Date & Time


Sep 13, 2017 at 15:48





Vital Signs


Pain Intensity:  2





Vital Signs Past 12 Hours








  Date Time  Temp Pulse Resp B/P (MAP) Pulse Ox O2 Delivery O2 Flow Rate FiO2


 


9/13/17 15:40 36.8 74 16 130/64 97 Oxymask 10 


 


9/13/17 15:30  69 16 130/63 97 Oxymask 10 


 


9/13/17 15:20 36.0 75 16 126/70 100 Oxymask 10 


 


9/13/17 15:10  76 16 102/64 100 Oxymask 10 


 


9/13/17 15:01 35.5 94 16 107/72 100 Oxymask 10 


 


9/13/17 10:43 37 82 18 125/84 (98) 95 Nasal Cannula 2 


 


9/13/17 08:00 36.9 98 20 135/83 (100) 94 Nasal Cannula 2.0 


 


9/13/17 08:00      Nasal Cannula 2.0 


 


9/13/17 07:22  114 16  94 Nasal Cannula 2.0 


 


9/13/17 05:34 37.0 104 20 123/82 (96) 92 Nasal Cannula 2.0 


 


9/13/17 04:00     95 Nasal Cannula 2.0 











Notes


Mental Status:  alert / awake / arousable, participated in evaluation


Pt Amnestic to Procedure:  Yes


Nausea / Vomiting:  adequately controlled


Pain:  adequately controlled


Airway Patency, RR, SpO2:  stable & adequate


BP & HR:  stable & adequate


Hydration State:  stable & adequate


Anesthetic Complications:  no major complications apparent

## 2017-09-13 NOTE — OPERATIVE REPORT
DATE OF OPERATION:  09/13/2017

 

PREOPERATIVE DIAGNOSIS:  Apparent abscess with consolidation in the left

lower lobe.

 

POSTOPERATIVE DIAGNOSIS:  

1.  Apparent abscess in the inferior medial aspect of the left upper lobe.  

2.  Mucus plugging.

 

PROCEDURE:  Robot-assisted left thoracoscopic evacuation of pleural contents

and wedge resection of left upper lobe abnormality.  

 

SURGEON:  Dr. Macias.

 

ASSISTANT:  Donte Velarde PA-C.

 

ANESTHESIA:  General anesthesia endotracheal intubation with double lumen

tube.

 

SPECIFICS OF PROCEDURE:  This is a 71-year-old female who presented with a

spontaneous pneumothorax and was found to have complete consolidation of the

left lower lobe which appeared to be an  abscess which

appeared to me to be coming from the lower lobe.  A chest tube was placed

which was not in correct position and then this was replaced by another chest

tube a few days ago.  She responded  nicely to this however this tube came

out inadvertently last night.  She was in no respiratory distress.  We repeated 
a chest

x-ray.  It showed that her  pneumothorax is quite small.  I elected to leave

the tube out and take her to the operating room this morning.  



On  09/13/2017 the patient was brought to the operating room and underwent an

uncomplicated robotic assisted left thoracoscopy.  A few adhesions were taken

down, but what really stood out to me was the fact that her left lower lobe

looked fine, it was  just atelectatic.  I could palpate no abnormalities. 

She really did not have much in the way of pleural content.  She did have a

very large purple ballottable area in her fissure which was purplish.  After

dissecting out the fissure it could be seen that this was actually coming

from the upper lobe medially along the lingular segment.  It was rather

large.  I opened it up and there  old blood inside of it.    I then wedged

out a large segment of this and  closed the pleura using a running 3-0

suture.   I irrigated the chest, we had very little in the way of an air

leak.  Exparel block was done with 266 mg mixed in 60 mL normal saline and

used to inject each interspace medially.  

 

PROCEDURE:  The patient was brought to the operating room and laid in supine

position.  General anesthesia induced and endotracheal intubation was

performed with a double lumen tube.  The patient has placed right lateral

decubitus position.  Left chest prepped and draped in usual sterile fashion. 

Three working arms as well as a  camera port were placed anteriorly between

the 2 anterior incisions but a couple interspaces below.  There were some

adhesions that were taken down bluntly and sharply but what really set out to

me was the  fact that there was some fibrinous material which was handed off

but this cleaned the pleural cavity nicely.  I saw him no  significant

lymphadenopathy.  The fluid was sent for culture, sensitivity and cytology. 

After irrigating out the chest  I then noted that there was a purple

ballottable  area between the fissures.  After  this from the lower

lobe it  could be seen this was coming from the upper lobe.  I opened this

area, it was old blood which was sent for culture.  I then wedged this entire

area out after opening it up and closed the defect using a running 2-0

suture.  This came together nicely after I had wedged out the part of the

lingula  medially.  We did not have much of an air leak.  We then extubated

the patient and reintubated her with an 8.5 mm tube.  I then went with my

bronchoscope and it could be seen there was a tremendous amount of sputum,

especially on the left side.  This was inspissated. After

irrigating it out multiple times I was able to open it up and I saw no

abnormalities of the left lower lobe except for some mucosal heaping but

really there was no extrinsic compression and did not appear that there was

any mass effect.

 

A 24-Armenian chest tube was placed in the anterior thoracoscopy port and

sutured in place with heavy silk suture.  The other port sites were closed with 
0

Vicryl to close the muscle layers and 4-0 Vicryl to close the skin layers. 

There is a very tiny air leak, but she awakened without difficulty  from the

procedure.  We did do an Exparel block with 266 mg of Exparel and 60 mL total

normal saline.  All in all I  was quite happy with her and her  lung appeared

 to be expanded on chest x-ray.

 

 

I attest to the content of the Intraoperative Record and any orders documented 
therein. Any exceptions are noted below.

 

 

 

MTDD

## 2017-09-13 NOTE — PROGRESS NOTE
Subjective


Date of Service:


Sep 13, 2017.


Subjective


pt off of floor for biopsy, remains afebrile. remains on imipenem emperically. 

no f/c. no overnight events. all micro remains negative.





Objective


Vital Signs











  Date Time  Temp Pulse Resp B/P (MAP) Pulse Ox O2 Delivery O2 Flow Rate FiO2


 


9/13/17 10:43 37 82 18 125/84 (98) 95 Nasal Cannula 2 


 


9/13/17 08:00 36.9 98 20 135/83 (100) 94 Nasal Cannula 2.0 


 


9/13/17 08:00      Nasal Cannula 2.0 


 


9/13/17 07:22  114 16  94 Nasal Cannula 2.0 


 


9/13/17 05:34 37.0 104 20 123/82 (96) 92 Nasal Cannula 2.0 


 


9/13/17 04:00     95 Nasal Cannula 2.0 


 


9/13/17 03:16  93 16  94 Nasal Cannula 2.0 


 


9/13/17 00:00     95 Nasal Cannula 2.0 


 


9/13/17 00:00 36.9 72 18 145/63 (90) 94   


 


9/12/17 23:32  97 16  94 Nasal Cannula 2.0 


 


9/12/17 20:00     93 Nasal Cannula 2.0 


 


9/12/17 19:45  89 16  94 Nasal Cannula 2.0 


 


9/12/17 19:00 37.3 91 19 131/54 (79) 93 Nasal Cannula 2.0 


 


9/12/17 16:00     96 Nasal Cannula 3.0 


 


9/12/17 15:42     92 Room Air  


 


9/12/17 15:40 36.2 80 20 149/79 (102) 92 Room Air  


 


9/12/17 15:30 36.2 85 16 149/79 (102) 95 Nasal Cannula 2.0 


 


9/12/17 12:00     95 Nasal Cannula 3.0 











Laboratory Results











Item Value  Date Time


 


Gram Stain - Final Complete 9/10/17 2300





Sputum Expectorated Sputum  


 


Blood Culture - Preliminary Resulted 9/10/17 1937





Blood NO GROWTH TO DATE. 


 


Blood Culture - Preliminary Resulted 9/10/17 1931





Blood NO GROWTH TO DATE. 











Last 24 Hours








Test


  9/13/17


05:35 9/13/17


06:01


 


Urine Osmolality 90 mOms/kg  


 


White Blood Count  12.53 K/uL 


 


Red Blood Count  3.80 M/uL 


 


Hemoglobin  11.7 g/dL 


 


Hematocrit  34.5 % 


 


Mean Corpuscular Volume  90.8 fL 


 


Mean Corpuscular Hemoglobin  30.8 pg 


 


Mean Corpuscular Hemoglobin


Concent 


  33.9 g/dl 


 


 


RDW Standard Deviation  44.6 fL 


 


RDW Coefficient of Variation  13.4 % 


 


Platelet Count  336 K/uL 


 


Mean Platelet Volume  9.2 fL 


 


Sodium Level  134 mmol/L 


 


Potassium Level  3.9 mmol/L 


 


Chloride Level  100 mmol/L 


 


Carbon Dioxide Level  28 mmol/L 


 


Anion Gap  6.0 mmol/L 


 


Blood Urea Nitrogen  12 mg/dl 


 


Creatinine  0.71 mg/dl 


 


Est Creatinine Clear Calc


Drug Dose 


  77.3 ml/min 


 


 


Estimated GFR (


American) 


  100.0 


 


 


Estimated GFR (Non-


American 


  86.3 


 


 


BUN/Creatinine Ratio  16.8 


 


Random Glucose  132 mg/dl 


 


Calcium Level  9.2 mg/dl 


 


Magnesium Level  2.0 mg/dl 











Assessment and Plan





(1) Pleural effusion


Assessment & Plan:  ? infectious vs malignant. cultures negative so far, will 

continue abx pending biopsy findings. please send culture and path. will follow





(2) Pneumothorax

## 2017-09-13 NOTE — HISTORY & PHYSICAL BRIDGE NOTE
H&P Re-Evaluation


Bridge Note:


I have examined the patient, reviewed the History & Physical and in the 

interval since the performance of the History & Physical I have noted the 

following changes of clinical significance: Patient's chest tube came out last 

pm, but the CXR was stable, Will be on standby to insert chest tube if 

necessary upon intubation and positive pressure ventilation. She is clinically 

stable. Long discussion with patient's daughter yesterday. No changes noted

## 2017-09-13 NOTE — PROGRESS NOTE
Internal Med Progress Note


Date of Service:


Sep 13, 2017.


Provider Documentation:





SUBJECTIVE:





Seen and examined at bedside after the procedure


Got robot-assisted left thoracoscopic evacuation of pleural contents and wedge 

resection of left upper lobe.  


States having soreness at the surgical site.


Denies SOB, dizziness


Offers no other complaints








OBJECTIVE:





Vital Signs-as noted below





Physical Exam:


General Appearance:Moderately built and nourished, no apparent distress


Head:  normocephalic, Atraumatic 


Eyes:  normal inspection, EOMI, PERRL


Neck:  supple, Trachea midline


Respiratory/Chest: Decreased breath sounds, CTA, + Left chest surgical site in 

bandage


Cardiovascular: S1, S2, No murmur


Abdomen/GI:Soft, Non tender, Bowel sounds present


Extremities/Musculoskelatal:normal inspection, no edema 


Neurologic/Psych:grossly no focal neurological deficits 


Skin:  normal color, warm





Lab data as noted below.


ASSESSMENT & PLAN:


Patient is a 71 yr female with H/O CAD/CABG, HTN, COPD presenting with left 

sided chest pain.








LEFT SIDED PNEUMOTHORAX


Left upper lobe Abscess/Mucus Plugging S/P wedge resection of left upper lobe 

abnormality POD # 0  


chest tube readjusted after initial Chest tube placed in [place] [facility] which 

was displaced 


CT chest showed 30% left-sided pneumothorax, extensive subcutaneous emphysema 

over the left hemithorax and small L effusion with partial LLL atelectasis


Appreciate Pulmonology/CT surgery help 


Patient had L  thoracoscopic evacuation of pleural contents and wedge resection 

of left upper lobe abnormality on 9/13  


Continue Primaxin 


Appreciate ID Input


Blood/Sputum culture negative 


Bronchial cultures pending 








COPD


H/O chronic tobacco use 


Continue home inhalers   


pulmonology and CT surgery following 


counselled for smoking cessation 








CAD/CABG


Held ASA, Plavix for thoracoscopic eval 


continue Losartan, Metoprolol


hold Lasix for now 








Hyponatremia/Hypokalemia:


Replace and monitor 








HTN


continue Losartan, Metoprolol








DVT PX


SCDs for now 





Code Status


FULL CODE 





DISPOSITION


anticipate d/c home when medically stable


will need PT/OT eval prior to discharge 


follows with Family practice at [facility] 


would like to follow up with Pulmonology Dr Frazier in [place]


Vital Signs:











  Date Time  Temp Pulse Resp B/P (MAP) Pulse Ox O2 Delivery O2 Flow Rate FiO2


 


9/13/17 19:28 36.9 81 20 115/68 (84) 96 Nasal Cannula 2.0 


 


9/13/17 19:00  71 22  95 Nasal Cannula 2.5 


 


9/13/17 17:13  75 17 137/81 (99) 94 Nasal Cannula 4.0 


 


9/13/17 16:20  74 16  96 Nasal Cannula 3.0 


 


9/13/17 16:15     94 Nasal Cannula 4.0 


 


9/13/17 16:12 36.6 75 18 138/82 (100) 94 Nasal Cannula 4.0 


 


9/13/17 15:40 36.8 74 16 130/64 97 Oxymask 10 


 


9/13/17 15:30  69 16 130/63 97 Oxymask 10 


 


9/13/17 15:20 36.0 75 16 126/70 100 Oxymask 10 


 


9/13/17 15:10  76 16 102/64 100 Oxymask 10 


 


9/13/17 15:01 35.5 94 16 107/72 100 Oxymask 10 


 


9/13/17 10:43 37 82 18 125/84 (98) 95 Nasal Cannula 2 


 


9/13/17 08:00 36.9 98 20 135/83 (100) 94 Nasal Cannula 2.0 


 


9/13/17 08:00      Nasal Cannula 2.0 


 


9/13/17 07:22  114 16  94 Nasal Cannula 2.0 


 


9/13/17 05:34 37.0 104 20 123/82 (96) 92 Nasal Cannula 2.0 


 


9/13/17 04:00     95 Nasal Cannula 2.0 


 


9/13/17 03:16  93 16  94 Nasal Cannula 2.0 


 


9/13/17 00:00     95 Nasal Cannula 2.0 


 


9/13/17 00:00 36.9 72 18 145/63 (90) 94   


 


9/12/17 23:32  97 16  94 Nasal Cannula 2.0 


 


9/12/17 20:00     93 Nasal Cannula 2.0 


 


9/12/17 19:45  89 16  94 Nasal Cannula 2.0 








Lab Results:





Results Past 24 Hours








Test


  9/13/17


05:35 9/13/17


06:01 Range/Units


 


 


Urine Osmolality


  90


  


  500-800


mOms/kg


 


White Blood Count  12.53 4.8-10.8  K/uL


 


Red Blood Count  3.80 4.2-5.4  M/uL


 


Hemoglobin  11.7 12.0-16.0  g/dL


 


Hematocrit  34.5 37-47  %


 


Mean Corpuscular Volume  90.8   fL


 


Mean Corpuscular Hemoglobin  30.8 25-34  pg


 


Mean Corpuscular Hemoglobin


Concent 


  33.9


  32-36  g/dl


 


 


RDW Standard Deviation  44.6 36.4-46.3  fL


 


RDW Coefficient of Variation  13.4 11.5-14.5  %


 


Platelet Count  336 130-400  K/uL


 


Mean Platelet Volume  9.2 7.4-10.4  fL


 


Sodium Level  134 136-145  mmol/L


 


Potassium Level  3.9 3.5-5.1  mmol/L


 


Chloride Level  100   mmol/L


 


Carbon Dioxide Level  28 21-32  mmol/L


 


Anion Gap  6.0 3-11  mmol/L


 


Blood Urea Nitrogen  12 7-18  mg/dl


 


Creatinine


  


  0.71


  0.60-1.20


mg/dl


 


Est Creatinine Clear Calc


Drug Dose 


  77.3


   ml/min


 


 


Estimated GFR (


American) 


  100.0


   


 


 


Estimated GFR (Non-


American 


  86.3


   


 


 


BUN/Creatinine Ratio  16.8 10-20  


 


Random Glucose  132 70-99  mg/dl


 


Calcium Level  9.2 8.5-10.1  mg/dl


 


Magnesium Level  2.0 1.8-2.4  mg/dl








Microbiology Results


9/13/17 Fungal Smear, Received


          Pending


9/13/17 Fungal Culture, Received


          Pending


9/13/17 Acid Fast Stain, Received


          Pending


9/13/17 Mycobacterial Culture, Received


          Pending


9/13/17 Gram Stain, Received


          Pending


9/13/17 Bronchoalveolar Lavage Culture, Received


          Pending


9/13/17 Acid Fast Stain, Received


          Pending


9/13/17 Mycobacterial Culture, Received


          Pending


9/13/17 Fungal Smear - Final, Resulted


          


9/13/17 Fungal Culture, Resulted


          Pending


9/13/17 Gram Stain - Final, Resulted


          


9/13/17 Wound Culture, Resulted


          Pending


9/13/17 Gram Stain - Final, Resulted


          


9/13/17 Wound Culture, Resulted


          Pending

## 2017-09-14 VITALS — OXYGEN SATURATION: 95 % | HEART RATE: 83 BPM

## 2017-09-14 VITALS — OXYGEN SATURATION: 96 % | HEART RATE: 93 BPM

## 2017-09-14 VITALS — HEART RATE: 70 BPM | OXYGEN SATURATION: 95 %

## 2017-09-14 VITALS
SYSTOLIC BLOOD PRESSURE: 126 MMHG | OXYGEN SATURATION: 97 % | HEART RATE: 71 BPM | DIASTOLIC BLOOD PRESSURE: 77 MMHG | TEMPERATURE: 97.7 F

## 2017-09-14 VITALS
OXYGEN SATURATION: 94 % | SYSTOLIC BLOOD PRESSURE: 160 MMHG | HEART RATE: 64 BPM | DIASTOLIC BLOOD PRESSURE: 80 MMHG | TEMPERATURE: 97.52 F

## 2017-09-14 VITALS — OXYGEN SATURATION: 94 %

## 2017-09-14 VITALS — HEART RATE: 84 BPM | OXYGEN SATURATION: 95 %

## 2017-09-14 VITALS
OXYGEN SATURATION: 94 % | SYSTOLIC BLOOD PRESSURE: 120 MMHG | HEART RATE: 78 BPM | TEMPERATURE: 97.88 F | DIASTOLIC BLOOD PRESSURE: 70 MMHG

## 2017-09-14 VITALS
TEMPERATURE: 98.06 F | HEART RATE: 91 BPM | DIASTOLIC BLOOD PRESSURE: 66 MMHG | SYSTOLIC BLOOD PRESSURE: 110 MMHG | OXYGEN SATURATION: 96 %

## 2017-09-14 VITALS — HEART RATE: 84 BPM | SYSTOLIC BLOOD PRESSURE: 106 MMHG | DIASTOLIC BLOOD PRESSURE: 64 MMHG

## 2017-09-14 VITALS — OXYGEN SATURATION: 92 % | HEART RATE: 86 BPM

## 2017-09-14 VITALS — OXYGEN SATURATION: 94 % | HEART RATE: 74 BPM

## 2017-09-14 VITALS
SYSTOLIC BLOOD PRESSURE: 115 MMHG | TEMPERATURE: 98.06 F | DIASTOLIC BLOOD PRESSURE: 69 MMHG | HEART RATE: 88 BPM | OXYGEN SATURATION: 93 %

## 2017-09-14 VITALS
SYSTOLIC BLOOD PRESSURE: 128 MMHG | TEMPERATURE: 98.06 F | OXYGEN SATURATION: 95 % | DIASTOLIC BLOOD PRESSURE: 69 MMHG | HEART RATE: 79 BPM

## 2017-09-14 VITALS — HEART RATE: 78 BPM | OXYGEN SATURATION: 96 %

## 2017-09-14 VITALS — OXYGEN SATURATION: 91 % | HEART RATE: 88 BPM

## 2017-09-14 LAB
ANION GAP SERPL CALC-SCNC: 8 MMOL/L (ref 3–11)
BUN SERPL-MCNC: 8 MG/DL (ref 7–18)
BUN/CREAT SERPL: 10.4 (ref 10–20)
CALCIUM SERPL-MCNC: 8.8 MG/DL (ref 8.5–10.1)
CHLORIDE SERPL-SCNC: 100 MMOL/L (ref 98–107)
CO2 SERPL-SCNC: 25 MMOL/L (ref 21–32)
CREAT CL PREDICTED SERPL C-G-VRATE: 70.8 ML/MIN
CREAT SERPL-MCNC: 0.78 MG/DL (ref 0.6–1.2)
EOSINOPHIL NFR BLD AUTO: 323 K/UL (ref 130–400)
GLUCOSE SERPL-MCNC: 214 MG/DL (ref 70–99)
HCT VFR BLD CALC: 30.3 % (ref 37–47)
MAGNESIUM SERPL-MCNC: 2.1 MG/DL (ref 1.8–2.4)
MCH RBC QN AUTO: 30.9 PG (ref 25–34)
MCHC RBC AUTO-ENTMCNC: 33.7 G/DL (ref 32–36)
MCV RBC AUTO: 91.8 FL (ref 80–100)
PMV BLD AUTO: 9.3 FL (ref 7.4–10.4)
POTASSIUM SERPL-SCNC: 4 MMOL/L (ref 3.5–5.1)
RBC # BLD AUTO: 3.3 M/UL (ref 4.2–5.4)
SODIUM SERPL-SCNC: 133 MMOL/L (ref 136–145)
WBC # BLD AUTO: 18.25 K/UL (ref 4.8–10.8)

## 2017-09-14 RX ADMIN — Medication SCH MG: at 07:53

## 2017-09-14 RX ADMIN — ATORVASTATIN CALCIUM SCH MG: 10 TABLET, FILM COATED ORAL at 21:19

## 2017-09-14 RX ADMIN — KETOROLAC TROMETHAMINE PRN MG: 15 INJECTION INTRAMUSCULAR; INTRAVENOUS at 12:20

## 2017-09-14 RX ADMIN — IPRATROPIUM BROMIDE SCH MG: 0.5 SOLUTION RESPIRATORY (INHALATION) at 00:05

## 2017-09-14 RX ADMIN — METOCLOPRAMIDE SCH MG: 5 INJECTION, SOLUTION INTRAMUSCULAR; INTRAVENOUS at 06:00

## 2017-09-14 RX ADMIN — OXYCODONE HYDROCHLORIDE PRN MG: 5 TABLET ORAL at 10:11

## 2017-09-14 RX ADMIN — MORPHINE SULFATE PRN MG: 2 INJECTION, SOLUTION INTRAMUSCULAR; INTRAVENOUS at 04:14

## 2017-09-14 RX ADMIN — IMIPENEM AND CILASTATIN SODIUM SCH MLS/HR: 500; 500 INJECTION, POWDER, FOR SOLUTION INTRAVENOUS at 21:22

## 2017-09-14 RX ADMIN — DEXTROSE AND SODIUM CHLORIDE SCH MLS/HR: 5; .45 INJECTION, SOLUTION INTRAVENOUS at 01:56

## 2017-09-14 RX ADMIN — PANTOPRAZOLE SCH MG: 40 TABLET, DELAYED RELEASE ORAL at 07:54

## 2017-09-14 RX ADMIN — IPRATROPIUM BROMIDE SCH MG: 0.5 SOLUTION RESPIRATORY (INHALATION) at 15:48

## 2017-09-14 RX ADMIN — OXYCODONE HYDROCHLORIDE PRN MG: 5 TABLET ORAL at 19:19

## 2017-09-14 RX ADMIN — LEVALBUTEROL SCH MG: 1.25 SOLUTION, CONCENTRATE RESPIRATORY (INHALATION) at 07:27

## 2017-09-14 RX ADMIN — IPRATROPIUM BROMIDE SCH MG: 0.5 SOLUTION RESPIRATORY (INHALATION) at 07:27

## 2017-09-14 RX ADMIN — BUDESONIDE AND FORMOTEROL FUMARATE DIHYDRATE SCH PUFFS: 160; 4.5 AEROSOL RESPIRATORY (INHALATION) at 21:17

## 2017-09-14 RX ADMIN — IPRATROPIUM BROMIDE SCH MG: 0.5 SOLUTION RESPIRATORY (INHALATION) at 03:05

## 2017-09-14 RX ADMIN — GABAPENTIN SCH MG: 300 CAPSULE ORAL at 21:20

## 2017-09-14 RX ADMIN — LOSARTAN POTASSIUM SCH MG: 50 TABLET, FILM COATED ORAL at 07:54

## 2017-09-14 RX ADMIN — GABAPENTIN SCH MG: 300 CAPSULE ORAL at 07:54

## 2017-09-14 RX ADMIN — METOPROLOL TARTRATE SCH MG: 50 TABLET, FILM COATED ORAL at 06:10

## 2017-09-14 RX ADMIN — GABAPENTIN SCH MG: 300 CAPSULE ORAL at 14:05

## 2017-09-14 RX ADMIN — Medication SCH TAB: at 07:53

## 2017-09-14 RX ADMIN — Medication SCH MG: at 21:20

## 2017-09-14 RX ADMIN — LEVALBUTEROL SCH MG: 1.25 SOLUTION, CONCENTRATE RESPIRATORY (INHALATION) at 23:28

## 2017-09-14 RX ADMIN — RANITIDINE SCH MG: 150 TABLET ORAL at 21:19

## 2017-09-14 RX ADMIN — METOCLOPRAMIDE SCH MG: 5 INJECTION, SOLUTION INTRAMUSCULAR; INTRAVENOUS at 14:00

## 2017-09-14 RX ADMIN — CLINDAMYCIN SCH MLS/HR: 150 INJECTION, SOLUTION INTRAMUSCULAR; INTRAVENOUS at 01:56

## 2017-09-14 RX ADMIN — FLUTICASONE PROPIONATE AND SALMETEROL SCH PUFF: 50; 250 POWDER RESPIRATORY (INHALATION) at 07:51

## 2017-09-14 RX ADMIN — LEVALBUTEROL SCH MG: 1.25 SOLUTION, CONCENTRATE RESPIRATORY (INHALATION) at 11:05

## 2017-09-14 RX ADMIN — LORAZEPAM PRN MG: 1 TABLET ORAL at 23:09

## 2017-09-14 RX ADMIN — LEVALBUTEROL SCH MG: 1.25 SOLUTION, CONCENTRATE RESPIRATORY (INHALATION) at 00:05

## 2017-09-14 RX ADMIN — IMIPENEM AND CILASTATIN SODIUM SCH MLS/HR: 500; 500 INJECTION, POWDER, FOR SOLUTION INTRAVENOUS at 10:11

## 2017-09-14 RX ADMIN — DOCUSATE SODIUM SCH MG: 100 CAPSULE, LIQUID FILLED ORAL at 07:53

## 2017-09-14 RX ADMIN — IMIPENEM AND CILASTATIN SODIUM SCH MLS/HR: 500; 500 INJECTION, POWDER, FOR SOLUTION INTRAVENOUS at 04:24

## 2017-09-14 RX ADMIN — FLUTICASONE PROPIONATE AND SALMETEROL SCH PUFF: 50; 250 POWDER RESPIRATORY (INHALATION) at 21:00

## 2017-09-14 RX ADMIN — BUDESONIDE AND FORMOTEROL FUMARATE DIHYDRATE SCH PUFFS: 160; 4.5 AEROSOL RESPIRATORY (INHALATION) at 07:53

## 2017-09-14 RX ADMIN — IPRATROPIUM BROMIDE SCH MG: 0.5 SOLUTION RESPIRATORY (INHALATION) at 11:05

## 2017-09-14 RX ADMIN — IMIPENEM AND CILASTATIN SODIUM SCH MLS/HR: 500; 500 INJECTION, POWDER, FOR SOLUTION INTRAVENOUS at 17:08

## 2017-09-14 RX ADMIN — NICOTINE SCH PATCH: 7 PATCH, EXTENDED RELEASE TRANSDERMAL at 07:54

## 2017-09-14 RX ADMIN — LEVALBUTEROL SCH MG: 1.25 SOLUTION, CONCENTRATE RESPIRATORY (INHALATION) at 03:05

## 2017-09-14 RX ADMIN — ENOXAPARIN SODIUM SCH MG: 40 INJECTION SUBCUTANEOUS at 07:55

## 2017-09-14 RX ADMIN — LEVALBUTEROL SCH MG: 1.25 SOLUTION, CONCENTRATE RESPIRATORY (INHALATION) at 15:48

## 2017-09-14 RX ADMIN — METOPROLOL TARTRATE SCH MG: 50 TABLET, FILM COATED ORAL at 16:11

## 2017-09-14 RX ADMIN — HYDROMORPHONE HYDROCHLORIDE PRN MG: 1 INJECTION, SOLUTION INTRAMUSCULAR; INTRAVENOUS; SUBCUTANEOUS at 23:09

## 2017-09-14 RX ADMIN — ACETAMINOPHEN SCH MLS/HR: 10 INJECTION, SOLUTION INTRAVENOUS at 11:23

## 2017-09-14 RX ADMIN — MONTELUKAST SODIUM SCH MG: 10 TABLET, FILM COATED ORAL at 21:20

## 2017-09-14 RX ADMIN — IPRATROPIUM BROMIDE SCH MG: 0.5 SOLUTION RESPIRATORY (INHALATION) at 20:06

## 2017-09-14 RX ADMIN — LEVALBUTEROL SCH MG: 1.25 SOLUTION, CONCENTRATE RESPIRATORY (INHALATION) at 20:06

## 2017-09-14 RX ADMIN — ACETAMINOPHEN SCH MLS/HR: 10 INJECTION, SOLUTION INTRAVENOUS at 04:17

## 2017-09-14 RX ADMIN — IPRATROPIUM BROMIDE SCH MG: 0.5 SOLUTION RESPIRATORY (INHALATION) at 23:28

## 2017-09-14 NOTE — PULMONOLOGY PROGRESS NOTE
Pulmonary Progress Note


Date of Service


Sep 14, 2017.





Attending


Dr. Reis





Subjective


Patient seen and examined. 


She is complaining of pain at chest tube insertion site. She is Day #1 post op 

robotic thoracoscopy. 


She is feeling better today. She denies any fever, chills, cough, hemoptysis.





Objective


VS reviewed, Tmx 36.8, //84, P 59-94, RR 15-18, SaO2 91-98% on 2-

3L. She is currently on RA


Gen: AAOx3, NAD, speaking in full sentences, no respiratory distress


Lungs: good air entry bilaterally, left chest tube in place + air leak present


Abd: soft/NT/ND BS+


Ext:no edema b/l, no cyanosis, no clubbing. 





Labs reviewed.


WBC 14-->16--. today.


Na 126-->134-->133





Blood culture 9/10/2017--No growth to date


Sputum 9/10--no growth


Left upper lobe AFB stain--no growth, AFB culture--pending


Fungal smear--no yeast or hyphae seen, fungal culture-pending


Bacterial culture--no growth to date


Bronchial wash YUSUF--bacterial culture--no growth; fungal smear--no hyphae, 

culture pending; AFB stain and culture--pending





Pathology


Pleura, NOS--pending


Lobe of lung, NOS--pending


Bronchial wash--pending


Medications reviewed.


Imaging viewed by me.





CXR 9/14/2017


Decrease in size in the small left pneumothorax. Left-sided chest tube is


unchanged in position.





CXR 9/11/2017 12 pm


IMPRESSION: 


1. No change in position of the left-sided chest tube. No significant


pneumothorax. Left-sided subcutaneous emphysema


2. Slight improvement in the left basilar airspace opacities





CXR 9/11/2017 8 am


IMPRESSION: 


1. No significant change in the position of the left-sided pleural drain, the


tip of which projects over the aortic arch


2. No pneumothorax identified


3. Persistent left-sided subcutaneous emphysema


4. Persistent left basilar airspace opacities








CXR 9/11/2017


IMPRESSION:


1.  Interval replacement or repositioning of the large bore left pleural drain,


which now terminates appropriately near the left apex. Trace left pneumothorax


persists.


2.  Stable to slightly increased left basilar opacity concerning for pneumonia.





CT chest 9/11/2017


IMPRESSION: 


1. Left-sided chest tube is located entirely within the subcutaneous fat and


soft tissues of the chest wall itself..


2. There is no evidence for extension of any component of tube to the chest


cavity itself.


3. 30% left-sided pneumothorax.


4. Extensive subcutaneous emphysema over the left hemithorax.


5. The chest tube within the soft tissues contains at least 2 kinks


6. This tube should be removed


7. Small left effusion with partial left lower lobe atelectatic change.





Assessment & Plan


Left upper lobe cavitary mass


Left secondary spontaneous pneumothorax


COPD


Tobacco use disorder


Hyponatremia--improved





She is s/p robotic thoroscopy and bronchoscopy with Dr. Hernandez on 9/13/2017.  

I discussed case  Dr. Macias who states that lesion was in the upper lobe and 

left lower lobe was atelectatic. Lesion did not appear to be an abbess but was 

more hemorrhagic and necrotic in nature. Chest tube is in place on the left 

with minimal serosanguinous drainage with still small air leak.


Culture and cytology were sent. BAL done and results are pending for cytology.








Continue with broad spectrum antibiotics. ID has been consulted and is 

following. Blood cultures show no growth to date


Continue with Advair, Singulair and Spiriva for COPD maintenance therapy. She 

is not in acute exacerbation.


Continue with supplemental oxygenation and adequate pain control. Continue with 

incentive spirometry.


Continue with Nicotine patch.





Data


Medications:





Current Inpatient Medications








 Medications


  (Trade)  Dose


 Ordered  Sig/Hermila


 Route  Start Time


 Stop Time Status Last Admin


Dose Admin


 


 Al Hydrox/Mg


 Hydrox/Simethicone


  (Maalox Max Susp)  15 ml  Q4H  PRN


 PO  9/10/17 19:15


 10/10/17 19:14   


 


 


 Magnesium


 Hydroxide


  (Milk Of


 Magnesia Susp)  30 ml  Q12H  PRN


 PO  9/10/17 19:15


 10/10/17 19:14   


 


 


 Zolpidem Tartrate


  (Ambien Tab)  5 mg  HSZ  PRN


 PO  9/10/17 19:15


 10/10/17 19:14   


 


 


 Ondansetron HCl


  (Zofran Inj)  4 mg  Q6H  PRN


 IV  9/10/17 19:15


 10/10/17 19:14   


 


 


 Nitroglycerin


  (Nitrostat Tab)  0.4 mg  UD  PRN


 SL  9/10/17 19:15


 10/10/17 19:14   


 


 


 Polyethylene


  (Miralax Powder


 Packet)  17 gm  DAILY  PRN


 PO  9/10/17 19:15


 10/10/17 19:14   


 


 


 Albuterol


  (Ventolin Hfa


 Inhaler)  2 puffs  Q4  PRN


 INH  9/10/17 19:15


 10/10/17 19:14   


 


 


 Alprazolam


  (Xanax Tab)  0.5 mg  HS  PRN


 PO  9/10/17 19:15


 10/10/17 19:14  9/12/17 22:19


0.5 MG


 


 Atorvastatin


 Calcium


  (Lipitor Tab)  10 mg  HS


 PO  9/10/17 21:00


 10/10/17 20:59  9/13/17 20:32


10 MG


 


 Carisoprodol


  (Soma Tab)  350 mg  TID  PRN


 PO  9/10/17 19:15


 10/10/17 19:14   


 


 


 Docusate Sodium


  (coLACE CAP)  200 mg  DAILY


 PO  9/11/17 09:00


 10/11/17 08:59  9/14/17 07:53


200 MG


 


 Salmeterol


 Xinafoate/


 Fluticasone


  (Advair Diskus


 250/50 Inh)  1 puff  BID


 INH  9/10/17 21:00


 10/10/17 20:59  9/10/17 22:18


1 PUFF


 


 Gabapentin


  (Neurontin Cap)  300 mg  TID


 PO  9/10/17 21:00


 10/10/17 20:59  9/14/17 14:05


300 MG


 


 Acetaminophen/


 Hydrocodone Bitart


  (Norco 7.5/325


 Tab)  1 tab  Q6  PRN


 PO  9/10/17 19:15


 9/24/17 19:14   


 


 


 Lorazepam


  (Ativan Tab)  1 mg  Q8H  PRN


 PO  9/10/17 19:15


 10/10/17 19:14  9/11/17 21:40


1 MG


 


 Losartan Potassium


  (coZAAR TAB)  100 mg  QAM


 PO  9/11/17 09:00


 10/11/17 08:59  9/14/17 07:54


100 MG


 


 Magnesium Oxide


  (Mag-Ox Tab)  400 mg  BID


 PO  9/10/17 21:00


 10/10/17 20:59  9/14/17 07:53


400 MG


 


 Montelukast Sodium


  (Singulair Tab)  10 mg  HS


 PO  9/10/17 21:00


 10/10/17 20:59  9/13/17 20:33


10 MG


 


 Multivitamins


  (Multivitamin


 Tab)  1 tab  QAM


 PO  9/11/17 09:00


 10/11/17 08:59  9/14/17 07:53


1 TAB


 


 Oxycodone HCl


  (Roxicodone


 Immediate Rel Tab)  5 mg  Q4H  PRN


 PO  9/10/17 19:15


 9/24/17 19:14  9/14/17 10:11


5 MG


 


 Ranitidine HCl


  (zANTac TAB)  150 mg  HS


 PO  9/10/17 21:00


 10/10/17 20:59  9/13/17 20:33


150 MG


 


 Tramadol HCl


  (Ultram Tab)  50 mg  TID  PRN


 PO  9/10/17 19:15


 10/10/17 19:14   


 


 


 Calcium/Vitamin D


  (Caltrate Plus


 Tab)  1 tab  QAM


 PO  9/11/17 09:00


 10/11/17 08:59  9/14/17 07:53


1 TAB


 


 Pantoprazole


 Sodium


  (Protonix Tab)  40 mg  DAILY


 PO  9/11/17 09:00


 10/11/17 08:59  9/14/17 07:54


40 MG


 


 Morphine Sulfate


  (MoRPHine


 SULFATE INJ)  4 mg  Q4H  PRN


 IV  9/11/17 00:00


 9/24/17 19:59  9/13/17 18:02


4 MG


 


 Hydromorphone HCl


  (Dilaudid Inj)  0.5 mg  Q4H  PRN


 IV  9/10/17 21:00


 9/24/17 20:59  9/13/17 16:21


0.5 MG


 


 Nicotine


  (Nicoderm Cq


 14MG Patch)  1 patch  QAM


 TD  9/11/17 09:00


 10/11/17 08:59  9/14/17 07:54


1 PATCH


 


 Miscellaneous


  (Remove Nicoderm


 Patch)  1 ea  HS


 N/A  9/11/17 21:00


 10/11/17 20:59  9/13/17 20:31


1 EA


 


 Ipratropium


 Bromide


  (Atrovent 0.02%


 0.5MG/2.5ML Neb)  0.5 mg  Q4R


 INH  9/11/17 00:00


 10/11/17 00:00  9/14/17 15:48


0.5 MG


 


 Levalbuterol


  (Xopenex 1.25MG/


 0.5ML Neb)  1.25 mg  Q4R


 INH  9/11/17 00:00


 10/11/17 00:00  9/14/17 15:48


1.25 MG


 


 Imipenem/


 Cilastatin Sodium


  (Consult)  1 ea  DAILY  PRN


 N/A  9/11/17 06:24


 10/11/17 06:23   


 


 


 Metoprolol


 Tartrate


  (Lopressor Tab)  50 mg  BID@0630,1600


 PO  9/11/17 16:00


 10/11/17 15:59  9/14/17 06:10


50 MG


 


 Imipenem/


 Cilastatin Sodium


 500 mg/Dextrose  110 ml @ 


 110 mls/hr  Q6@0400,1000,1600,2200


 IV  9/11/17 16:00


 9/18/17 15:59  9/14/17 10:11


110 MLS/HR


 


 Budesonide/


 Formoterol


 Fumarate


  (Symbicort 160/


 4.5 Inh)  2 puffs  BID


 INH  9/12/17 21:00


 10/12/17 20:59  9/14/17 07:53


2 PUFFS


 


 Tiotropium Bromide


  (Spiriva


 Respimat)  2 puff  DAILY


 INH  9/13/17 09:00


 10/13/17 08:59  9/14/17 07:52


2 PUFF


 


 Ketorolac


 Tromethamine


  (Toradol Inj)  15 mg  Q6H  PRN


 IV  9/13/17 14:45


 9/18/17 14:44  9/14/17 12:20


15 MG


 


 Enoxaparin Sodium


  (Lovenox Inj)  40 mg  DAILY


 SQ  9/14/17 09:00


 10/14/17 08:59  9/14/17 07:55


40 MG


 


 Metoclopramide HCl


  (Reglan Inj)  10 mg  Q8


 IV.  9/13/17 22:00


 9/14/17 21:59  9/13/17 22:07


10 MG


 


 Morphine Sulfate


  (MoRPHine


 SULFATE INJ)    Q1H  PRN


 IV  9/13/17 14:45


 9/27/17 14:44  9/14/17 04:14


2 MG


 


 Menthol


  (Nice Jeremiah)  1 jeremiah  PRN  PRN


 PO  9/14/17 04:45


 10/14/17 04:44   


 


 


 Acetaminophen


  (Tylenol Tab)  650 mg  Q4H  PRN


 PO  9/14/17 14:15


 10/14/17 14:14   


 








I & O:











24-Hour Column 


 


 9/15/17





 08:00


 


Intake Total 875 ml


 


Output Total 1065 ml


 


Balance -190 ml








Vital Signs:











  Date Time  Temp Pulse Resp B/P (MAP) Pulse Ox O2 Delivery O2 Flow Rate FiO2


 


9/14/17 15:48  84 18  95 Nasal Cannula 2.0 


 


9/14/17 15:15 36.4 64 18 160/80 (106) 94 Room Air  


 


9/14/17 11:43 36.7 79 18 128/69 (88) 95 Nasal Cannula 2.0 


 


9/14/17 11:10  86 18  92 Nasal Cannula 2.0 


 


9/14/17 07:28  70 20  95 Nasal Cannula 2.0 


 


9/14/17 07:05 36.5 71 18 126/77 (93) 97 Nasal Cannula 2.0 


 


9/14/17 06:10  84  106/64 (78)    


 


9/14/17 04:00 36.7 91 20 110/66 (81) 96 Nasal Cannula 2.0 


 


9/14/17 04:00     96 Nasal Cannula 2.0 


 


9/14/17 03:05  74 22  94 Nasal Cannula 2.5 


 


9/14/17 00:05  78 22  96 Nasal Cannula 2.5 


 


9/14/17 00:00      Nasal Cannula 2.0 


 


9/14/17 00:00 36.6 78 20 120/70 (87) 94 Nasal Cannula 2.0 


 


9/13/17 20:00     94 Nasal Cannula 2.0 


 


9/13/17 19:46 36.9 77 18 118/74 (89) 95 Nasal Cannula 2.0 


 


9/13/17 19:28 36.9 81 20 115/68 (84) 96 Nasal Cannula 2.0 


 


9/13/17 19:00  71 22  95 Nasal Cannula 2.5 


 


9/13/17 17:13  75 17 137/81 (99) 94 Nasal Cannula 4.0 


 


9/13/17 16:20  74 16  96 Nasal Cannula 3.0 


 


9/13/17 16:15     94 Nasal Cannula 4.0 


 


9/13/17 16:12 36.6 75 18 138/82 (100) 94 Nasal Cannula 4.0 








Laboratory Results:





Last 24 Hours








Test


  9/14/17


06:35


 


White Blood Count 18.25 K/uL 


 


Red Blood Count 3.30 M/uL 


 


Hemoglobin 10.2 g/dL 


 


Hematocrit 30.3 % 


 


Mean Corpuscular Volume 91.8 fL 


 


Mean Corpuscular Hemoglobin 30.9 pg 


 


Mean Corpuscular Hemoglobin


Concent 33.7 g/dl 


 


 


RDW Standard Deviation 45.9 fL 


 


RDW Coefficient of Variation 13.6 % 


 


Platelet Count 323 K/uL 


 


Mean Platelet Volume 9.3 fL 


 


Sodium Level 133 mmol/L 


 


Potassium Level 4.0 mmol/L 


 


Chloride Level 100 mmol/L 


 


Carbon Dioxide Level 25 mmol/L 


 


Anion Gap 8.0 mmol/L 


 


Blood Urea Nitrogen 8 mg/dl 


 


Creatinine 0.78 mg/dl 


 


Est Creatinine Clear Calc


Drug Dose 70.8 ml/min 


 


 


Estimated GFR (


American) 89.3 


 


 


Estimated GFR (Non-


American 77.0 


 


 


BUN/Creatinine Ratio 10.4 


 


Random Glucose 214 mg/dl 


 


Calcium Level 8.8 mg/dl 


 


Magnesium Level 2.1 mg/dl

## 2017-09-14 NOTE — PROGRESS NOTE
Internal Med Progress Note


Date of Service:


Sep 14, 2017.


Provider Documentation:





SUBJECTIVE:





Seen and examined at bedside 


Doing well this morning


Denies chest pain, SOB


Has some soreness at the chest tube site 


Offers no other complaints








OBJECTIVE:





Vital Signs-as noted below





Physical Exam:


General Appearance:Moderately built and nourished, no apparent distress


Head:  normocephalic, Atraumatic 


Eyes:  normal inspection, EOMI, PERRL


Neck:  supple, Trachea midline


Respiratory/Chest: Decreased breath sounds, CTA, + chest tube


Cardiovascular: S1, S2, No murmur


Abdomen/GI:Soft, Non tender, Bowel sounds present


Extremities/Musculoskelatal:normal inspection, no edema 


Neurologic/Psych:grossly no focal neurological deficits 


Skin:  normal color, warm





Lab data as noted below.


ASSESSMENT & PLAN:


Patient is a 71 yr female with H/O CAD/CABG, HTN, COPD presenting with left 

sided chest pain.








LEFT SIDED PNEUMOTHORAX


Left lung mass S/P robot-assisted left thoracoscopy with excision of a mass POD 

# 1  


Continue Chest tube per CT surgery 


CT chest showed 30% left-sided pneumothorax, extensive subcutaneous emphysema 

over the left hemithorax and small L effusion with partial LLL atelectasis


Appreciate Pulmonology/CT surgery help 


Continue Primaxin


Leukocytosis persistent   


Appreciate ID Input


Blood/Sputum culture negative 


Bronchial cultures: rare yeast  


Lung mass pathology pending  








COPD


H/O chronic tobacco use 


Continue home inhalers   


pulmonology and CT surgery following 


counselled for smoking cessation 


Nicotine patch








CAD/CABG


Held ASA, Plavix for thoracoscopic eval 


continue Losartan, Metoprolol


hold Lasix for now 


Plan to resume ASA/Plavix when appropriate








Hyponatremia/Hypokalemia:


Replace and monitor 








HTN


continue Losartan, Metoprolol








DVT PX


SCDs for now 








Code Status


FULL CODE 





DISPOSITION


anticipate d/c home when medically stable


will need PT/OT eval prior to discharge 


follows with Family practice at [place] area 


would like to follow up with Pulmonology Dr Frazier in [place]


Vital Signs:











  Date Time  Temp Pulse Resp B/P (MAP) Pulse Ox O2 Delivery O2 Flow Rate FiO2


 


9/14/17 07:28  70 20  95 Nasal Cannula 2.0 


 


9/14/17 07:05 36.5 71 18 126/77 (93) 97 Nasal Cannula 2.0 


 


9/14/17 06:10  84  106/64 (78)    


 


9/14/17 04:00 36.7 91 20 110/66 (81) 96 Nasal Cannula 2.0 


 


9/14/17 04:00     96 Nasal Cannula 2.0 


 


9/14/17 03:05  74 22  94 Nasal Cannula 2.5 


 


9/14/17 00:05  78 22  96 Nasal Cannula 2.5 


 


9/14/17 00:00      Nasal Cannula 2.0 


 


9/14/17 00:00 36.6 78 20 120/70 (87) 94 Nasal Cannula 2.0 


 


9/13/17 20:00     94 Nasal Cannula 2.0 


 


9/13/17 19:46 36.9 77 18 118/74 (89) 95 Nasal Cannula 2.0 


 


9/13/17 19:28 36.9 81 20 115/68 (84) 96 Nasal Cannula 2.0 


 


9/13/17 19:00  71 22  95 Nasal Cannula 2.5 


 


9/13/17 17:13  75 17 137/81 (99) 94 Nasal Cannula 4.0 


 


9/13/17 16:20  74 16  96 Nasal Cannula 3.0 


 


9/13/17 16:15     94 Nasal Cannula 4.0 


 


9/13/17 16:12 36.6 75 18 138/82 (100) 94 Nasal Cannula 4.0 


 


9/13/17 15:40 36.8 74 16 130/64 97 Oxymask 10 


 


9/13/17 15:30  69 16 130/63 97 Oxymask 10 


 


9/13/17 15:20 36.0 75 16 126/70 100 Oxymask 10 


 


9/13/17 15:10  76 16 102/64 100 Oxymask 10 


 


9/13/17 15:01 35.5 94 16 107/72 100 Oxymask 10 








Lab Results:





Results Past 24 Hours








Test


  9/14/17


06:35 Range/Units


 


 


White Blood Count 18.25 4.8-10.8  K/uL


 


Red Blood Count 3.30 4.2-5.4  M/uL


 


Hemoglobin 10.2 12.0-16.0  g/dL


 


Hematocrit 30.3 37-47  %


 


Mean Corpuscular Volume 91.8   fL


 


Mean Corpuscular Hemoglobin 30.9 25-34  pg


 


Mean Corpuscular Hemoglobin


Concent 33.7


  32-36  g/dl


 


 


RDW Standard Deviation 45.9 36.4-46.3  fL


 


RDW Coefficient of Variation 13.6 11.5-14.5  %


 


Platelet Count 323 130-400  K/uL


 


Mean Platelet Volume 9.3 7.4-10.4  fL


 


Sodium Level 133 136-145  mmol/L


 


Potassium Level 4.0 3.5-5.1  mmol/L


 


Chloride Level 100   mmol/L


 


Carbon Dioxide Level 25 21-32  mmol/L


 


Anion Gap 8.0 3-11  mmol/L


 


Blood Urea Nitrogen 8 7-18  mg/dl


 


Creatinine


  0.78


  0.60-1.20


mg/dl


 


Est Creatinine Clear Calc


Drug Dose 70.8


   ml/min


 


 


Estimated GFR (


American) 89.3


   


 


 


Estimated GFR (Non-


American 77.0


   


 


 


BUN/Creatinine Ratio 10.4 10-20  


 


Random Glucose 214 70-99  mg/dl


 


Calcium Level 8.8 8.5-10.1  mg/dl


 


Magnesium Level 2.1 1.8-2.4  mg/dl








Microbiology Results


9/13/17 Acid Fast Stain - Final, Resulted


          


9/13/17 Mycobacterial Culture, Resulted


          Pending


9/13/17 Fungal Smear - Final, Resulted


          


9/13/17 Fungal Culture, Resulted


          Pending


9/13/17 Gram Stain - Final, Resulted


          


9/13/17 Wound Culture, Resulted


          Pending


9/13/17 Gram Stain - Final, Resulted


          


9/13/17 Wound Culture, Resulted


          Pending

## 2017-09-14 NOTE — ANESTHESIOLOGY PROGRESS NOTE
Anesthesia Post Op Note


Date & Time


Sep 14, 2017 at 10:56





Vital Signs


Pain Intensity:  5.0





Vital Signs Past 12 Hours








  Date Time  Temp Pulse Resp B/P (MAP) Pulse Ox O2 Delivery O2 Flow Rate FiO2


 


9/14/17 07:28  70 20  95 Nasal Cannula 2.0 


 


9/14/17 07:05 36.5 71 18 126/77 (93) 97 Nasal Cannula 2.0 


 


9/14/17 06:10  84  106/64 (78)    


 


9/14/17 04:00 36.7 91 20 110/66 (81) 96 Nasal Cannula 2.0 


 


9/14/17 04:00     96 Nasal Cannula 2.0 


 


9/14/17 03:05  74 22  94 Nasal Cannula 2.5 


 


9/14/17 00:05  78 22  96 Nasal Cannula 2.5 


 


9/14/17 00:00      Nasal Cannula 2.0 


 


9/14/17 00:00 36.6 78 20 120/70 (87) 94 Nasal Cannula 2.0 











Notes


Mental Status:  alert / awake / arousable, participated in evaluation


Pt Amnestic to Procedure:  Yes


Nausea / Vomiting:  adequately controlled


Pain:  adequately controlled


Airway Patency, RR, SpO2:  stable & adequate


BP & HR:  stable & adequate


Hydration State:  stable & adequate


Anesthetic Complications:  no major complications apparent

## 2017-09-14 NOTE — SURGERY PROGRESS NOTE
DATE: 09/14/2017

 

Ms. Gonzalez is seen today on 09/14/2017, one day status post robotic-assisted

left thoracoscopy with excision of apparent parenchymal abscess versus

hematoma.  The patient has a very small air leak.  I think her x-ray looks

very good.  She does have subcutaneous emphysema which is not surprising.  We

used carbon dioxide insufflation, and she has already had 2 chest tubes

placed, so I would not be surprised to see subcutaneous emphysema.  It

appears less than it was on yesterday's film.  I think she sounds good.  She

is sitting up in the chair eating breakfast.  Her pain is much better than it

was before the surgery.  She is on 2 liters nasal cannula running anywhere

from 95%-97% saturation.  She is not tachypneic.  Her vital signs are stable.

 She has put out very little through the chest tube.  Less than 100 mL since

surgery.  She has been voiding well.  Her lungs sound good, although she has

subcutaneous emphysema.  Chest tube has a tiny air leak.  Her white count is

18,250 with a hemoglobin of 10.2.  The platelet count is stable.  Her sodium

is 133, potassium is 4.0, chloride 100, bicarbonate 25, BUN and creatinine of

8 and 0.78 respectively.  The bronchial washings show rare yeast.

 

ASSESSMENT AND PLAN:  Postoperative day #1 status post robot-assisted left

thoracoscopy with excision of a mass.  Frozen section shows necrotic lung. 

This did not look like an abscess.  We drained very little fluid, and there

was very little fluid in her chest at the time of surgery.  We will

discontinue her monitor and her IV fluids and get her up and moving around. 

I have explained how important it is for her to walk.  She looks much better

to me today.

## 2017-09-14 NOTE — PROGRESS NOTE
Subjective


Date of Service:


Sep 14, 2017.


Subjective


Pt evaluation today including:  conversation w/ patient, conversation w/ family

, physical exam, chart review, lab review


pt seen in followup. S/p biopsy yesterday, tolerated well. having pain at chest 

tube site, asking for increased pain meds. remains on Imipenem, tolerating 

well. afebrile. all cultures negative. no abd pain, no n/v/d. still with min 

bloody fluid from chest, multiple cultures obtained, negative so far. afb and 

fungal smear negative. No cp. no sob, no cough. All remaining ros reviewed and 

are negative.





Objective


Vital Signs











  Date Time  Temp Pulse Resp B/P (MAP) Pulse Ox O2 Delivery O2 Flow Rate FiO2


 


9/14/17 11:43 36.7 79 18 128/69 (88) 95 Nasal Cannula 2.0 


 


9/14/17 11:10  86 18  92 Nasal Cannula 2.0 


 


9/14/17 07:28  70 20  95 Nasal Cannula 2.0 


 


9/14/17 07:05 36.5 71 18 126/77 (93) 97 Nasal Cannula 2.0 


 


9/14/17 06:10  84  106/64 (78)    


 


9/14/17 04:00 36.7 91 20 110/66 (81) 96 Nasal Cannula 2.0 


 


9/14/17 04:00     96 Nasal Cannula 2.0 


 


9/14/17 03:05  74 22  94 Nasal Cannula 2.5 


 


9/14/17 00:05  78 22  96 Nasal Cannula 2.5 


 


9/14/17 00:00      Nasal Cannula 2.0 


 


9/14/17 00:00 36.6 78 20 120/70 (87) 94 Nasal Cannula 2.0 


 


9/13/17 20:00     94 Nasal Cannula 2.0 


 


9/13/17 19:46 36.9 77 18 118/74 (89) 95 Nasal Cannula 2.0 


 


9/13/17 19:28 36.9 81 20 115/68 (84) 96 Nasal Cannula 2.0 


 


9/13/17 19:00  71 22  95 Nasal Cannula 2.5 


 


9/13/17 17:13  75 17 137/81 (99) 94 Nasal Cannula 4.0 


 


9/13/17 16:20  74 16  96 Nasal Cannula 3.0 


 


9/13/17 16:15     94 Nasal Cannula 4.0 


 


9/13/17 16:12 36.6 75 18 138/82 (100) 94 Nasal Cannula 4.0 


 


9/13/17 15:40 36.8 74 16 130/64 97 Oxymask 10 


 


9/13/17 15:30  69 16 130/63 97 Oxymask 10 


 


9/13/17 15:20 36.0 75 16 126/70 100 Oxymask 10 


 


9/13/17 15:10  76 16 102/64 100 Oxymask 10 


 


9/13/17 15:01 35.5 94 16 107/72 100 Oxymask 10 











Physical Exam


General Appearance:  WD/WN, no apparent distress


Eyes:  normal inspection, EOMI


Neck:  supple


Respiratory/Chest:  lungs clear, normal breath sounds, no respiratory distress, 

+ decreased breath sounds


Cardiovascular:  regular rate, rhythm, no edema


Abdomen:  non tender, soft


Extremities:  non-tender, normal inspection, no pedal edema


Neurologic/Psychiatric:  alert, oriented x 3


Skin:  normal color, warm/dry, no rash





Laboratory Results











Item Value  Date Time


 


Blood Culture - Preliminary Resulted 9/10/17 1931





Blood NO GROWTH TO DATE. 


 


Blood Culture - Preliminary Resulted 9/10/17 1937





Blood NO GROWTH TO DATE. 


 


Gram Stain - Final Complete 9/10/17 2300





Sputum Expectorated Sputum  











Last 24 Hours








Test


  9/14/17


06:35


 


White Blood Count 18.25 K/uL 


 


Red Blood Count 3.30 M/uL 


 


Hemoglobin 10.2 g/dL 


 


Hematocrit 30.3 % 


 


Mean Corpuscular Volume 91.8 fL 


 


Mean Corpuscular Hemoglobin 30.9 pg 


 


Mean Corpuscular Hemoglobin


Concent 33.7 g/dl 


 


 


RDW Standard Deviation 45.9 fL 


 


RDW Coefficient of Variation 13.6 % 


 


Platelet Count 323 K/uL 


 


Mean Platelet Volume 9.3 fL 


 


Sodium Level 133 mmol/L 


 


Potassium Level 4.0 mmol/L 


 


Chloride Level 100 mmol/L 


 


Carbon Dioxide Level 25 mmol/L 


 


Anion Gap 8.0 mmol/L 


 


Blood Urea Nitrogen 8 mg/dl 


 


Creatinine 0.78 mg/dl 


 


Est Creatinine Clear Calc


Drug Dose 70.8 ml/min 


 


 


Estimated GFR (


American) 89.3 


 


 


Estimated GFR (Non-


American 77.0 


 


 


BUN/Creatinine Ratio 10.4 


 


Random Glucose 214 mg/dl 


 


Calcium Level 8.8 mg/dl 


 


Magnesium Level 2.1 mg/dl 











Assessment and Plan





(1) Pleural effusion


Assessment & Plan:  ? infectious vs malignant. cultures negative so far, will 

continue abx pending biopsy findings. will follow





(2) Pneumothorax

## 2017-09-14 NOTE — DIAGNOSTIC IMAGING REPORT
CHEST ONE VIEW PORTABLE



HISTORY:      s/p left lung wedge resection 



COMPARISON: Chest 9/13/2017.



FINDINGS: Left-sided chest tube remains unchanged in position. Small left

pneumothorax has decreased in size. Left chest wall subcutaneous emphysema

persists. There are poststernotomy changes. Bibasilar densities are nonspecific

but favor subsegmental atelectasis. The heart is stable in size.



IMPRESSION:

Decrease in size in the small left pneumothorax. Left-sided chest tube is

unchanged in position.







Electronically signed by:  Tremayne Briceño M.D.

9/14/2017 8:07 AM



Dictated Date/Time:  9/14/2017 8:06 AM

## 2017-09-15 VITALS — OXYGEN SATURATION: 94 % | HEART RATE: 82 BPM

## 2017-09-15 VITALS — SYSTOLIC BLOOD PRESSURE: 125 MMHG | HEART RATE: 103 BPM | DIASTOLIC BLOOD PRESSURE: 68 MMHG

## 2017-09-15 VITALS
DIASTOLIC BLOOD PRESSURE: 73 MMHG | SYSTOLIC BLOOD PRESSURE: 128 MMHG | TEMPERATURE: 99.32 F | OXYGEN SATURATION: 92 % | HEART RATE: 96 BPM

## 2017-09-15 VITALS
DIASTOLIC BLOOD PRESSURE: 78 MMHG | SYSTOLIC BLOOD PRESSURE: 147 MMHG | OXYGEN SATURATION: 93 % | HEART RATE: 89 BPM | TEMPERATURE: 98.06 F

## 2017-09-15 VITALS
OXYGEN SATURATION: 92 % | SYSTOLIC BLOOD PRESSURE: 136 MMHG | DIASTOLIC BLOOD PRESSURE: 83 MMHG | HEART RATE: 100 BPM | TEMPERATURE: 98.78 F

## 2017-09-15 VITALS
HEART RATE: 80 BPM | SYSTOLIC BLOOD PRESSURE: 126 MMHG | DIASTOLIC BLOOD PRESSURE: 77 MMHG | TEMPERATURE: 97.34 F | OXYGEN SATURATION: 88 %

## 2017-09-15 VITALS — OXYGEN SATURATION: 92 % | HEART RATE: 98 BPM

## 2017-09-15 VITALS — HEART RATE: 80 BPM | OXYGEN SATURATION: 87 %

## 2017-09-15 VITALS — HEART RATE: 100 BPM | OXYGEN SATURATION: 92 %

## 2017-09-15 LAB
ANION GAP SERPL CALC-SCNC: 5 MMOL/L (ref 3–11)
BUN SERPL-MCNC: 9 MG/DL (ref 7–18)
BUN/CREAT SERPL: 11.3 (ref 10–20)
CALCIUM SERPL-MCNC: 9.2 MG/DL (ref 8.5–10.1)
CHLORIDE SERPL-SCNC: 100 MMOL/L (ref 98–107)
CO2 SERPL-SCNC: 28 MMOL/L (ref 21–32)
CREAT CL PREDICTED SERPL C-G-VRATE: 69.9 ML/MIN
CREAT SERPL-MCNC: 0.79 MG/DL (ref 0.6–1.2)
EOSINOPHIL NFR BLD AUTO: 409 K/UL (ref 130–400)
GLUCOSE SERPL-MCNC: 99 MG/DL (ref 70–99)
HCT VFR BLD CALC: 32.1 % (ref 37–47)
MAGNESIUM SERPL-MCNC: 2 MG/DL (ref 1.8–2.4)
MCH RBC QN AUTO: 30.8 PG (ref 25–34)
MCHC RBC AUTO-ENTMCNC: 33.6 G/DL (ref 32–36)
MCV RBC AUTO: 91.5 FL (ref 80–100)
PMV BLD AUTO: 9.1 FL (ref 7.4–10.4)
POTASSIUM SERPL-SCNC: 4.3 MMOL/L (ref 3.5–5.1)
RBC # BLD AUTO: 3.51 M/UL (ref 4.2–5.4)
SODIUM SERPL-SCNC: 133 MMOL/L (ref 136–145)
WBC # BLD AUTO: 15.32 K/UL (ref 4.8–10.8)

## 2017-09-15 RX ADMIN — OXYCODONE HYDROCHLORIDE PRN MG: 5 TABLET ORAL at 09:45

## 2017-09-15 RX ADMIN — IMIPENEM AND CILASTATIN SODIUM SCH MLS/HR: 500; 500 INJECTION, POWDER, FOR SOLUTION INTRAVENOUS at 21:22

## 2017-09-15 RX ADMIN — IPRATROPIUM BROMIDE SCH MG: 0.5 SOLUTION RESPIRATORY (INHALATION) at 07:19

## 2017-09-15 RX ADMIN — IPRATROPIUM BROMIDE SCH MG: 0.5 SOLUTION RESPIRATORY (INHALATION) at 11:48

## 2017-09-15 RX ADMIN — METOPROLOL TARTRATE SCH MG: 50 TABLET, FILM COATED ORAL at 15:51

## 2017-09-15 RX ADMIN — PANTOPRAZOLE SCH MG: 40 TABLET, DELAYED RELEASE ORAL at 07:01

## 2017-09-15 RX ADMIN — OXYCODONE HYDROCHLORIDE PRN MG: 5 TABLET ORAL at 05:51

## 2017-09-15 RX ADMIN — IPRATROPIUM BROMIDE SCH MG: 0.5 SOLUTION RESPIRATORY (INHALATION) at 15:27

## 2017-09-15 RX ADMIN — NICOTINE SCH PATCH: 7 PATCH, EXTENDED RELEASE TRANSDERMAL at 07:04

## 2017-09-15 RX ADMIN — IMIPENEM AND CILASTATIN SODIUM SCH MLS/HR: 500; 500 INJECTION, POWDER, FOR SOLUTION INTRAVENOUS at 15:51

## 2017-09-15 RX ADMIN — BUDESONIDE AND FORMOTEROL FUMARATE DIHYDRATE SCH PUFFS: 160; 4.5 AEROSOL RESPIRATORY (INHALATION) at 07:01

## 2017-09-15 RX ADMIN — IMIPENEM AND CILASTATIN SODIUM SCH MLS/HR: 500; 500 INJECTION, POWDER, FOR SOLUTION INTRAVENOUS at 09:45

## 2017-09-15 RX ADMIN — IPRATROPIUM BROMIDE SCH MG: 0.5 SOLUTION RESPIRATORY (INHALATION) at 23:45

## 2017-09-15 RX ADMIN — Medication SCH TAB: at 07:03

## 2017-09-15 RX ADMIN — OXYCODONE HYDROCHLORIDE PRN MG: 5 TABLET ORAL at 21:16

## 2017-09-15 RX ADMIN — BUDESONIDE AND FORMOTEROL FUMARATE DIHYDRATE SCH PUFFS: 160; 4.5 AEROSOL RESPIRATORY (INHALATION) at 18:27

## 2017-09-15 RX ADMIN — IMIPENEM AND CILASTATIN SODIUM SCH MLS/HR: 500; 500 INJECTION, POWDER, FOR SOLUTION INTRAVENOUS at 04:03

## 2017-09-15 RX ADMIN — IPRATROPIUM BROMIDE SCH MG: 0.5 SOLUTION RESPIRATORY (INHALATION) at 19:32

## 2017-09-15 RX ADMIN — ENOXAPARIN SODIUM SCH MG: 40 INJECTION SUBCUTANEOUS at 07:04

## 2017-09-15 RX ADMIN — Medication SCH MG: at 21:17

## 2017-09-15 RX ADMIN — LEVALBUTEROL SCH MG: 1.25 SOLUTION, CONCENTRATE RESPIRATORY (INHALATION) at 15:28

## 2017-09-15 RX ADMIN — METOPROLOL TARTRATE SCH MG: 50 TABLET, FILM COATED ORAL at 05:49

## 2017-09-15 RX ADMIN — OXYCODONE HYDROCHLORIDE PRN MG: 5 TABLET ORAL at 13:45

## 2017-09-15 RX ADMIN — GABAPENTIN SCH MG: 300 CAPSULE ORAL at 07:02

## 2017-09-15 RX ADMIN — DOCUSATE SODIUM SCH MG: 100 CAPSULE, LIQUID FILLED ORAL at 07:02

## 2017-09-15 RX ADMIN — LEVALBUTEROL SCH MG: 1.25 SOLUTION, CONCENTRATE RESPIRATORY (INHALATION) at 07:19

## 2017-09-15 RX ADMIN — KETOROLAC TROMETHAMINE PRN MG: 15 INJECTION INTRAMUSCULAR; INTRAVENOUS at 23:36

## 2017-09-15 RX ADMIN — FLUTICASONE PROPIONATE AND SALMETEROL SCH PUFF: 50; 250 POWDER RESPIRATORY (INHALATION) at 07:06

## 2017-09-15 RX ADMIN — LEVALBUTEROL SCH MG: 1.25 SOLUTION, CONCENTRATE RESPIRATORY (INHALATION) at 19:32

## 2017-09-15 RX ADMIN — LEVALBUTEROL SCH MG: 1.25 SOLUTION, CONCENTRATE RESPIRATORY (INHALATION) at 11:48

## 2017-09-15 RX ADMIN — GABAPENTIN SCH MG: 300 CAPSULE ORAL at 13:43

## 2017-09-15 RX ADMIN — ATORVASTATIN CALCIUM SCH MG: 10 TABLET, FILM COATED ORAL at 21:17

## 2017-09-15 RX ADMIN — Medication SCH MG: at 07:03

## 2017-09-15 RX ADMIN — FLUTICASONE PROPIONATE AND SALMETEROL SCH PUFF: 50; 250 POWDER RESPIRATORY (INHALATION) at 21:00

## 2017-09-15 RX ADMIN — GABAPENTIN SCH MG: 300 CAPSULE ORAL at 21:18

## 2017-09-15 RX ADMIN — LEVALBUTEROL SCH MG: 1.25 SOLUTION, CONCENTRATE RESPIRATORY (INHALATION) at 23:45

## 2017-09-15 RX ADMIN — LOSARTAN POTASSIUM SCH MG: 50 TABLET, FILM COATED ORAL at 07:02

## 2017-09-15 RX ADMIN — HYDROMORPHONE HYDROCHLORIDE PRN MG: 1 INJECTION, SOLUTION INTRAMUSCULAR; INTRAVENOUS; SUBCUTANEOUS at 21:54

## 2017-09-15 RX ADMIN — RANITIDINE SCH MG: 150 TABLET ORAL at 21:18

## 2017-09-15 RX ADMIN — MONTELUKAST SODIUM SCH MG: 10 TABLET, FILM COATED ORAL at 21:18

## 2017-09-15 NOTE — PULMONARY FUNCTION TEST
Readings of based off ATS criteria.

 

SPIROMETRY:  Severe obstructive ventilatory disease.

 

BRONCHODILATOR:  No significant response.

 

INTERPRETATION:  Severe obstructive ventilatory disease.

## 2017-09-15 NOTE — PROGRESS NOTE
Subjective


Date of Service:


Sep 15, 2017.


Subjective


pt sitting in bed, no distress, s/p biospy, all cultures pending but negative 

to date. blood cultures also negative. no f/c tolerating imipenem. afb smears 

fungal smear negative. still with leukocytosis, but improving, suspect 

increased reactive due to surgery. No overnight events.





Objective


Vital Signs











  Date Time  Temp Pulse Resp B/P (MAP) Pulse Ox O2 Delivery O2 Flow Rate FiO2


 


9/15/17 07:22 36.3 80 19 126/77 (93) 88 Nebulizer  


 


9/15/17 07:19  80 18  87 Room Air  


 


9/15/17 05:47  103  125/68 (87)    


 


9/15/17 00:00      Room Air  


 


9/14/17 23:28  88 18  91 Room Air  


 


9/14/17 23:00 36.7 88 19 115/69 (84) 93 Room Air  


 


9/14/17 20:14  83 18  95 Room Air  90


 


9/14/17 16:09  93   96 Room Air  


 


9/14/17 15:48  84 18  95 Nasal Cannula 2.0 


 


9/14/17 15:45     94 Room Air  


 


9/14/17 15:15 36.4 64 18 160/80 (106) 94 Room Air  


 


9/14/17 11:43 36.7 79 18 128/69 (88) 95 Nasal Cannula 2.0 


 


9/14/17 11:10  86 18  92 Nasal Cannula 2.0 











Physical Exam


General Appearance:  WD/WN, no apparent distress


Eyes:  normal inspection


Respiratory/Chest:  normal breath sounds, no respiratory distress


Cardiovascular:  no edema


Extremities:  no pedal edema


Neurologic/Psychiatric:  alert, oriented x 3


Skin:  normal color


Comments:


chest tube in place





Laboratory Results











Item Value  Date Time


 


Blood Culture - Preliminary Resulted 9/10/17 1931





Blood NO GROWTH TO DATE. 


 


Blood Culture - Preliminary Resulted 9/10/17 1937





Blood NO GROWTH TO DATE. 


 


Gram Stain - Final Complete 9/10/17 2300





Sputum Expectorated Sputum  


 


Gram Stain - Final Resulted 9/13/17 0000





Bronchial Washings Left Upper Lobe  


 


Gram Stain - Final Resulted 9/13/17 1245





Abscess Lung, Left  


 


Acid Fast Stain - Final Resulted 9/13/17 1323





Tissue Left Upper Lobe  


 


Gram Stain - Final Resulted 9/13/17 1323





Tissue Left Upper Lobe  


 


Gram Stain - Final Resulted 9/13/17 1323





Tissue Left Upper Lobe  


 


Gram Stain - Final Resulted 9/13/17 1245





Abscess Lung, Left  


 


Gram Stain - Final Complete 9/13/17 0000





Bronchial Washings Left Upper Lobe  


 


Acid Fast Stain - Final Resulted 9/13/17 0000





Bronchial Washings Left Upper Lobe  











Last 24 Hours








Test


  9/15/17


06:58


 


White Blood Count 15.32 K/uL 


 


Red Blood Count 3.51 M/uL 


 


Hemoglobin 10.8 g/dL 


 


Hematocrit 32.1 % 


 


Mean Corpuscular Volume 91.5 fL 


 


Mean Corpuscular Hemoglobin 30.8 pg 


 


Mean Corpuscular Hemoglobin


Concent 33.6 g/dl 


 


 


RDW Standard Deviation 45.8 fL 


 


RDW Coefficient of Variation 13.7 % 


 


Platelet Count 409 K/uL 


 


Mean Platelet Volume 9.1 fL 


 


Sodium Level 133 mmol/L 


 


Potassium Level 4.3 mmol/L 


 


Chloride Level 100 mmol/L 


 


Carbon Dioxide Level 28 mmol/L 


 


Anion Gap 5.0 mmol/L 


 


Blood Urea Nitrogen 9 mg/dl 


 


Creatinine 0.79 mg/dl 


 


Est Creatinine Clear Calc


Drug Dose 69.9 ml/min 


 


 


Estimated GFR (


American) 87.9 


 


 


Estimated GFR (Non-


American 75.8 


 


 


BUN/Creatinine Ratio 11.3 


 


Random Glucose 99 mg/dl 


 


Calcium Level 9.2 mg/dl 


 


Magnesium Level 2.0 mg/dl 


 


Chemistry Specimen Hemolysis  











Assessment and Plan





(1) Pleural effusion


Assessment & Plan:  ? infectious vs malignant. cultures negative so far, will 

continue abx pending biopsy findings. will follow. If cultures remain negative (

was on abx at time of OR and also had azithro as outpt pta) she could be 

transitioned to po augmentin x 21 days. 





(2) Pneumothorax

## 2017-09-15 NOTE — PULMONOLOGY PROGRESS NOTE
Pulmonary Progress Note


Date of Service


Sep 15, 2017.





Attending








Subjective


Patient seen and examined. She states that she is feeling better.


Her  is visiting. She is in good spirits today.


She is still having some intermittent chest pain with deep inspiration at chest 

tube insertion site.


Pain is adequately controlled with pain meds.


She denies any cough or further shortness of breath.





Objective


VS reviewed, Tmx 36.7, //78, P 87-93, RR 17-19, SaO2 87-93% She is 

currently on RA


Gen: AAOx3, NAD, speaking in full sentences, no respiratory distress


Lungs: good air entry bilaterally, left chest tube in place + air leak present, 

she does have sporadic wheezing a left lower base


Abd: soft/NT/ND BS+


Ext:no edema b/l, no cyanosis, no clubbing. 





Labs reviewed.


Na 133, Urine, Osm 90








spirometry 9/10/2017: sever obstructive ventilatory disease with no significant 

bronchodilator respsone


Blood culture 9/10/2017--No growth to date


Sputum 9/10--no growth





Left upper lobe AFB stain--no growth, AFB culture--pending, bacterial culture--

no growth to date


Fungal smear--no yeast or hyphae seen, fungal culture-pending


Bacterial culture--no growth to date


Bronchial wash YUSUF--bacterial culture--no growth; fungal smear--rare yeast seen

, culture pending; AFB stain--no AFB seen and culture--pending





Pathology


Pleura, NOS--9/13/2017


1.  SEVERE CHRONIC ACTIVE PLEURITIS TO INCLUDE MANY HISTIOCYTES IS SEEN.


2.  NO TUMOR SEEN.


3.  THE CLINICAL HISTORY OF A LEFT LUNG ABSCESS IS NOTED. 





Lobe of lung, NOS--


FINAL DIAGNOSIS


A.  LUNG, DISTAL LINGULA LEFT LOBE, WEDGE EXCISION:  ACUTE PNEUMONITIS AND 

PLEURITIS.  VASCULAR CONGESTION, ALVEOLAR HEMORRHAGE AND EDEMA.  SEE COMMENT.  


B.  LUNG, LEFT UPPER LOBE, MEDIAL SEGMENT, WEDGE EXCISION:  DIFFUSE HEMORRHAGIC 

NECROSIS.  ACUTE PNEUMONITIS AND PLEURITIS.  SEE COMMENT.  


Bronchial wash--pending


Medications reviewed.


Imaging viewed by me.





CXR 9/14/2017


Decrease in size in the small left pneumothorax. Left-sided chest tube is


unchanged in position.





CXR 9/11/2017 12 pm


IMPRESSION: 


1. No change in position of the left-sided chest tube. No significant


pneumothorax. Left-sided subcutaneous emphysema


2. Slight improvement in the left basilar airspace opacities





CXR 9/11/2017 8 am


IMPRESSION: 


1. No significant change in the position of the left-sided pleural drain, the


tip of which projects over the aortic arch


2. No pneumothorax identified


3. Persistent left-sided subcutaneous emphysema


4. Persistent left basilar airspace opacities








CXR 9/11/2017


IMPRESSION:


1.  Interval replacement or repositioning of the large bore left pleural drain,


which now terminates appropriately near the left apex. Trace left pneumothorax


persists.


2.  Stable to slightly increased left basilar opacity concerning for pneumonia.





CT chest 9/11/2017


IMPRESSION: 


1. Left-sided chest tube is located entirely within the subcutaneous fat and


soft tissues of the chest wall itself..


2. There is no evidence for extension of any component of tube to the chest


cavity itself.


3. 30% left-sided pneumothorax.


4. Extensive subcutaneous emphysema over the left hemithorax.


5. The chest tube within the soft tissues contains at least 2 kinks


6. This tube should be removed


7. Small left effusion with partial left lower lobe atelectatic change.





Assessment & Plan


Left upper lobe cavitary mass


Left secondary spontaneous pneumothorax


COPD


Tobacco use disorder


Hyponatremia--improved





She is s/p robotic thoroscopy and bronchoscopy with Dr. Hernandez on 9/13/2017.  

I discussed case  Dr. Macias who states that lesion was in the upper lobe and 

left lower lobe was atelectatic. Lesion did not appear to be an abbess but was 

more hemorrhagic and necrotic in nature. Chest tube is in place on the left 

with minimal serosanguinous drainage with still small air leak. Pathology is 

negative for malignancy. Bacterial cultures are negative and rare yeast growing 

in bronchial wash.


BAL done and results are pending for cytology.








Continue with broad spectrum antibiotics. ID has been consulted and is 

following. Blood cultures show no growth to date


Continue with Advair, Singulair and Spiriva for COPD maintenance therapy. She 

is not in acute exacerbation.


Continue with supplemental oxygenation and adequate pain control. Continue with 

incentive spirometry.


Continue with chest tube per CT surgery. She still has a small air leak.


Continue with Nicotine patch. 





Patient is doing much better from a respiratory standpoint and is clinically 

stable on room air.


At this time I will sign off case. Please feel free to contact me if you have 

any further questions or concerns.


She can follow with Dr. Frazier upon discharge.





Data


Medications:





Current Inpatient Medications








 Medications


  (Trade)  Dose


 Ordered  Sig/Hermila


 Route  Start Time


 Stop Time Status Last Admin


Dose Admin


 


 Al Hydrox/Mg


 Hydrox/Simethicone


  (Maalox Max Susp)  15 ml  Q4H  PRN


 PO  9/10/17 19:15


 10/10/17 19:14   


 


 


 Magnesium


 Hydroxide


  (Milk Of


 Magnesia Susp)  30 ml  Q12H  PRN


 PO  9/10/17 19:15


 10/10/17 19:14   


 


 


 Zolpidem Tartrate


  (Ambien Tab)  5 mg  HSZ  PRN


 PO  9/10/17 19:15


 10/10/17 19:14   


 


 


 Ondansetron HCl


  (Zofran Inj)  4 mg  Q6H  PRN


 IV  9/10/17 19:15


 10/10/17 19:14   


 


 


 Nitroglycerin


  (Nitrostat Tab)  0.4 mg  UD  PRN


 SL  9/10/17 19:15


 10/10/17 19:14   


 


 


 Polyethylene


  (Miralax Powder


 Packet)  17 gm  DAILY  PRN


 PO  9/10/17 19:15


 10/10/17 19:14   


 


 


 Albuterol


  (Ventolin Hfa


 Inhaler)  2 puffs  Q4  PRN


 INH  9/10/17 19:15


 10/10/17 19:14   


 


 


 Alprazolam


  (Xanax Tab)  0.5 mg  HS  PRN


 PO  9/10/17 19:15


 10/10/17 19:14  9/12/17 22:19


0.5 MG


 


 Atorvastatin


 Calcium


  (Lipitor Tab)  10 mg  HS


 PO  9/10/17 21:00


 10/10/17 20:59  9/14/17 21:19


10 MG


 


 Carisoprodol


  (Soma Tab)  350 mg  TID  PRN


 PO  9/10/17 19:15


 10/10/17 19:14   


 


 


 Docusate Sodium


  (coLACE CAP)  200 mg  DAILY


 PO  9/11/17 09:00


 10/11/17 08:59  9/15/17 07:02


200 MG


 


 Salmeterol


 Xinafoate/


 Fluticasone


  (Advair Diskus


 250/50 Inh)  1 puff  BID


 INH  9/10/17 21:00


 10/10/17 20:59  9/10/17 22:18


1 PUFF


 


 Gabapentin


  (Neurontin Cap)  300 mg  TID


 PO  9/10/17 21:00


 10/10/17 20:59  9/15/17 07:02


300 MG


 


 Acetaminophen/


 Hydrocodone Bitart


  (Norco 7.5/325


 Tab)  1 tab  Q6  PRN


 PO  9/10/17 19:15


 9/24/17 19:14   


 


 


 Lorazepam


  (Ativan Tab)  1 mg  Q8H  PRN


 PO  9/10/17 19:15


 10/10/17 19:14  9/14/17 23:09


1 MG


 


 Losartan Potassium


  (coZAAR TAB)  100 mg  QAM


 PO  9/11/17 09:00


 10/11/17 08:59  9/15/17 07:02


100 MG


 


 Magnesium Oxide


  (Mag-Ox Tab)  400 mg  BID


 PO  9/10/17 21:00


 10/10/17 20:59  9/15/17 07:03


400 MG


 


 Montelukast Sodium


  (Singulair Tab)  10 mg  HS


 PO  9/10/17 21:00


 10/10/17 20:59  9/14/17 21:20


10 MG


 


 Multivitamins


  (Multivitamin


 Tab)  1 tab  QAM


 PO  9/11/17 09:00


 10/11/17 08:59  9/15/17 07:03


1 TAB


 


 Oxycodone HCl


  (Roxicodone


 Immediate Rel Tab)  5 mg  Q4H  PRN


 PO  9/10/17 19:15


 9/24/17 19:14  9/15/17 09:45


5 MG


 


 Ranitidine HCl


  (zANTac TAB)  150 mg  HS


 PO  9/10/17 21:00


 10/10/17 20:59  9/14/17 21:19


150 MG


 


 Tramadol HCl


  (Ultram Tab)  50 mg  TID  PRN


 PO  9/10/17 19:15


 10/10/17 19:14   


 


 


 Calcium/Vitamin D


  (Caltrate Plus


 Tab)  1 tab  QAM


 PO  9/11/17 09:00


 10/11/17 08:59  9/15/17 07:03


1 TAB


 


 Pantoprazole


 Sodium


  (Protonix Tab)  40 mg  DAILY


 PO  9/11/17 09:00


 10/11/17 08:59  9/15/17 07:01


40 MG


 


 Morphine Sulfate


  (MoRPHine


 SULFATE INJ)  4 mg  Q4H  PRN


 IV  9/11/17 00:00


 9/24/17 19:59  9/13/17 18:02


4 MG


 


 Hydromorphone HCl


  (Dilaudid Inj)  0.5 mg  Q4H  PRN


 IV  9/10/17 21:00


 9/24/17 20:59  9/14/17 23:09


0.5 MG


 


 Nicotine


  (Nicoderm Cq


 14MG Patch)  1 patch  QAM


 TD  9/11/17 09:00


 10/11/17 08:59  9/15/17 07:04


1 PATCH


 


 Miscellaneous


  (Remove Nicoderm


 Patch)  1 ea  HS


 N/A  9/11/17 21:00


 10/11/17 20:59  9/14/17 21:18


1 EA


 


 Ipratropium


 Bromide


  (Atrovent 0.02%


 0.5MG/2.5ML Neb)  0.5 mg  Q4R


 INH  9/11/17 00:00


 10/11/17 00:00  9/15/17 07:19


0.5 MG


 


 Levalbuterol


  (Xopenex 1.25MG/


 0.5ML Neb)  1.25 mg  Q4R


 INH  9/11/17 00:00


 10/11/17 00:00  9/15/17 07:19


1.25 MG


 


 Imipenem/


 Cilastatin Sodium


  (Consult)  1 ea  DAILY  PRN


 N/A  9/11/17 06:24


 10/11/17 06:23   


 


 


 Metoprolol


 Tartrate


  (Lopressor Tab)  50 mg  BID@0630,1600


 PO  9/11/17 16:00


 10/11/17 15:59  9/15/17 05:49


50 MG


 


 Imipenem/


 Cilastatin Sodium


 500 mg/Dextrose  110 ml @ 


 110 mls/hr  Q6@0400,1000,1600,2200


 IV  9/11/17 16:00


 9/18/17 15:59  9/15/17 09:45


110 MLS/HR


 


 Budesonide/


 Formoterol


 Fumarate


  (Symbicort 160/


 4.5 Inh)  2 puffs  BID


 INH  9/12/17 21:00


 10/12/17 20:59  9/15/17 07:01


2 PUFFS


 


 Tiotropium Bromide


  (Spiriva


 Respimat)  2 puff  DAILY


 INH  9/13/17 09:00


 10/13/17 08:59  9/15/17 07:01


2 PUFF


 


 Ketorolac


 Tromethamine


  (Toradol Inj)  15 mg  Q6H  PRN


 IV  9/13/17 14:45


 9/18/17 14:44  9/14/17 12:20


15 MG


 


 Enoxaparin Sodium


  (Lovenox Inj)  40 mg  DAILY


 SQ  9/14/17 09:00


 10/14/17 08:59  9/15/17 07:04


40 MG


 


 Morphine Sulfate


  (MoRPHine


 SULFATE INJ)    Q1H  PRN


 IV  9/13/17 14:45


 9/27/17 14:44  9/14/17 04:14


2 MG


 


 Menthol


  (Nice Jeremiah)  1 jeremiah  PRN  PRN


 PO  9/14/17 04:45


 10/14/17 04:44   


 


 


 Acetaminophen


  (Tylenol Tab)  650 mg  Q4H  PRN


 PO  9/14/17 14:15


 10/14/17 14:14   


 








Vital Signs:











  Date Time  Temp Pulse Resp B/P (MAP) Pulse Ox O2 Delivery O2 Flow Rate FiO2


 


9/15/17 10:57 36.7 89 17 147/78 (101) 93 Room Air  


 


9/15/17 07:22 36.3 80 19 126/77 (93) 88 Nebulizer  


 


9/15/17 07:19  80 18  87 Room Air  


 


9/15/17 05:47  103  125/68 (87)    


 


9/15/17 00:00      Room Air  


 


9/14/17 23:28  88 18  91 Room Air  


 


9/14/17 23:00 36.7 88 19 115/69 (84) 93 Room Air  


 


9/14/17 20:14  83 18  95 Room Air  90


 


9/14/17 16:09  93   96 Room Air  


 


9/14/17 15:48  84 18  95 Nasal Cannula 2.0 


 


9/14/17 15:45     94 Room Air  


 


9/14/17 15:15 36.4 64 18 160/80 (106) 94 Room Air  








Laboratory Results:





Last 24 Hours








Test


  9/15/17


06:58


 


White Blood Count 15.32 K/uL 


 


Red Blood Count 3.51 M/uL 


 


Hemoglobin 10.8 g/dL 


 


Hematocrit 32.1 % 


 


Mean Corpuscular Volume 91.5 fL 


 


Mean Corpuscular Hemoglobin 30.8 pg 


 


Mean Corpuscular Hemoglobin


Concent 33.6 g/dl 


 


 


RDW Standard Deviation 45.8 fL 


 


RDW Coefficient of Variation 13.7 % 


 


Platelet Count 409 K/uL 


 


Mean Platelet Volume 9.1 fL 


 


Sodium Level 133 mmol/L 


 


Potassium Level 4.3 mmol/L 


 


Chloride Level 100 mmol/L 


 


Carbon Dioxide Level 28 mmol/L 


 


Anion Gap 5.0 mmol/L 


 


Blood Urea Nitrogen 9 mg/dl 


 


Creatinine 0.79 mg/dl 


 


Est Creatinine Clear Calc


Drug Dose 69.9 ml/min 


 


 


Estimated GFR (


American) 87.9 


 


 


Estimated GFR (Non-


American 75.8 


 


 


BUN/Creatinine Ratio 11.3 


 


Random Glucose 99 mg/dl 


 


Calcium Level 9.2 mg/dl 


 


Magnesium Level 2.0 mg/dl 


 


Chemistry Specimen Hemolysis

## 2017-09-15 NOTE — PROGRESS NOTE
Internal Med Progress Note


Date of Service:


Sep 15, 2017.


Provider Documentation:





SUBJECTIVE:





Seen and examined at bedside 


States having soreness at site of chest tube


Has intermittent dry cough


Denies chest pain, SOB 


Offers no other complaints


Feels tired 





OBJECTIVE:





Vital Signs-as noted below





Physical Exam:


General Appearance:Moderately built and nourished, no apparent distress


Head:  normocephalic, Atraumatic 


Eyes:  normal inspection, EOMI, PERRL


Neck:  supple, Trachea midline


Respiratory/Chest: Decreased breath sounds, Mild scattered wheezes, + chest tube


Cardiovascular: S1, S2, No murmur


Abdomen/GI:Soft, Non tender, Bowel sounds present


Extremities/Musculoskelatal:normal inspection, no edema 


Neurologic/Psych:grossly no focal neurological deficits 


Skin:  normal color, warm





Lab data as noted below.


ASSESSMENT & PLAN:


Patient is a 71 yr female with H/O CAD/CABG, HTN, COPD presenting with left 

sided chest pain.








LEFT SIDED PNEUMOTHORAX


Left lung mass S/P robot-assisted left thoracoscopy with excision of a mass POD 

# 2


Frozen section: Negative for malignant cells, shows infectious process    


Continue Chest tube per CT surgery 


CT chest showed 30% left-sided pneumothorax, extensive subcutaneous emphysema 

over the left hemithorax and small L effusion with partial LLL atelectasis


Appreciate Pulmonology/CT surgery help 


Continue Primaxin for now. Plan to transition to Augmentin if cultures negative


Leukocytosis trending down    


Appreciate ID Input


Blood/Sputum culture negative 


Bronchial cultures: rare yeast  


 





COPD


H/O chronic tobacco use 


Continue home inhalers   


pulmonology and CT surgery following 


counselled for smoking cessation 


Nicotine patch








CAD/CABG


Held ASA, Plavix for thoracoscopic eval 


continue Losartan, Metoprolol


hold Lasix for now 


Plan to resume ASA/Plavix when appropriate








Hyponatremia/Hypokalemia:


Replace and monitor 








HTN


continue Losartan, Metoprolol








DVT PX


Lovenox SQ








Code Status


FULL CODE 





DISPOSITION


anticipate d/c home when medically stable


will need PT/OT eval prior to discharge 


follows with Family practice at [place] area 


would like to follow up with Pulmonology Dr Frazier in [place]


Vital Signs:











  Date Time  Temp Pulse Resp B/P (MAP) Pulse Ox O2 Delivery O2 Flow Rate FiO2


 


9/15/17 11:49  82 18  94 Room Air  


 


9/15/17 10:57 36.7 89 17 147/78 (101) 93 Room Air  


 


9/15/17 07:22 36.3 80 19 126/77 (93) 88 Nebulizer  


 


9/15/17 07:19  80 18  87 Room Air  


 


9/15/17 05:47  103  125/68 (87)    


 


9/15/17 00:00      Room Air  


 


9/14/17 23:28  88 18  91 Room Air  


 


9/14/17 23:00 36.7 88 19 115/69 (84) 93 Room Air  


 


9/14/17 20:14  83 18  95 Room Air  90


 


9/14/17 16:09  93   96 Room Air  


 


9/14/17 15:48  84 18  95 Nasal Cannula 2.0 


 


9/14/17 15:45     94 Room Air  


 


9/14/17 15:15 36.4 64 18 160/80 (106) 94 Room Air  








Lab Results:





Results Past 24 Hours








Test


  9/15/17


06:58 Range/Units


 


 


White Blood Count 15.32 4.8-10.8  K/uL


 


Red Blood Count 3.51 4.2-5.4  M/uL


 


Hemoglobin 10.8 12.0-16.0  g/dL


 


Hematocrit 32.1 37-47  %


 


Mean Corpuscular Volume 91.5   fL


 


Mean Corpuscular Hemoglobin 30.8 25-34  pg


 


Mean Corpuscular Hemoglobin


Concent 33.6


  32-36  g/dl


 


 


RDW Standard Deviation 45.8 36.4-46.3  fL


 


RDW Coefficient of Variation 13.7 11.5-14.5  %


 


Platelet Count 409 130-400  K/uL


 


Mean Platelet Volume 9.1 7.4-10.4  fL


 


Sodium Level 133 136-145  mmol/L


 


Potassium Level 4.3 3.5-5.1  mmol/L


 


Chloride Level 100   mmol/L


 


Carbon Dioxide Level 28 21-32  mmol/L


 


Anion Gap 5.0 3-11  mmol/L


 


Blood Urea Nitrogen 9 7-18  mg/dl


 


Creatinine


  0.79


  0.60-1.20


mg/dl


 


Est Creatinine Clear Calc


Drug Dose 69.9


   ml/min


 


 


Estimated GFR (


American) 87.9


   


 


 


Estimated GFR (Non-


American 75.8


   


 


 


BUN/Creatinine Ratio 11.3 10-20  


 


Random Glucose 99 70-99  mg/dl


 


Calcium Level 9.2 8.5-10.1  mg/dl


 


Magnesium Level 2.0 1.8-2.4  mg/dl


 


Chemistry Specimen Hemolysis

## 2017-09-15 NOTE — SURGERY PROGRESS NOTE
Subjective


Date of Service:  Sep 15, 2017.


Pt. notes pain from chest tube.  No SOB





Discussed with RN--pt. ambulating well and no issues noted.





Objective


Vitals











  Date Time  Temp Pulse Resp B/P (MAP) Pulse Ox O2 Delivery O2 Flow Rate FiO2


 


9/15/17 07:22 36.3 80 19 126/77 (93) 88 Nebulizer  


 


9/15/17 07:19  80 18  87 Room Air  


 


9/15/17 05:47  103  125/68 (87)    


 


9/15/17 00:00      Room Air  


 


9/14/17 23:28  88 18  91 Room Air  


 


9/14/17 23:00 36.7 88 19 115/69 (84) 93 Room Air  


 


9/14/17 20:14  83 18  95 Room Air  90


 


9/14/17 16:09  93   96 Room Air  


 


9/14/17 15:48  84 18  95 Nasal Cannula 2.0 


 


9/14/17 15:45     94 Room Air  


 


9/14/17 15:15 36.4 64 18 160/80 (106) 94 Room Air  


 


9/14/17 11:43 36.7 79 18 128/69 (88) 95 Nasal Cannula 2.0 


 


9/14/17 11:10  86 18  92 Nasal Cannula 2.0 











Physical Exam


General:  + well developed, + well nourished, 


   No distress


Pulmonary:  + lungs clear, 


   No accessory muscle use, No respiratory distress


Neurologic:  + alert & oriented x 3





Drains / Tubes


chest tube (air leak noted; 190 cc last shift)





Assessment & Plan


71 year old female s/p LVATS with excision of mass


-path thus far shows concern for infection process and was (-) for CA


-maintain on abx.





-keep CT in place until air leak stops 





-encourage use of IS and ambulation 





OTHER


lovenox for DVT prevention

## 2017-09-16 VITALS — SYSTOLIC BLOOD PRESSURE: 121 MMHG | HEART RATE: 96 BPM | DIASTOLIC BLOOD PRESSURE: 76 MMHG

## 2017-09-16 VITALS
SYSTOLIC BLOOD PRESSURE: 131 MMHG | OXYGEN SATURATION: 92 % | TEMPERATURE: 98.06 F | HEART RATE: 107 BPM | DIASTOLIC BLOOD PRESSURE: 73 MMHG

## 2017-09-16 VITALS — OXYGEN SATURATION: 91 % | HEART RATE: 110 BPM

## 2017-09-16 VITALS — OXYGEN SATURATION: 90 % | HEART RATE: 82 BPM

## 2017-09-16 VITALS — HEART RATE: 94 BPM | OXYGEN SATURATION: 93 %

## 2017-09-16 VITALS — HEART RATE: 95 BPM | OXYGEN SATURATION: 90 %

## 2017-09-16 VITALS — OXYGEN SATURATION: 93 % | HEART RATE: 91 BPM

## 2017-09-16 VITALS — OXYGEN SATURATION: 91 % | HEART RATE: 99 BPM

## 2017-09-16 VITALS — HEART RATE: 94 BPM | OXYGEN SATURATION: 91 % | SYSTOLIC BLOOD PRESSURE: 124 MMHG | DIASTOLIC BLOOD PRESSURE: 78 MMHG

## 2017-09-16 VITALS
HEART RATE: 96 BPM | SYSTOLIC BLOOD PRESSURE: 142 MMHG | OXYGEN SATURATION: 92 % | TEMPERATURE: 97.88 F | DIASTOLIC BLOOD PRESSURE: 84 MMHG

## 2017-09-16 VITALS — OXYGEN SATURATION: 91 %

## 2017-09-16 VITALS
SYSTOLIC BLOOD PRESSURE: 116 MMHG | HEART RATE: 79 BPM | OXYGEN SATURATION: 91 % | DIASTOLIC BLOOD PRESSURE: 68 MMHG | TEMPERATURE: 98.06 F

## 2017-09-16 LAB
EOSINOPHIL NFR BLD AUTO: 406 K/UL (ref 130–400)
HCT VFR BLD CALC: 31.6 % (ref 37–47)
MCH RBC QN AUTO: 30.7 PG (ref 25–34)
MCHC RBC AUTO-ENTMCNC: 33.2 G/DL (ref 32–36)
MCV RBC AUTO: 92.4 FL (ref 80–100)
PMV BLD AUTO: 9 FL (ref 7.4–10.4)
RBC # BLD AUTO: 3.42 M/UL (ref 4.2–5.4)
WBC # BLD AUTO: 12.05 K/UL (ref 4.8–10.8)

## 2017-09-16 RX ADMIN — FLUTICASONE PROPIONATE AND SALMETEROL SCH PUFF: 50; 250 POWDER RESPIRATORY (INHALATION) at 09:00

## 2017-09-16 RX ADMIN — IMIPENEM AND CILASTATIN SODIUM SCH MLS/HR: 500; 500 INJECTION, POWDER, FOR SOLUTION INTRAVENOUS at 17:44

## 2017-09-16 RX ADMIN — ENOXAPARIN SODIUM SCH MG: 40 INJECTION SUBCUTANEOUS at 09:06

## 2017-09-16 RX ADMIN — HYDROMORPHONE HYDROCHLORIDE PRN MG: 1 INJECTION, SOLUTION INTRAMUSCULAR; INTRAVENOUS; SUBCUTANEOUS at 02:43

## 2017-09-16 RX ADMIN — FLUTICASONE PROPIONATE AND SALMETEROL SCH PUFF: 50; 250 POWDER RESPIRATORY (INHALATION) at 20:37

## 2017-09-16 RX ADMIN — Medication SCH MG: at 09:06

## 2017-09-16 RX ADMIN — GABAPENTIN SCH MG: 300 CAPSULE ORAL at 20:43

## 2017-09-16 RX ADMIN — BUDESONIDE AND FORMOTEROL FUMARATE DIHYDRATE SCH PUFFS: 160; 4.5 AEROSOL RESPIRATORY (INHALATION) at 09:01

## 2017-09-16 RX ADMIN — IPRATROPIUM BROMIDE SCH MG: 0.5 SOLUTION RESPIRATORY (INHALATION) at 11:18

## 2017-09-16 RX ADMIN — Medication SCH TAB: at 09:03

## 2017-09-16 RX ADMIN — MONTELUKAST SODIUM SCH MG: 10 TABLET, FILM COATED ORAL at 20:43

## 2017-09-16 RX ADMIN — METOPROLOL TARTRATE SCH MG: 50 TABLET, FILM COATED ORAL at 06:09

## 2017-09-16 RX ADMIN — LEVALBUTEROL SCH MG: 1.25 SOLUTION, CONCENTRATE RESPIRATORY (INHALATION) at 11:19

## 2017-09-16 RX ADMIN — METOPROLOL TARTRATE SCH MG: 50 TABLET, FILM COATED ORAL at 16:30

## 2017-09-16 RX ADMIN — NICOTINE SCH PATCH: 7 PATCH, EXTENDED RELEASE TRANSDERMAL at 09:05

## 2017-09-16 RX ADMIN — GABAPENTIN SCH MG: 300 CAPSULE ORAL at 09:05

## 2017-09-16 RX ADMIN — LEVALBUTEROL SCH MG: 1.25 SOLUTION, CONCENTRATE RESPIRATORY (INHALATION) at 19:09

## 2017-09-16 RX ADMIN — RANITIDINE SCH MG: 150 TABLET ORAL at 19:08

## 2017-09-16 RX ADMIN — LEVALBUTEROL SCH MG: 1.25 SOLUTION, CONCENTRATE RESPIRATORY (INHALATION) at 23:16

## 2017-09-16 RX ADMIN — ATORVASTATIN CALCIUM SCH MG: 10 TABLET, FILM COATED ORAL at 20:43

## 2017-09-16 RX ADMIN — BUDESONIDE AND FORMOTEROL FUMARATE DIHYDRATE SCH PUFFS: 160; 4.5 AEROSOL RESPIRATORY (INHALATION) at 20:37

## 2017-09-16 RX ADMIN — LOSARTAN POTASSIUM SCH MG: 50 TABLET, FILM COATED ORAL at 09:05

## 2017-09-16 RX ADMIN — OXYCODONE HYDROCHLORIDE PRN MG: 5 TABLET ORAL at 21:46

## 2017-09-16 RX ADMIN — IPRATROPIUM BROMIDE SCH MG: 0.5 SOLUTION RESPIRATORY (INHALATION) at 19:09

## 2017-09-16 RX ADMIN — GABAPENTIN SCH MG: 300 CAPSULE ORAL at 13:41

## 2017-09-16 RX ADMIN — IMIPENEM AND CILASTATIN SODIUM SCH MLS/HR: 500; 500 INJECTION, POWDER, FOR SOLUTION INTRAVENOUS at 09:30

## 2017-09-16 RX ADMIN — HYDROCODONE BITARTRATE AND ACETAMINOPHEN PRN TAB: 7.5; 325 TABLET ORAL at 17:44

## 2017-09-16 RX ADMIN — LEVALBUTEROL SCH MG: 1.25 SOLUTION, CONCENTRATE RESPIRATORY (INHALATION) at 07:19

## 2017-09-16 RX ADMIN — Medication SCH MG: at 20:43

## 2017-09-16 RX ADMIN — PANTOPRAZOLE SCH MG: 40 TABLET, DELAYED RELEASE ORAL at 09:06

## 2017-09-16 RX ADMIN — IPRATROPIUM BROMIDE SCH MG: 0.5 SOLUTION RESPIRATORY (INHALATION) at 23:16

## 2017-09-16 RX ADMIN — HYDROMORPHONE HYDROCHLORIDE PRN MG: 1 INJECTION, SOLUTION INTRAMUSCULAR; INTRAVENOUS; SUBCUTANEOUS at 20:35

## 2017-09-16 RX ADMIN — DOCUSATE SODIUM SCH MG: 100 CAPSULE, LIQUID FILLED ORAL at 09:03

## 2017-09-16 RX ADMIN — IPRATROPIUM BROMIDE SCH MG: 0.5 SOLUTION RESPIRATORY (INHALATION) at 07:19

## 2017-09-16 RX ADMIN — HYDROCODONE BITARTRATE AND ACETAMINOPHEN PRN TAB: 7.5; 325 TABLET ORAL at 11:39

## 2017-09-16 RX ADMIN — IPRATROPIUM BROMIDE SCH MG: 0.5 SOLUTION RESPIRATORY (INHALATION) at 15:26

## 2017-09-16 RX ADMIN — IMIPENEM AND CILASTATIN SODIUM SCH MLS/HR: 500; 500 INJECTION, POWDER, FOR SOLUTION INTRAVENOUS at 22:50

## 2017-09-16 RX ADMIN — IPRATROPIUM BROMIDE SCH MG: 0.5 SOLUTION RESPIRATORY (INHALATION) at 04:01

## 2017-09-16 RX ADMIN — Medication SCH TAB: at 09:07

## 2017-09-16 RX ADMIN — OXYCODONE HYDROCHLORIDE PRN MG: 5 TABLET ORAL at 16:25

## 2017-09-16 RX ADMIN — IMIPENEM AND CILASTATIN SODIUM SCH MLS/HR: 500; 500 INJECTION, POWDER, FOR SOLUTION INTRAVENOUS at 03:57

## 2017-09-16 RX ADMIN — KETOROLAC TROMETHAMINE PRN MG: 15 INJECTION INTRAMUSCULAR; INTRAVENOUS at 09:15

## 2017-09-16 RX ADMIN — HYDROCODONE BITARTRATE AND ACETAMINOPHEN PRN TAB: 7.5; 325 TABLET ORAL at 05:32

## 2017-09-16 RX ADMIN — LEVALBUTEROL SCH MG: 1.25 SOLUTION, CONCENTRATE RESPIRATORY (INHALATION) at 15:26

## 2017-09-16 RX ADMIN — LEVALBUTEROL SCH MG: 1.25 SOLUTION, CONCENTRATE RESPIRATORY (INHALATION) at 04:01

## 2017-09-16 NOTE — SURGERY PROGRESS NOTE
DATE: 09/16/2017

 

DATE:  09/16/2017  

 

Ms. Gonzalez was  seen today on 09/16/2017.  She is now postoperative day #3

status post robotic assisted left thoracoscopy with a wedge resection of what

appeared to be necrotic lung.  Her pathology is back.  It appears this was an

infectious process with bleeding.  We checked an x-ray today.  I think it 

looks very good.  I see no evidence for pneumothorax; however, she does have

increasing subcutaneous emphysema and she does have a small air leak,

although it has improved considerably.  She is draining very little from her

chest tube.  She does have some mild wheezing today.  Her weight is up  4

kilograms.  I am going to give her some Lasix as she  does have trace edema

of the lower extremities.  Her sodium is down to 133, potassium is 4.3.  Her

white count is down to 12,050 and hemoglobin is stable at 10.5.  She is on

room air, ambulating in the hallway independently.  We will have to continue

her chest tube until her leak resolves but she does look improved.

## 2017-09-16 NOTE — PROGRESS NOTE
Internal Med Progress Note


Date of Service:


Sep 16, 2017.


Provider Documentation:





SUBJECTIVE:





Seen and examined at bedside 


States having leg swelling


Denies CP, SOB 


Has some chest tube air leak 


Received lasix today


Offers no other complaints


appetite improving





OBJECTIVE:





Vital Signs-as noted below





Physical Exam:


General Appearance:Moderately built and nourished, no apparent distress


Head:  normocephalic, Atraumatic 


Eyes:  normal inspection, EOMI, PERRL


Neck:  supple, Trachea midline


Respiratory/Chest: Decreased breath sounds, Mild scattered wheezes, + chest tube


Cardiovascular: S1, S2, No murmur


Abdomen/GI:Soft, Non tender, Bowel sounds present


Extremities/Musculoskelatal:normal inspection, no edema 


Neurologic/Psych:grossly no focal neurological deficits 


Skin:  normal color, warm





Lab data as noted below.


ASSESSMENT & PLAN:


Patient is a 71 yr female with H/O CAD/CABG, HTN, COPD presenting with left 

sided chest pain.








LEFT SIDED PNEUMOTHORAX


Left lung mass S/P robot-assisted left thoracoscopy with excision of a mass POD 

# 3


Frozen section: Negative for malignant cells, shows infectious process    


Continue Chest tube per CT surgery 


CT chest showed 30% left-sided pneumothorax, extensive subcutaneous emphysema 

over the left hemithorax and small L effusion with partial LLL atelectasis


Appreciate Pulmonology/CT surgery help 


Continue Primaxin. Plan to transition to Augmentin 


Leukocytosis trending down    


Appreciate ID Input


Blood/Sputum culture negative 


Bronchial cultures: rare yeast  


 





COPD


H/O chronic tobacco use 


Continue home inhalers   


pulmonology and CT surgery following 


counselled for smoking cessation 


Nicotine patch








CAD/CABG


Held ASA, Plavix for thoracoscopic eval 


continue Losartan, Metoprolol


hold Lasix for now 


Plan to resume ASA/Plavix when appropriate








Hyponatremia/Hypokalemia:


Replace and monitor 








Chronic Leg swelling:


Was on Lasix 40mg PRN per EPIC


Check venous Doppler 


Resume lasix


monitor electrolytes  








HTN


continue Losartan, Metoprolol








DVT PX


Lovenox SQ








Code Status


FULL CODE 





DISPOSITION


anticipate d/c home when medically stable


follows with Family practice at [place] area 


would like to follow up with Pulmonology Dr Frazier in [place]


Vital Signs:











  Date Time  Temp Pulse Resp B/P (MAP) Pulse Ox O2 Delivery O2 Flow Rate FiO2


 


9/16/17 15:26  99 18  91 Room Air  


 


9/16/17 15:03 36.6 96 18 142/84 (103) 92 Room Air  


 


9/16/17 11:19  94 18  93 Room Air  


 


9/16/17 08:10     91 Room Air  


 


9/16/17 07:30 36.7 79 16 116/68 (84) 91 Room Air  


 


9/16/17 07:19  82 18  90 Room Air  


 


9/16/17 06:07  94  124/78 (93) 91 Room Air  


 


9/16/17 04:02  95 18  90 Room Air  


 


9/15/17 23:46  100 18  92 Room Air  


 


9/15/17 23:35      Room Air  


 


9/15/17 23:06 37.4 96 16 128/73 (91) 92 Room Air  


 


9/15/17 19:33  98 18  92 Room Air  








Lab Results:





Results Past 24 Hours








Test


  9/16/17


07:00 Range/Units


 


 


White Blood Count 12.05 4.8-10.8  K/uL


 


Red Blood Count 3.42 4.2-5.4  M/uL


 


Hemoglobin 10.5 12.0-16.0  g/dL


 


Hematocrit 31.6 37-47  %


 


Mean Corpuscular Volume 92.4   fL


 


Mean Corpuscular Hemoglobin 30.7 25-34  pg


 


Mean Corpuscular Hemoglobin


Concent 33.2


  32-36  g/dl


 


 


RDW Standard Deviation 46.8 36.4-46.3  fL


 


RDW Coefficient of Variation 13.8 11.5-14.5  %


 


Platelet Count 406 130-400  K/uL


 


Mean Platelet Volume 9.0 7.4-10.4  fL

## 2017-09-16 NOTE — DIAGNOSTIC IMAGING REPORT
ULTRASOUND VENOUS DOPPLER LWR EXT BILA 



CLINICAL HISTORY: Lower extremity swelling    



COMPARISON STUDY:  No previous studies for comparison. 



FINDINGS: Real-time and color flow Doppler imaging were performed. Flow was seen

within the femoral, popliteal and calf veins with no intraluminal thrombus

demonstrated. The saphenous vein is patent. There is lower extremity edema



IMPRESSION: No evidence of lower extremity DVT.







Electronically signed by:  Hebert Wolf M.D.

9/16/2017 5:30 PM



Dictated Date/Time:  9/16/2017 5:30 PM

## 2017-09-16 NOTE — DIAGNOSTIC IMAGING REPORT
CHEST ONE VIEW PORTABLE



CLINICAL HISTORY: Pneumothorax    



COMPARISON STUDY:  9/14/2017



FINDINGS: There are postsurgical changes of a midline sternotomy. The heart is

at the upper limits of normal in size. The left-sided chest tube remains

unchanged in position. There is increasing extensive subcutaneous emphysema. A

trace left apical pneumothorax is visualized. The subcutaneous emphysema limits

evaluation the lung parenchyma.[ 



IMPRESSION: Increasing extensive subcutaneous emphysema. No change in the

position of the left-sided chest tube. Trace left apical pneumothorax.







Electronically signed by:  Hebert Wolf M.D.

9/16/2017 8:05 AM



Dictated Date/Time:  9/16/2017 8:03 AM

## 2017-09-17 VITALS — OXYGEN SATURATION: 93 % | HEART RATE: 96 BPM

## 2017-09-17 VITALS
TEMPERATURE: 98.6 F | SYSTOLIC BLOOD PRESSURE: 130 MMHG | HEART RATE: 107 BPM | DIASTOLIC BLOOD PRESSURE: 73 MMHG | OXYGEN SATURATION: 93 %

## 2017-09-17 VITALS
DIASTOLIC BLOOD PRESSURE: 73 MMHG | SYSTOLIC BLOOD PRESSURE: 138 MMHG | TEMPERATURE: 97.7 F | OXYGEN SATURATION: 94 % | HEART RATE: 101 BPM

## 2017-09-17 VITALS — DIASTOLIC BLOOD PRESSURE: 78 MMHG | HEART RATE: 92 BPM | OXYGEN SATURATION: 95 % | SYSTOLIC BLOOD PRESSURE: 118 MMHG

## 2017-09-17 VITALS
HEART RATE: 89 BPM | SYSTOLIC BLOOD PRESSURE: 118 MMHG | OXYGEN SATURATION: 96 % | DIASTOLIC BLOOD PRESSURE: 78 MMHG | TEMPERATURE: 98.42 F

## 2017-09-17 VITALS — OXYGEN SATURATION: 92 % | SYSTOLIC BLOOD PRESSURE: 139 MMHG | HEART RATE: 106 BPM | DIASTOLIC BLOOD PRESSURE: 73 MMHG

## 2017-09-17 VITALS — HEART RATE: 100 BPM | OXYGEN SATURATION: 94 %

## 2017-09-17 VITALS — OXYGEN SATURATION: 93 %

## 2017-09-17 LAB
ANION GAP SERPL CALC-SCNC: 6 MMOL/L (ref 3–11)
BUN SERPL-MCNC: 9 MG/DL (ref 7–18)
BUN/CREAT SERPL: 10.9 (ref 10–20)
CALCIUM SERPL-MCNC: 9.5 MG/DL (ref 8.5–10.1)
CHLORIDE SERPL-SCNC: 98 MMOL/L (ref 98–107)
CO2 SERPL-SCNC: 31 MMOL/L (ref 21–32)
CREAT CL PREDICTED SERPL C-G-VRATE: 69.1 ML/MIN
CREAT SERPL-MCNC: 0.8 MG/DL (ref 0.6–1.2)
EOSINOPHIL NFR BLD AUTO: 438 K/UL (ref 130–400)
GLUCOSE SERPL-MCNC: 102 MG/DL (ref 70–99)
HCT VFR BLD CALC: 33 % (ref 37–47)
MAGNESIUM SERPL-MCNC: 1.9 MG/DL (ref 1.8–2.4)
MCH RBC QN AUTO: 30.9 PG (ref 25–34)
MCHC RBC AUTO-ENTMCNC: 33.9 G/DL (ref 32–36)
MCV RBC AUTO: 90.9 FL (ref 80–100)
PMV BLD AUTO: 8.9 FL (ref 7.4–10.4)
POTASSIUM SERPL-SCNC: 3.8 MMOL/L (ref 3.5–5.1)
RBC # BLD AUTO: 3.63 M/UL (ref 4.2–5.4)
SODIUM SERPL-SCNC: 135 MMOL/L (ref 136–145)
WBC # BLD AUTO: 16.26 K/UL (ref 4.8–10.8)

## 2017-09-17 RX ADMIN — IPRATROPIUM BROMIDE SCH MG: 0.5 SOLUTION RESPIRATORY (INHALATION) at 15:30

## 2017-09-17 RX ADMIN — Medication SCH MG: at 20:31

## 2017-09-17 RX ADMIN — Medication SCH TAB: at 08:36

## 2017-09-17 RX ADMIN — OXYCODONE HYDROCHLORIDE PRN MG: 5 TABLET ORAL at 20:35

## 2017-09-17 RX ADMIN — FUROSEMIDE SCH MG: 40 TABLET ORAL at 08:38

## 2017-09-17 RX ADMIN — LORAZEPAM PRN MG: 1 TABLET ORAL at 16:31

## 2017-09-17 RX ADMIN — METOPROLOL TARTRATE SCH MG: 50 TABLET, FILM COATED ORAL at 06:19

## 2017-09-17 RX ADMIN — DOCUSATE SODIUM SCH MG: 100 CAPSULE, LIQUID FILLED ORAL at 08:37

## 2017-09-17 RX ADMIN — FLUTICASONE PROPIONATE AND SALMETEROL SCH PUFF: 50; 250 POWDER RESPIRATORY (INHALATION) at 20:23

## 2017-09-17 RX ADMIN — HYDROCODONE BITARTRATE AND ACETAMINOPHEN PRN TAB: 7.5; 325 TABLET ORAL at 17:09

## 2017-09-17 RX ADMIN — ATORVASTATIN CALCIUM SCH MG: 10 TABLET, FILM COATED ORAL at 20:31

## 2017-09-17 RX ADMIN — BUDESONIDE AND FORMOTEROL FUMARATE DIHYDRATE SCH PUFFS: 160; 4.5 AEROSOL RESPIRATORY (INHALATION) at 08:37

## 2017-09-17 RX ADMIN — ENOXAPARIN SODIUM SCH MG: 40 INJECTION SUBCUTANEOUS at 08:35

## 2017-09-17 RX ADMIN — LEVALBUTEROL SCH MG: 1.25 SOLUTION, CONCENTRATE RESPIRATORY (INHALATION) at 07:16

## 2017-09-17 RX ADMIN — FLUTICASONE PROPIONATE AND SALMETEROL SCH PUFF: 50; 250 POWDER RESPIRATORY (INHALATION) at 08:43

## 2017-09-17 RX ADMIN — HYDROCODONE BITARTRATE AND ACETAMINOPHEN PRN TAB: 7.5; 325 TABLET ORAL at 23:42

## 2017-09-17 RX ADMIN — LEVALBUTEROL SCH MG: 1.25 SOLUTION, CONCENTRATE RESPIRATORY (INHALATION) at 11:25

## 2017-09-17 RX ADMIN — IMIPENEM AND CILASTATIN SODIUM SCH MLS/HR: 500; 500 INJECTION, POWDER, FOR SOLUTION INTRAVENOUS at 16:01

## 2017-09-17 RX ADMIN — KETOROLAC TROMETHAMINE PRN MG: 15 INJECTION INTRAMUSCULAR; INTRAVENOUS at 07:12

## 2017-09-17 RX ADMIN — METOPROLOL TARTRATE SCH MG: 50 TABLET, FILM COATED ORAL at 16:01

## 2017-09-17 RX ADMIN — RANITIDINE SCH MG: 150 TABLET ORAL at 20:31

## 2017-09-17 RX ADMIN — BUDESONIDE AND FORMOTEROL FUMARATE DIHYDRATE SCH PUFFS: 160; 4.5 AEROSOL RESPIRATORY (INHALATION) at 20:28

## 2017-09-17 RX ADMIN — Medication SCH TAB: at 08:37

## 2017-09-17 RX ADMIN — OXYCODONE HYDROCHLORIDE PRN MG: 5 TABLET ORAL at 04:16

## 2017-09-17 RX ADMIN — KETOROLAC TROMETHAMINE PRN MG: 15 INJECTION INTRAMUSCULAR; INTRAVENOUS at 00:16

## 2017-09-17 RX ADMIN — LEVALBUTEROL SCH MG: 1.25 SOLUTION, CONCENTRATE RESPIRATORY (INHALATION) at 03:16

## 2017-09-17 RX ADMIN — OXYCODONE HYDROCHLORIDE PRN MG: 5 TABLET ORAL at 13:06

## 2017-09-17 RX ADMIN — LOSARTAN POTASSIUM SCH MG: 50 TABLET, FILM COATED ORAL at 08:37

## 2017-09-17 RX ADMIN — Medication SCH MG: at 08:36

## 2017-09-17 RX ADMIN — IPRATROPIUM BROMIDE SCH MG: 0.5 SOLUTION RESPIRATORY (INHALATION) at 19:21

## 2017-09-17 RX ADMIN — NICOTINE SCH PATCH: 7 PATCH, EXTENDED RELEASE TRANSDERMAL at 08:38

## 2017-09-17 RX ADMIN — IMIPENEM AND CILASTATIN SODIUM SCH MLS/HR: 500; 500 INJECTION, POWDER, FOR SOLUTION INTRAVENOUS at 10:36

## 2017-09-17 RX ADMIN — MONTELUKAST SODIUM SCH MG: 10 TABLET, FILM COATED ORAL at 20:31

## 2017-09-17 RX ADMIN — IPRATROPIUM BROMIDE SCH MG: 0.5 SOLUTION RESPIRATORY (INHALATION) at 03:16

## 2017-09-17 RX ADMIN — GABAPENTIN SCH MG: 300 CAPSULE ORAL at 08:37

## 2017-09-17 RX ADMIN — IMIPENEM AND CILASTATIN SODIUM SCH MLS/HR: 500; 500 INJECTION, POWDER, FOR SOLUTION INTRAVENOUS at 21:56

## 2017-09-17 RX ADMIN — IMIPENEM AND CILASTATIN SODIUM SCH MLS/HR: 500; 500 INJECTION, POWDER, FOR SOLUTION INTRAVENOUS at 03:41

## 2017-09-17 RX ADMIN — LEVALBUTEROL SCH MG: 1.25 SOLUTION, CONCENTRATE RESPIRATORY (INHALATION) at 15:30

## 2017-09-17 RX ADMIN — IPRATROPIUM BROMIDE SCH MG: 0.5 SOLUTION RESPIRATORY (INHALATION) at 07:16

## 2017-09-17 RX ADMIN — LEVALBUTEROL SCH MG: 1.25 SOLUTION, CONCENTRATE RESPIRATORY (INHALATION) at 19:20

## 2017-09-17 RX ADMIN — GABAPENTIN SCH MG: 300 CAPSULE ORAL at 20:31

## 2017-09-17 RX ADMIN — GABAPENTIN SCH MG: 300 CAPSULE ORAL at 14:37

## 2017-09-17 RX ADMIN — PANTOPRAZOLE SCH MG: 40 TABLET, DELAYED RELEASE ORAL at 08:37

## 2017-09-17 RX ADMIN — IPRATROPIUM BROMIDE SCH MG: 0.5 SOLUTION RESPIRATORY (INHALATION) at 11:25

## 2017-09-17 NOTE — PROGRESS NOTE
Internal Med Progress Note


Date of Service:


Sep 17, 2017.


Provider Documentation:





SUBJECTIVE:





Seen and examined at bedside 


States being concerned about leg swelling


Also reports pain a chest tube site


Denies SOB 


Has some chest tube air leak 


Good urine output from lasix 


Offers no other complaints


Family at bedside





OBJECTIVE:





Vital Signs-as noted below





Physical Exam:


General Appearance:Moderately built and nourished, no apparent distress


Head:  normocephalic, Atraumatic 


Eyes:  normal inspection, EOMI, PERRL


Neck:  supple, Trachea midline


Respiratory/Chest: Decreased breath sounds, CTA, + chest tube


Cardiovascular: S1, S2, No murmur


Abdomen/GI:Soft, Non tender, Bowel sounds present


Extremities/Musculoskelatal:normal inspection, no edema 


Neurologic/Psych:grossly no focal neurological deficits 


Skin:  normal color, warm





Lab data as noted below.


ASSESSMENT & PLAN:


Patient is a 71 yr female with H/O CAD/CABG, HTN, COPD presenting with left 

sided chest pain.








LEFT SIDED PNEUMOTHORAX


Left lung mass S/P robot-assisted left thoracoscopy with excision of a mass POD 

# 4


Frozen section: Negative for malignant cells, shows infectious process    


Continue Chest tube per CT surgery 


CT chest showed 30% left-sided pneumothorax, extensive subcutaneous emphysema 

over the left hemithorax and small L effusion with partial LLL atelectasis


Appreciate Pulmonology/CT surgery help 


Continue Primaxin. Plan to transition to Augmentin 


Leukocytosis up today likely secondary o pain    


Appreciate ID Input


Blood/Sputum culture negative 


Bronchial cultures: rare yeast  


Plan for repeat CXR tomorrow 





COPD


H/O chronic tobacco use 


Continue home inhalers   


pulmonology and CT surgery following 


counselled for smoking cessation 


Nicotine patch








CAD/CABG


Held ASA, Plavix for thoracoscopic eval 


continue Losartan, Metoprolol


hold Lasix for now 


Plan to resume ASA/Plavix when appropriate








Leg swelling:


S/P IV fluids following surgery


Check ECHO


Continue lasix


Daily weight


Venous doppler: No DVT


encouraged to ambulate





Hyponatremia/Hypokalemia:


monitor 








HTN


continue Losartan, Metoprolol





DVT PX


Lovenox SQ








Code Status


FULL CODE 





DISPOSITION


anticipate d/c home when medically stable


follows with Family practice at [place] area 


would like to follow up with Pulmonology Dr Frazier in [place]


Vital Signs:











  Date Time  Temp Pulse Resp B/P (MAP) Pulse Ox O2 Delivery O2 Flow Rate FiO2


 


9/17/17 15:15 36.5 101 20 138/73 (94) 94 Nasal Cannula 2.0 


 


9/17/17 11:25  96 18  93 Room Air  


 


9/17/17 10:40  92   95   


 


9/17/17 08:46      Room Air  


 


9/17/17 07:18  96 18  93 Room Air  


 


9/17/17 06:52 36.9 89 16 118/78 (91) 96  2.0 


 


9/17/17 06:00  106  139/73 (95) 92 Room Air  


 


9/17/17 00:05      Room Air  


 


9/16/17 23:16  110 18  91 Room Air  


 


9/16/17 23:04 36.7 107 16 131/73 (92) 92 Room Air  


 


9/16/17 19:09  91 18  93 Room Air  


 


9/16/17 16:40      Room Air  


 


9/16/17 16:29  96  121/76 (91)    








Lab Results:





Results Past 24 Hours








Test


  9/17/17


06:29 Range/Units


 


 


White Blood Count 16.26 4.8-10.8  K/uL


 


Red Blood Count 3.63 4.2-5.4  M/uL


 


Hemoglobin 11.2 12.0-16.0  g/dL


 


Hematocrit 33.0 37-47  %


 


Mean Corpuscular Volume 90.9   fL


 


Mean Corpuscular Hemoglobin 30.9 25-34  pg


 


Mean Corpuscular Hemoglobin


Concent 33.9


  32-36  g/dl


 


 


RDW Standard Deviation 45.8 36.4-46.3  fL


 


RDW Coefficient of Variation 13.7 11.5-14.5  %


 


Platelet Count 438 130-400  K/uL


 


Mean Platelet Volume 8.9 7.4-10.4  fL


 


Sodium Level 135 136-145  mmol/L


 


Potassium Level 3.8 3.5-5.1  mmol/L


 


Chloride Level 98   mmol/L


 


Carbon Dioxide Level 31 21-32  mmol/L


 


Anion Gap 6.0 3-11  mmol/L


 


Blood Urea Nitrogen 9 7-18  mg/dl


 


Creatinine


  0.80


  0.60-1.20


mg/dl


 


Est Creatinine Clear Calc


Drug Dose 69.1


   ml/min


 


 


Estimated GFR (


American) 86.6


   


 


 


Estimated GFR (Non-


American 74.7


   


 


 


BUN/Creatinine Ratio 10.9 10-20  


 


Random Glucose 102 70-99  mg/dl


 


Calcium Level 9.5 8.5-10.1  mg/dl


 


Magnesium Level 1.9 1.8-2.4  mg/dl

## 2017-09-17 NOTE — SURGERY PROGRESS NOTE
DATE: 09/17/2017

 

SUBJECTIVE:  Mr. Gonzalez was seen today on 09/17/2017.  She has trace edema of

her lower extremities, but is quite concerned about them.  I explained to her

quite carefully today that she had a major operation.  I also told her she

will have some swelling which will resolve over time.  She had over 2000 mL

of urine output yesterday.  She has some improvement in her swelling.  I

explained to her important just for her to walk.  She has a smaller air leak

today and she has drained very little from her chest tube.  I inspected all

of her robotic trocar sites and they are all clean.  Her chest tube sites are

clean.  She does have some subcutaneous emphysema, but overall I think she is

improving.  We are going to check a chest x-ray tomorrow and I explained to

her quite carefully that she needs to walk and continue her current

medication regimen and then hopefully we will get her out of the hospital in

the next few days when her air leak resolves.

## 2017-09-18 VITALS — OXYGEN SATURATION: 95 % | HEART RATE: 92 BPM

## 2017-09-18 VITALS
OXYGEN SATURATION: 96 % | DIASTOLIC BLOOD PRESSURE: 75 MMHG | TEMPERATURE: 98.24 F | HEART RATE: 97 BPM | SYSTOLIC BLOOD PRESSURE: 114 MMHG

## 2017-09-18 VITALS
OXYGEN SATURATION: 95 % | DIASTOLIC BLOOD PRESSURE: 78 MMHG | SYSTOLIC BLOOD PRESSURE: 126 MMHG | TEMPERATURE: 98.42 F | HEART RATE: 84 BPM

## 2017-09-18 VITALS
SYSTOLIC BLOOD PRESSURE: 103 MMHG | OXYGEN SATURATION: 93 % | HEART RATE: 106 BPM | DIASTOLIC BLOOD PRESSURE: 62 MMHG | TEMPERATURE: 99.32 F

## 2017-09-18 VITALS — SYSTOLIC BLOOD PRESSURE: 112 MMHG | HEART RATE: 100 BPM | OXYGEN SATURATION: 87 % | DIASTOLIC BLOOD PRESSURE: 74 MMHG

## 2017-09-18 VITALS — OXYGEN SATURATION: 90 % | HEART RATE: 88 BPM

## 2017-09-18 VITALS — OXYGEN SATURATION: 93 % | HEART RATE: 99 BPM

## 2017-09-18 VITALS — OXYGEN SATURATION: 94 %

## 2017-09-18 VITALS — OXYGEN SATURATION: 97 %

## 2017-09-18 VITALS — HEART RATE: 88 BPM | OXYGEN SATURATION: 90 %

## 2017-09-18 VITALS — DIASTOLIC BLOOD PRESSURE: 78 MMHG | HEART RATE: 92 BPM | SYSTOLIC BLOOD PRESSURE: 127 MMHG

## 2017-09-18 VITALS — OXYGEN SATURATION: 91 %

## 2017-09-18 LAB
ANION GAP SERPL CALC-SCNC: 6 MMOL/L (ref 3–11)
BASOPHILS # BLD: 0.02 K/UL (ref 0–0.2)
BASOPHILS NFR BLD: 0.2 %
BUN SERPL-MCNC: 9 MG/DL (ref 7–18)
BUN/CREAT SERPL: 11.2 (ref 10–20)
CALCIUM SERPL-MCNC: 8.7 MG/DL (ref 8.5–10.1)
CHLORIDE SERPL-SCNC: 100 MMOL/L (ref 98–107)
CO2 SERPL-SCNC: 31 MMOL/L (ref 21–32)
COMPLETE: YES
CREAT CL PREDICTED SERPL C-G-VRATE: 70.8 ML/MIN
CREAT SERPL-MCNC: 0.78 MG/DL (ref 0.6–1.2)
EOSINOPHIL NFR BLD AUTO: 407 K/UL (ref 130–400)
GLUCOSE SERPL-MCNC: 107 MG/DL (ref 70–99)
HCT VFR BLD CALC: 31.1 % (ref 37–47)
IG%: 2.4 %
IMM GRANULOCYTES NFR BLD AUTO: 16.6 %
LYMPHOCYTES # BLD: 2.05 K/UL (ref 1.2–3.4)
MCH RBC QN AUTO: 31.1 PG (ref 25–34)
MCHC RBC AUTO-ENTMCNC: 34.1 G/DL (ref 32–36)
MCV RBC AUTO: 91.2 FL (ref 80–100)
MONOCYTES NFR BLD: 7.1 %
NEUTROPHILS # BLD AUTO: 2.5 %
NEUTROPHILS NFR BLD AUTO: 71.2 %
PMV BLD AUTO: 8.7 FL (ref 7.4–10.4)
POTASSIUM SERPL-SCNC: 3.5 MMOL/L (ref 3.5–5.1)
RBC # BLD AUTO: 3.41 M/UL (ref 4.2–5.4)
SODIUM SERPL-SCNC: 137 MMOL/L (ref 136–145)
WBC # BLD AUTO: 12.34 K/UL (ref 4.8–10.8)

## 2017-09-18 RX ADMIN — IMIPENEM AND CILASTATIN SODIUM SCH MLS/HR: 500; 500 INJECTION, POWDER, FOR SOLUTION INTRAVENOUS at 04:02

## 2017-09-18 RX ADMIN — ACETAMINOPHEN PRN MG: 325 TABLET ORAL at 17:32

## 2017-09-18 RX ADMIN — IMIPENEM AND CILASTATIN SODIUM SCH MLS/HR: 500; 500 INJECTION, POWDER, FOR SOLUTION INTRAVENOUS at 10:24

## 2017-09-18 RX ADMIN — FUROSEMIDE SCH MG: 40 TABLET ORAL at 08:34

## 2017-09-18 RX ADMIN — ATORVASTATIN CALCIUM SCH MG: 10 TABLET, FILM COATED ORAL at 20:10

## 2017-09-18 RX ADMIN — OXYCODONE HYDROCHLORIDE PRN MG: 5 TABLET ORAL at 20:11

## 2017-09-18 RX ADMIN — HYDROCODONE BITARTRATE AND ACETAMINOPHEN PRN TAB: 7.5; 325 TABLET ORAL at 12:47

## 2017-09-18 RX ADMIN — LEVALBUTEROL SCH MG: 1.25 SOLUTION, CONCENTRATE RESPIRATORY (INHALATION) at 03:13

## 2017-09-18 RX ADMIN — GABAPENTIN SCH MG: 300 CAPSULE ORAL at 13:41

## 2017-09-18 RX ADMIN — RANITIDINE SCH MG: 150 TABLET ORAL at 20:10

## 2017-09-18 RX ADMIN — LEVALBUTEROL SCH MG: 1.25 SOLUTION, CONCENTRATE RESPIRATORY (INHALATION) at 08:49

## 2017-09-18 RX ADMIN — PANTOPRAZOLE SCH MG: 40 TABLET, DELAYED RELEASE ORAL at 08:34

## 2017-09-18 RX ADMIN — IPRATROPIUM BROMIDE SCH MG: 0.5 SOLUTION RESPIRATORY (INHALATION) at 19:42

## 2017-09-18 RX ADMIN — Medication SCH MG: at 08:34

## 2017-09-18 RX ADMIN — LOSARTAN POTASSIUM SCH MG: 50 TABLET, FILM COATED ORAL at 08:34

## 2017-09-18 RX ADMIN — GABAPENTIN SCH MG: 300 CAPSULE ORAL at 20:10

## 2017-09-18 RX ADMIN — Medication SCH TAB: at 08:35

## 2017-09-18 RX ADMIN — LEVALBUTEROL SCH MG: 1.25 SOLUTION, CONCENTRATE RESPIRATORY (INHALATION) at 00:00

## 2017-09-18 RX ADMIN — DOCUSATE SODIUM SCH MG: 100 CAPSULE, LIQUID FILLED ORAL at 08:35

## 2017-09-18 RX ADMIN — LEVALBUTEROL SCH MG: 1.25 SOLUTION, CONCENTRATE RESPIRATORY (INHALATION) at 23:17

## 2017-09-18 RX ADMIN — NICOTINE SCH PATCH: 7 PATCH, EXTENDED RELEASE TRANSDERMAL at 08:36

## 2017-09-18 RX ADMIN — IPRATROPIUM BROMIDE SCH MG: 0.5 SOLUTION RESPIRATORY (INHALATION) at 15:12

## 2017-09-18 RX ADMIN — ONDANSETRON PRN MG: 2 INJECTION INTRAMUSCULAR; INTRAVENOUS at 10:24

## 2017-09-18 RX ADMIN — GABAPENTIN SCH MG: 300 CAPSULE ORAL at 08:34

## 2017-09-18 RX ADMIN — METOPROLOL TARTRATE SCH MG: 50 TABLET, FILM COATED ORAL at 06:36

## 2017-09-18 RX ADMIN — OXYCODONE HYDROCHLORIDE PRN MG: 5 TABLET ORAL at 15:43

## 2017-09-18 RX ADMIN — BUDESONIDE AND FORMOTEROL FUMARATE DIHYDRATE SCH PUFFS: 160; 4.5 AEROSOL RESPIRATORY (INHALATION) at 08:31

## 2017-09-18 RX ADMIN — MONTELUKAST SODIUM SCH MG: 10 TABLET, FILM COATED ORAL at 20:11

## 2017-09-18 RX ADMIN — IMIPENEM AND CILASTATIN SODIUM SCH MLS/HR: 500; 500 INJECTION, POWDER, FOR SOLUTION INTRAVENOUS at 17:33

## 2017-09-18 RX ADMIN — Medication SCH TAB: at 08:34

## 2017-09-18 RX ADMIN — LEVALBUTEROL SCH MG: 1.25 SOLUTION, CONCENTRATE RESPIRATORY (INHALATION) at 15:12

## 2017-09-18 RX ADMIN — METOPROLOL TARTRATE SCH MG: 50 TABLET, FILM COATED ORAL at 15:43

## 2017-09-18 RX ADMIN — IPRATROPIUM BROMIDE SCH MG: 0.5 SOLUTION RESPIRATORY (INHALATION) at 00:00

## 2017-09-18 RX ADMIN — FLUTICASONE PROPIONATE AND SALMETEROL SCH PUFF: 50; 250 POWDER RESPIRATORY (INHALATION) at 08:33

## 2017-09-18 RX ADMIN — LEVALBUTEROL SCH MG: 1.25 SOLUTION, CONCENTRATE RESPIRATORY (INHALATION) at 11:45

## 2017-09-18 RX ADMIN — IPRATROPIUM BROMIDE SCH MG: 0.5 SOLUTION RESPIRATORY (INHALATION) at 23:17

## 2017-09-18 RX ADMIN — IPRATROPIUM BROMIDE SCH MG: 0.5 SOLUTION RESPIRATORY (INHALATION) at 03:13

## 2017-09-18 RX ADMIN — ENOXAPARIN SODIUM SCH MG: 40 INJECTION SUBCUTANEOUS at 08:36

## 2017-09-18 RX ADMIN — BUDESONIDE AND FORMOTEROL FUMARATE DIHYDRATE SCH PUFFS: 160; 4.5 AEROSOL RESPIRATORY (INHALATION) at 20:09

## 2017-09-18 RX ADMIN — LEVALBUTEROL SCH MG: 1.25 SOLUTION, CONCENTRATE RESPIRATORY (INHALATION) at 19:42

## 2017-09-18 RX ADMIN — IPRATROPIUM BROMIDE SCH MG: 0.5 SOLUTION RESPIRATORY (INHALATION) at 08:49

## 2017-09-18 RX ADMIN — IMIPENEM AND CILASTATIN SODIUM SCH MLS/HR: 500; 500 INJECTION, POWDER, FOR SOLUTION INTRAVENOUS at 22:27

## 2017-09-18 RX ADMIN — OXYCODONE HYDROCHLORIDE PRN MG: 5 TABLET ORAL at 09:04

## 2017-09-18 RX ADMIN — Medication SCH MG: at 20:10

## 2017-09-18 RX ADMIN — FLUTICASONE PROPIONATE AND SALMETEROL SCH PUFF: 50; 250 POWDER RESPIRATORY (INHALATION) at 20:14

## 2017-09-18 RX ADMIN — IPRATROPIUM BROMIDE SCH MG: 0.5 SOLUTION RESPIRATORY (INHALATION) at 11:45

## 2017-09-18 NOTE — DIAGNOSTIC IMAGING REPORT
CHEST ONE VIEW PORTABLE



CLINICAL HISTORY: Pneumothorax      



COMPARISON STUDY:  9/16/2017



FINDINGS: The cardiac and mediastinal contours remain stable. There is extensive

bilateral subcutaneous emphysema left greater than right. The left-sided chest

tube remains unchanged in position. There is no definite pneumothorax.

Evaluation of the parenchyma is limited due to the extensive subcutaneous

emphysema.[ 



IMPRESSION: Persistent extensive subcutaneous emphysema. No significant change

from the prior study. No pneumothorax is visualized.







Electronically signed by:  Hebert Wolf M.D.

9/18/2017 7:20 AM



Dictated Date/Time:  9/18/2017 7:19 AM

## 2017-09-18 NOTE — PROGRESS NOTE
Subjective


Date of Service:


Sep 18, 2017.


Subjective


pt with no events. all micro remains negative. wbc improving post op. afb 

cultures negative. remains on imipenem. afebrile.





Objective


Vital Signs











  Date Time  Temp Pulse Resp B/P (MAP) Pulse Ox O2 Delivery O2 Flow Rate FiO2


 


9/18/17 08:49  88 18  90 Room Air  


 


9/18/17 07:30     94 Room Air  


 


9/18/17 07:06 36.9 84 18 126/78 (94) 95 Humidified Oxygen 2.0 


 


9/18/17 06:37  92  127/78 (94)    


 


9/17/17 23:45     93 Nasal Cannula 2.0 90





      Humidified Oxygen  


 


9/17/17 22:56 37.0 107 18 130/73 (92) 93 Nasal Cannula 2.0 


 


9/17/17 19:21  100 18  94 Room Air  


 


9/17/17 16:00      Nasal Cannula 1.0 


 


9/17/17 15:15 36.5 101 20 138/73 (94) 94 Nasal Cannula 2.0 


 


9/17/17 11:25  96 18  93 Room Air  











Laboratory Results











Item Value  Date Time


 


Acid Fast Stain - Final Resulted 9/13/17 1323





Tissue Left Upper Lobe  


 


Gram Stain - Final Complete 9/13/17 1323





Tissue Left Upper Lobe  


 


Gram Stain - Final Complete 9/13/17 1245





Abscess Lung, Left  


 


Acid Fast Stain - Final Resulted 9/13/17 0000





Bronchial Washings Left Upper Lobe  


 


Blood Culture - Final Complete 9/10/17 1937





Blood NO GROWTH 


 


Blood Culture - Final Complete 9/10/17 1931





Blood NO GROWTH 











Last 24 Hours








Test


  9/18/17


06:17


 


White Blood Count 12.34 K/uL 


 


Red Blood Count 3.41 M/uL 


 


Hemoglobin 10.6 g/dL 


 


Hematocrit 31.1 % 


 


Mean Corpuscular Volume 91.2 fL 


 


Mean Corpuscular Hemoglobin 31.1 pg 


 


Mean Corpuscular Hemoglobin


Concent 34.1 g/dl 


 


 


Platelet Count 407 K/uL 


 


Mean Platelet Volume 8.7 fL 


 


Neutrophils (%) (Auto) 71.2 % 


 


Lymphocytes (%) (Auto) 16.6 % 


 


Monocytes (%) (Auto) 7.1 % 


 


Eosinophils (%) (Auto) 2.5 % 


 


Basophils (%) (Auto) 0.2 % 


 


Neutrophils # (Auto) 8.80 K/uL 


 


Lymphocytes # (Auto) 2.05 K/uL 


 


Monocytes # (Auto) 0.87 K/uL 


 


Eosinophils # (Auto) 0.31 K/uL 


 


Basophils # (Auto) 0.02 K/uL 


 


RDW Standard Deviation 45.9 fL 


 


RDW Coefficient of Variation 13.8 % 


 


Immature Granulocyte % (Auto) 2.4 % 


 


Immature Granulocyte # (Auto) 0.29 K/uL 


 


Sodium Level 137 mmol/L 


 


Potassium Level 3.5 mmol/L 


 


Chloride Level 100 mmol/L 


 


Carbon Dioxide Level 31 mmol/L 


 


Anion Gap 6.0 mmol/L 


 


Blood Urea Nitrogen 9 mg/dl 


 


Creatinine 0.78 mg/dl 


 


Est Creatinine Clear Calc


Drug Dose 70.8 ml/min 


 


 


Estimated GFR (


American) 89.3 


 


 


Estimated GFR (Non-


American 77.0 


 


 


BUN/Creatinine Ratio 11.2 


 


Random Glucose 107 mg/dl 


 


Calcium Level 8.7 mg/dl 











Assessment and Plan





(1) Pleural effusion


Assessment & Plan:  ? infectious vs malignant. cultures negative so far, will 

continue abx pending biopsy findings. will follow. If cultures remain negative (

was on abx at time of OR and also had azithro as outpt pta) she could be 

transitioned to po augmentin x 21 days. 





No new ID recs, will sign off, thank you





(2) Pneumothorax

## 2017-09-18 NOTE — DIAGNOSTIC IMAGING REPORT
SINGLE VIEW CHEST



CLINICAL HISTORY:  Increasing crepitus.



FINDINGS: An AP, portable, upright chest radiograph is compared to study dated

9/18/2017. Correlation is made with chest CT dated 9/10/2017. The examination is

degraded by portable technique and patient rotation.  The patient is status post

midline sternotomy. The heart is enlarged and there is atherosclerotic

calcification of the thoracic aorta. The pulmonary vasculature is noncongested.

A left-sided chest tube is again noted. The tip appears lower than on the

earlier study. Consolidative change is again seen at the left lung base.

Airspace opacities are questioned throughout the right lung but this is

difficult to assess due to extensive overlying subcutaneous gas. No pneumothorax

is clearly identified. The skeletal structures are osteopenic. The bony thorax

is grossly intact. Calcific tendinopathy is noted in the right shoulder.

Extensive subcutaneous emphysema involving the chest wall and lower neck is

again noted. This has modestly increased from earlier today.



IMPRESSION:



1. Extensive subcutaneous emphysema is again noted. This has modestly increased

from earlier today.



2. A left-sided chest tube is again noted. The tip of the tube appears lower as

compared to today's earlier study.



3. No pneumothorax is clearly seen but this is difficult to assess due to

extensive overlying soft tissue gas.



4. Consolidation is again seen at the left lung base. Opacities are questioned

in the right lung but this is difficult to assess. Clinical correlation will be

required.







Electronically signed by:  Carlitos Forman M.D.

9/18/2017 4:54 PM



Dictated Date/Time:  9/18/2017 4:50 PM

## 2017-09-18 NOTE — ECHOCARDIOGRAM REPORT
*NOTICE TO RECEIVING PARTY AGENCY**  This information is strictly Confidential and 
protected under [place] law.  [place] law prohibits you from making any further 
disclosure of this information unless further disclosure is expressly permitted by the 
written consent of the person to whom it pertains or is authorized by law.  A general 
authorization for the release of medical or other information is not sufficient for this 
purpose.  Hospital accepts no responsibility if the information is made available to any 
other person, INCLUDING THE PATIENT.



Interpretation Summary

  *  Name: YAMILETH ALMENDAREZ  Study Date: 2017 08:31 AM

  *  MRN: [id number]  Patient Location: .[facility] [place]\S\N383\S\1

  *  : 1945 (M/d/yyyy)  Gender: Female  Height: 63 in

  *  Age: 71 yrs  Ethnicity: [place]  Weight: 195 lb

  *  Ordering Physician: Duong Xiong

  *  Performed By: Torri Pierson

  *  Accession# [id number]  Account# [id number]

  *  Reason For Study: CHF

  *  BSA: 1.9 m2

  *  The study was technically difficult.

  *  The study was technically limited.

  *  -- Conclusions --

  *  The valves were not well visualized and the Doppler evaluation was not sufficient.

  *  Refer to below for details.

Procedure Details

  *  The study was technically limited.

  *  The study was technically difficult.

  *  There were technical limitations due to patient'sPoor acoustic windows secondary to 
severe lung disease.

  *  Patient has bilateral subcutaneous emphysema and a chest tube with bandages under her 
left breast making it difficult to get apical images.

  *  A contrast injection of Definity was performed to improve assessment of LV function.

  *  Contrast was injected into an intravenous site in the right arm.

  *  One vial of Definity ultrasound contrast was diluted in normal saline to a total 
volume of 10 ml.  A total of '5' ml of solution was administered during imaging.

  *  Lot # [id number] of Definity utilized for procedure.

  *  Expiration date 10/18.

  *  The attending nurse who injected the contrast agent was javy fraser RN.

  *  A complete two-dimensional transthoracic echocardiogram was performed (2D, M-mode, 
Doppler and color flow Doppler).

Left Ventricle

  *  The left ventricle is grossly normal size.

  *  Left ventricular systolic function is normal.

Right Ventricle

  *  The right ventricle is not well visualized.

Atria

  *  The left atrium is not well visualized.

  *  Right atrium not well visualized.

Mitral Valve

  *  There is mild mitral annular calcification.

  *  There is no mitral valve stenosis.

  *  Significant mitral regurgitation is absent.

Tricuspid Valve

  *  The tricuspid valve is not well visualized.

Aortic Valve

  *  The aortic valve is not well visualized.

Pulmonic Valve

  *  The pulmonic valve is not well visualized.

Great Vessels

  *  The aortic root and proximal ascending aorta are normal sized.

Pericardium/Pleural

  *  There is an echodesity in the anterior pericardial space that appears consistent with 
pericardial fat.

Great Vessels

  *  Normal inferior vena cava diameter and respiratory variation suggests normal central 
venous pressure.

Left Ventricular Diastolic Function

  *  Grade I diastolic dysfunction, (abnormal relaxation pattern).



MMode 2D Measurements and Calculations

IVSd 0.86 cm

IVSs 1.2 cm



LVIDd 3.1 cm

LVIDs 2.1 cm

LVPWd 1.1 cm

LVPWs 1.8 cm



IVS/LVPW 0.75 

FS 32.9 %

EDV(Teich) 38.7 ml

ESV(Teich) 14.4 ml

EF(Teich) 62.8 %



EDV(cubed) 30.5 ml

ESV(cubed) 9.2 ml

EF(cubed) 69.7 %

% IVS thick 39.3 %

% LVPW thick 61.0 %





LV mass(C)d 87.1 grams

LV mass(C)dI 45.5 grams/m\S\2

LV mass(C)s 105.3 grams

LV mass(C)sI 55.1 grams/m\S\2



CO(Teich) 2.0 l/min

CI(Teich) 1.1 l/min/m\S\2

SV(Teich) 24.3 ml

SI(Teich) 12.7 ml/m\S\2

CO(cubed) 1.8 l/min

CI(cubed) 0.93 l/min/m\S\2

SV(cubed) 21.3 ml

SI(cubed) 11.1 ml/m\S\2



LVAd ap4 32.2 cm\S\2

LVLd ap4 8.5 cm

EDV(MOD-sp4) 101.0 ml

LVAs ap4 15.7 cm\S\2

LVLs ap4 6.3 cm

ESV(MOD-sp4) 31.7 ml

EF(MOD-sp4) 68.6 %



CO(MOD-sp4) 5.8 l/min

CI(MOD-sp4) 3.0 l/min/m\S\2

SV(MOD-sp4) 69.3 ml

SI(MOD-sp4) 36.2 ml/m\S\2







Doppler Measurements and Calculations

MV E max joe 75.2 cm/sec

MV A max joe 99.4 cm/sec



MV E/A 0.76 



MV dec time 0.23 sec



Ao V2 max 90.1 cm/sec

Ao max PG 3.2 mmHg

Ao max PG (full) 1.2 mmHg





LV V1 max PG 2.1 mmHg



LV V1 max 71.8 cm/sec



MR max joe 538.0 cm/sec

MR max .8 mmHg

## 2017-09-18 NOTE — PROGRESS NOTE
Internal Med Progress Note


Date of Service:


Sep 18, 2017.


Provider Documentation:





SUBJECTIVE:





Seen and examined at bedside 


Feels well today


Leg swelling improving


Pain at catheter site controlled


Denies SOB  


Offers no other complaints





OBJECTIVE:





Vital Signs-as noted below





Physical Exam:


General Appearance:Moderately built and nourished, no apparent distress


Head:  normocephalic, Atraumatic 


Eyes:  normal inspection, EOMI, PERRL


Neck:  supple, Trachea midline


Respiratory/Chest: Decreased breath sounds, CTA, + chest tube


Cardiovascular: S1, S2, No murmur


Abdomen/GI:Soft, Non tender, Bowel sounds present


Extremities/Musculoskelatal:normal inspection, no edema 


Neurologic/Psych:grossly no focal neurological deficits 


Skin:  normal color, warm





Lab data as noted below.


ASSESSMENT & PLAN:


Patient is a 71 yr female with H/O CAD/CABG, HTN, COPD presenting with left 

sided chest pain.








LEFT SIDED PNEUMOTHORAX


Left lung mass S/P robot-assisted left thoracoscopy with excision of a mass POD 

# 5


Frozen section: Negative for malignant cells, shows infectious process    


Continue Chest tube per CT surgery 


CT chest showed 30% left-sided pneumothorax, extensive subcutaneous emphysema 

over the left hemithorax and small L effusion with partial LLL atelectasis


Appreciate Pulmonology/CT surgery help 


Continue Primaxin. Plan to transition to Augmentin per ID 


Leukocytosis up today likely secondary o pain    


Appreciate ID Input


Blood/Sputum culture negative 


Bronchial cultures: rare yeast  


CXR:: Persistent extensive subcutaneous emphysema


leukocytosis resolving








COPD


H/O chronic tobacco use 


Continue home inhalers   


pulmonology and CT surgery following 


counselled for smoking cessation 


Nicotine patch








CAD/CABG


Held ASA, Plavix for thoracoscopic eval 


continue Losartan, Metoprolol


hold Lasix for now 


Plan to resume ASA/Plavix when appropriate








Leg swelling:


S/P IV fluids following surgery


ECHO pending


Continue lasix at 20mg AM for now (Takes Hyzaar at home)


Daily weight


Venous doppler: No DVT


encouraged to ambulate








Hyponatremia/Hypokalemia:


Resolved


monitor 








HTN


continue Losartan, Metoprolol


takes Hyzaar at home





DVT PX


Lovenox SQ








Code Status


FULL CODE 





DISPOSITION


anticipate d/c home when medically stable once cleared by CT surgery


follows with Family practice at [place] area 


would like to follow up with Pulmonology Dr Frazier in [place]


Vital Signs:











  Date Time  Temp Pulse Resp B/P (MAP) Pulse Ox O2 Delivery O2 Flow Rate FiO2


 


9/18/17 15:13  88 18  90 Room Air  


 


9/18/17 08:49  88 18  90 Room Air  


 


9/18/17 07:30     94 Room Air  


 


9/18/17 07:06 36.9 84 18 126/78 (94) 95 Humidified Oxygen 2.0 


 


9/18/17 06:37  92  127/78 (94)    


 


9/17/17 23:45     93 Nasal Cannula 2.0 90





      Humidified Oxygen  


 


9/17/17 22:56 37.0 107 18 130/73 (92) 93 Nasal Cannula 2.0 


 


9/17/17 19:21  100 18  94 Room Air  


 


9/17/17 16:00      Nasal Cannula 1.0 








Lab Results:





Results Past 24 Hours








Test


  9/18/17


06:17 Range/Units


 


 


White Blood Count 12.34 4.8-10.8  K/uL


 


Red Blood Count 3.41 4.2-5.4  M/uL


 


Hemoglobin 10.6 12.0-16.0  g/dL


 


Hematocrit 31.1 37-47  %


 


Mean Corpuscular Volume 91.2   fL


 


Mean Corpuscular Hemoglobin 31.1 25-34  pg


 


Mean Corpuscular Hemoglobin


Concent 34.1


  32-36  g/dl


 


 


Platelet Count 407 130-400  K/uL


 


Mean Platelet Volume 8.7 7.4-10.4  fL


 


Neutrophils (%) (Auto) 71.2  %


 


Lymphocytes (%) (Auto) 16.6  %


 


Monocytes (%) (Auto) 7.1  %


 


Eosinophils (%) (Auto) 2.5  %


 


Basophils (%) (Auto) 0.2  %


 


Neutrophils # (Auto) 8.80 1.4-6.5  K/uL


 


Lymphocytes # (Auto) 2.05 1.2-3.4  K/uL


 


Monocytes # (Auto) 0.87 0.11-0.59  K/uL


 


Eosinophils # (Auto) 0.31 0-0.5  K/uL


 


Basophils # (Auto) 0.02 0-0.2  K/uL


 


RDW Standard Deviation 45.9 36.4-46.3  fL


 


RDW Coefficient of Variation 13.8 11.5-14.5  %


 


Immature Granulocyte % (Auto) 2.4  %


 


Immature Granulocyte # (Auto) 0.29 0.00-0.02  K/uL


 


Sodium Level 137 136-145  mmol/L


 


Potassium Level 3.5 3.5-5.1  mmol/L


 


Chloride Level 100   mmol/L


 


Carbon Dioxide Level 31 21-32  mmol/L


 


Anion Gap 6.0 3-11  mmol/L


 


Blood Urea Nitrogen 9 7-18  mg/dl


 


Creatinine


  0.78


  0.60-1.20


mg/dl


 


Est Creatinine Clear Calc


Drug Dose 70.8


   ml/min


 


 


Estimated GFR (


American) 89.3


   


 


 


Estimated GFR (Non-


American 77.0


   


 


 


BUN/Creatinine Ratio 11.2 10-20  


 


Random Glucose 107 70-99  mg/dl


 


Calcium Level 8.7 8.5-10.1  mg/dl

## 2017-09-18 NOTE — SURGERY PROGRESS NOTE
DATE: 09/18/2017

 

Ms. Gonzalez is now 5 days status post her robot-assisted thoracoscopic wedge

resection of the left upper lobe of her lung with an apparent hematoma.  Her air

leak is smaller.  She only drained 100 mL from her chest tube yesterday.  Her

labs are improving.  All in all, I am happy with her.  She needs to walk

more.  She only walked twice in the hallway yesterday.  I think with

mobilization, she will get off the oxygen.  She still has some subcutaneous

emphysema.  It is about the same on the x-ray.  She has no infiltrates and no

effusions.

 

 

 

 

MTDD

## 2017-09-19 VITALS
HEART RATE: 76 BPM | OXYGEN SATURATION: 93 % | SYSTOLIC BLOOD PRESSURE: 118 MMHG | DIASTOLIC BLOOD PRESSURE: 64 MMHG | TEMPERATURE: 97.88 F

## 2017-09-19 VITALS
TEMPERATURE: 97.88 F | OXYGEN SATURATION: 95 % | SYSTOLIC BLOOD PRESSURE: 119 MMHG | DIASTOLIC BLOOD PRESSURE: 71 MMHG | HEART RATE: 92 BPM

## 2017-09-19 VITALS — OXYGEN SATURATION: 94 % | HEART RATE: 89 BPM

## 2017-09-19 VITALS — SYSTOLIC BLOOD PRESSURE: 117 MMHG | DIASTOLIC BLOOD PRESSURE: 68 MMHG | HEART RATE: 94 BPM | OXYGEN SATURATION: 95 %

## 2017-09-19 VITALS
HEART RATE: 68 BPM | OXYGEN SATURATION: 95 % | TEMPERATURE: 97.88 F | SYSTOLIC BLOOD PRESSURE: 112 MMHG | DIASTOLIC BLOOD PRESSURE: 62 MMHG

## 2017-09-19 VITALS — HEART RATE: 80 BPM | OXYGEN SATURATION: 96 %

## 2017-09-19 VITALS — OXYGEN SATURATION: 94 % | HEART RATE: 93 BPM

## 2017-09-19 VITALS
SYSTOLIC BLOOD PRESSURE: 123 MMHG | TEMPERATURE: 99.14 F | HEART RATE: 93 BPM | OXYGEN SATURATION: 97 % | DIASTOLIC BLOOD PRESSURE: 73 MMHG

## 2017-09-19 LAB
ANION GAP SERPL CALC-SCNC: 5 MMOL/L (ref 3–11)
BASOPHILS # BLD: 0.01 K/UL (ref 0–0.2)
BASOPHILS NFR BLD: 0.1 %
BUN SERPL-MCNC: 7 MG/DL (ref 7–18)
BUN/CREAT SERPL: 10.7 (ref 10–20)
CALCIUM SERPL-MCNC: 8.5 MG/DL (ref 8.5–10.1)
CHLORIDE SERPL-SCNC: 97 MMOL/L (ref 98–107)
CO2 SERPL-SCNC: 32 MMOL/L (ref 21–32)
COMPLETE: YES
CREAT CL PREDICTED SERPL C-G-VRATE: 80.1 ML/MIN
CREAT SERPL-MCNC: 0.69 MG/DL (ref 0.6–1.2)
EOSINOPHIL NFR BLD AUTO: 422 K/UL (ref 130–400)
GLUCOSE SERPL-MCNC: 117 MG/DL (ref 70–99)
HCT VFR BLD CALC: 30.9 % (ref 37–47)
IG%: 2 %
IMM GRANULOCYTES NFR BLD AUTO: 8.2 %
LYMPHOCYTES # BLD: 1.41 K/UL (ref 1.2–3.4)
MCH RBC QN AUTO: 30.6 PG (ref 25–34)
MCHC RBC AUTO-ENTMCNC: 33.7 G/DL (ref 32–36)
MCV RBC AUTO: 90.9 FL (ref 80–100)
MONOCYTES NFR BLD: 7 %
NEUTROPHILS # BLD AUTO: 0 %
NEUTROPHILS NFR BLD AUTO: 82.7 %
PMV BLD AUTO: 8.5 FL (ref 7.4–10.4)
POTASSIUM SERPL-SCNC: 3.5 MMOL/L (ref 3.5–5.1)
RBC # BLD AUTO: 3.4 M/UL (ref 4.2–5.4)
SODIUM SERPL-SCNC: 134 MMOL/L (ref 136–145)
WBC # BLD AUTO: 17.28 K/UL (ref 4.8–10.8)

## 2017-09-19 RX ADMIN — HYDROCODONE BITARTRATE AND ACETAMINOPHEN PRN TAB: 7.5; 325 TABLET ORAL at 08:29

## 2017-09-19 RX ADMIN — GABAPENTIN SCH MG: 300 CAPSULE ORAL at 13:31

## 2017-09-19 RX ADMIN — LEVALBUTEROL SCH MG: 1.25 SOLUTION, CONCENTRATE RESPIRATORY (INHALATION) at 23:38

## 2017-09-19 RX ADMIN — IMIPENEM AND CILASTATIN SODIUM SCH MLS/HR: 500; 500 INJECTION, POWDER, FOR SOLUTION INTRAVENOUS at 22:09

## 2017-09-19 RX ADMIN — IPRATROPIUM BROMIDE SCH MG: 0.5 SOLUTION RESPIRATORY (INHALATION) at 23:38

## 2017-09-19 RX ADMIN — OXYCODONE HYDROCHLORIDE PRN MG: 5 TABLET ORAL at 18:27

## 2017-09-19 RX ADMIN — IPRATROPIUM BROMIDE SCH MG: 0.5 SOLUTION RESPIRATORY (INHALATION) at 11:38

## 2017-09-19 RX ADMIN — LEVALBUTEROL SCH MG: 1.25 SOLUTION, CONCENTRATE RESPIRATORY (INHALATION) at 15:30

## 2017-09-19 RX ADMIN — Medication SCH MG: at 08:33

## 2017-09-19 RX ADMIN — BUDESONIDE AND FORMOTEROL FUMARATE DIHYDRATE SCH PUFFS: 160; 4.5 AEROSOL RESPIRATORY (INHALATION) at 08:31

## 2017-09-19 RX ADMIN — LORAZEPAM PRN MG: 1 TABLET ORAL at 00:26

## 2017-09-19 RX ADMIN — ENOXAPARIN SODIUM SCH MG: 40 INJECTION SUBCUTANEOUS at 10:00

## 2017-09-19 RX ADMIN — FUROSEMIDE SCH MG: 20 TABLET ORAL at 08:32

## 2017-09-19 RX ADMIN — HYDROCODONE BITARTRATE AND ACETAMINOPHEN PRN TAB: 7.5; 325 TABLET ORAL at 15:10

## 2017-09-19 RX ADMIN — METOPROLOL TARTRATE SCH MG: 50 TABLET, FILM COATED ORAL at 06:09

## 2017-09-19 RX ADMIN — IMIPENEM AND CILASTATIN SODIUM SCH MLS/HR: 500; 500 INJECTION, POWDER, FOR SOLUTION INTRAVENOUS at 16:03

## 2017-09-19 RX ADMIN — Medication SCH MG: at 22:10

## 2017-09-19 RX ADMIN — NICOTINE SCH PATCH: 7 PATCH, EXTENDED RELEASE TRANSDERMAL at 08:34

## 2017-09-19 RX ADMIN — IPRATROPIUM BROMIDE SCH MG: 0.5 SOLUTION RESPIRATORY (INHALATION) at 07:30

## 2017-09-19 RX ADMIN — LEVALBUTEROL SCH MG: 1.25 SOLUTION, CONCENTRATE RESPIRATORY (INHALATION) at 07:30

## 2017-09-19 RX ADMIN — OXYCODONE HYDROCHLORIDE PRN MG: 5 TABLET ORAL at 11:07

## 2017-09-19 RX ADMIN — FLUTICASONE PROPIONATE AND SALMETEROL SCH PUFF: 50; 250 POWDER RESPIRATORY (INHALATION) at 08:30

## 2017-09-19 RX ADMIN — METOPROLOL TARTRATE SCH MG: 50 TABLET, FILM COATED ORAL at 16:03

## 2017-09-19 RX ADMIN — PANTOPRAZOLE SCH MG: 40 TABLET, DELAYED RELEASE ORAL at 08:32

## 2017-09-19 RX ADMIN — Medication SCH TAB: at 08:34

## 2017-09-19 RX ADMIN — IMIPENEM AND CILASTATIN SODIUM SCH MLS/HR: 500; 500 INJECTION, POWDER, FOR SOLUTION INTRAVENOUS at 10:12

## 2017-09-19 RX ADMIN — BUDESONIDE AND FORMOTEROL FUMARATE DIHYDRATE SCH PUFFS: 160; 4.5 AEROSOL RESPIRATORY (INHALATION) at 16:16

## 2017-09-19 RX ADMIN — IPRATROPIUM BROMIDE SCH MG: 0.5 SOLUTION RESPIRATORY (INHALATION) at 03:45

## 2017-09-19 RX ADMIN — ATORVASTATIN CALCIUM SCH MG: 10 TABLET, FILM COATED ORAL at 22:10

## 2017-09-19 RX ADMIN — IMIPENEM AND CILASTATIN SODIUM SCH MLS/HR: 500; 500 INJECTION, POWDER, FOR SOLUTION INTRAVENOUS at 04:23

## 2017-09-19 RX ADMIN — GABAPENTIN SCH MG: 300 CAPSULE ORAL at 22:10

## 2017-09-19 RX ADMIN — LORAZEPAM PRN MG: 1 TABLET ORAL at 22:14

## 2017-09-19 RX ADMIN — DOCUSATE SODIUM SCH MG: 100 CAPSULE, LIQUID FILLED ORAL at 08:31

## 2017-09-19 RX ADMIN — HYDROCODONE BITARTRATE AND ACETAMINOPHEN PRN TAB: 7.5; 325 TABLET ORAL at 00:26

## 2017-09-19 RX ADMIN — Medication SCH TAB: at 08:32

## 2017-09-19 RX ADMIN — LEVALBUTEROL SCH MG: 1.25 SOLUTION, CONCENTRATE RESPIRATORY (INHALATION) at 11:38

## 2017-09-19 RX ADMIN — LEVALBUTEROL SCH MG: 1.25 SOLUTION, CONCENTRATE RESPIRATORY (INHALATION) at 03:45

## 2017-09-19 RX ADMIN — OXYCODONE HYDROCHLORIDE PRN MG: 5 TABLET ORAL at 22:42

## 2017-09-19 RX ADMIN — RANITIDINE SCH MG: 150 TABLET ORAL at 22:11

## 2017-09-19 RX ADMIN — IPRATROPIUM BROMIDE SCH MG: 0.5 SOLUTION RESPIRATORY (INHALATION) at 15:30

## 2017-09-19 RX ADMIN — OXYCODONE HYDROCHLORIDE PRN MG: 5 TABLET ORAL at 04:41

## 2017-09-19 RX ADMIN — IPRATROPIUM BROMIDE SCH MG: 0.5 SOLUTION RESPIRATORY (INHALATION) at 19:10

## 2017-09-19 RX ADMIN — MONTELUKAST SODIUM SCH MG: 10 TABLET, FILM COATED ORAL at 22:10

## 2017-09-19 RX ADMIN — GABAPENTIN SCH MG: 300 CAPSULE ORAL at 08:32

## 2017-09-19 RX ADMIN — LOSARTAN POTASSIUM SCH MG: 50 TABLET, FILM COATED ORAL at 08:33

## 2017-09-19 NOTE — PROGRESS NOTE
Internal Med Progress Note


Date of Service:


Sep 19, 2017.


Provider Documentation:


SUBJECTIVE:


Patient seen and examined while sitting on chair. denies shortness of breath or 

chets pain or pain at site of chest tube of lower left back 





OBJECTIVE:


Physical Exam:


General Appearance:Moderately built and nourished, no apparent distress


Head:  normocephalic, Atraumatic 


Eyes:  normal inspection, EOMI, PERRL


Neck:  supple, Trachea midline


Respiratory/Chest: has crackles audible on auscultation, + chest tube


Cardiovascular: S1, S2, No murmur


Abdomen/GI:Soft, Non tender, Bowel sounds present


Extremities: bilateral lower extremity edema 


Neurologic/Psych:grossly no focal neurological deficits 


Skin:  normal color, warm


ASSESSMENT & PLAN:


Patient is a 71 yr female with H/O CAD/CABG, HTN, COPD presenting with left 

sided chest pain and then with left lung mass s/p excision with left sided 

pneumothorax s/p chest tube





LEFT SIDED PNEUMOTHORAX


Left lung mass S/P robot-assisted left thoracoscopy with excision of a mass 

from 9/13/17


Frozen section: Negative for malignant cells, shows infectious process    


Continue Chest tube per CT surgery 


CT chest showed 30% left-sided pneumothorax, extensive subcutaneous emphysema 

over the left hemithorax and small L effusion with partial LLL atelectasis


As per recent CT surgery note 9/19/17:   Subcutaneous emphysema is better 

clinically, still has air leak





s/p left lung mass removed and perhaps infectious process:


Continue Primaxin. Plan to transition to Augmentin per ID, appreciate follow up 

ID note 


Blood/Sputum culture negative 


Bronchial cultures: rare yeast  





COPD


H/O chronic tobacco use 


Continue home inhalers   


pulmonology and CT surgery following 


counselled for smoking cessation 


Nicotine patch





CAD/CABG


Held ASA, Plavix for thoracoscopic eval 


continue Losartan, Metoprolol


hold Lasix for now 


Plan to resume ASA/Plavix when appropriate





Leg swelling:


S/P IV fluids following surgery


ECHO pending


Continue lasix at 20mg AM for now (Takes Hyzaar at home)


Daily weight


Venous doppler: No DVT


encouraged to ambulate





Hyponatremia/Hypokalemia:


Resolved


monitor 





HTN


continue Losartan, Metoprolol


takes Hyzaar at home





DVT PX


Lovenox SQ





Code Status


FULL CODE 





DISPOSITION


anticipate d/c home when medically stable once cleared by CT surgery


follows with Family practice at [place] area 


would like to follow up with [place]


Vital Signs:











  Date Time  Temp Pulse Resp B/P (MAP) Pulse Ox O2 Delivery O2 Flow Rate FiO2


 


9/19/17 15:32  93 18  94 Nasal Cannula 2.0 


 


9/19/17 15:30 36.6 92 18 119/71 (87) 95 Room Air  


 


9/19/17 11:20 36.6 76 16 118/64 (82) 93 Nasal Cannula 1.0 


 


9/19/17 09:16      Nasal Cannula 1.0 





      Humidified Oxygen  


 


9/19/17 07:30  80 18  96 Nasal Cannula 2.0 


 


9/19/17 07:17 36.6 68 16 112/62 (79) 95 Nasal Cannula 2.0 


 


9/19/17 06:07  94  117/68 (84) 95 Nasal Cannula 2.0 


 


9/18/17 23:30  92 18  95 Nasal Cannula 2.0 


 


9/18/17 23:30      Nasal Cannula 3.0 


 


9/18/17 23:03 36.8 97 20 114/75 (88) 96 Nasal Cannula 2.0 





      Humidified Oxygen  


 


9/18/17 22:00     97 Nasal Cannula 3.0 





      Humidified Oxygen  


 


9/18/17 19:48  99 18  93 Room Air  








Lab Results:





Results Past 24 Hours








Test


  9/19/17


06:42 Range/Units


 


 


White Blood Count 17.28 4.8-10.8  K/uL


 


Red Blood Count 3.40 4.2-5.4  M/uL


 


Hemoglobin 10.4 12.0-16.0  g/dL


 


Hematocrit 30.9 37-47  %


 


Mean Corpuscular Volume 90.9   fL


 


Mean Corpuscular Hemoglobin 30.6 25-34  pg


 


Mean Corpuscular Hemoglobin


Concent 33.7


  32-36  g/dl


 


 


Platelet Count 422 130-400  K/uL


 


Mean Platelet Volume 8.5 7.4-10.4  fL


 


Neutrophils (%) (Auto) 82.7  %


 


Lymphocytes (%) (Auto) 8.2  %


 


Monocytes (%) (Auto) 7.0  %


 


Eosinophils (%) (Auto) 0.0  %


 


Basophils (%) (Auto) 0.1  %


 


Neutrophils # (Auto) 14.31 1.4-6.5  K/uL


 


Lymphocytes # (Auto) 1.41 1.2-3.4  K/uL


 


Monocytes # (Auto) 1.21 0.11-0.59  K/uL


 


Eosinophils # (Auto) 0.00 0-0.5  K/uL


 


Basophils # (Auto) 0.01 0-0.2  K/uL


 


RDW Standard Deviation 45.9 36.4-46.3  fL


 


RDW Coefficient of Variation 13.6 11.5-14.5  %


 


Immature Granulocyte % (Auto) 2.0  %


 


Immature Granulocyte # (Auto) 0.34 0.00-0.02  K/uL


 


Sodium Level 134 136-145  mmol/L


 


Potassium Level 3.5 3.5-5.1  mmol/L


 


Chloride Level 97   mmol/L


 


Carbon Dioxide Level 32 21-32  mmol/L


 


Anion Gap 5.0 3-11  mmol/L


 


Blood Urea Nitrogen 7 7-18  mg/dl


 


Creatinine


  0.69


  0.60-1.20


mg/dl


 


Est Creatinine Clear Calc


Drug Dose 80.1


   ml/min


 


 


Estimated GFR (


American) 102.2


   


 


 


Estimated GFR (Non-


American 88.2


   


 


 


BUN/Creatinine Ratio 10.7 10-20  


 


Random Glucose 117 70-99  mg/dl


 


Calcium Level 8.5 8.5-10.1  mg/dl

## 2017-09-19 NOTE — SURGERY PROGRESS NOTE
DATE: 09/19/2017

 

Ms. Gonzalez was seen today.  She looks better than she did last night.  She is

requiring less oxygen and is almost off on 1 liter.  She had 94% saturations

while I was in the room.  She walked a great deal yesterday.  We are going to

continue doing that now.  Subcutaneous emphysema is better clinically.  She

still has an air leak, although it is very small.  We will talk to the

patient about ambulation.  She is not wheezing today on exam and she is in

better spirits.  We are going to have to wait until this air leak stops.

 

It is important to understand this patient had necrotic lung.  It is also

important to understand it is difficult to say where to stop when you are

resecting this.  I think the remaining lung is viable; however, we will

continue to monitor her and probably follow her up with serial CT scans after

she is discharged.

## 2017-09-20 VITALS — SYSTOLIC BLOOD PRESSURE: 127 MMHG | HEART RATE: 93 BPM | DIASTOLIC BLOOD PRESSURE: 69 MMHG

## 2017-09-20 VITALS
HEART RATE: 99 BPM | DIASTOLIC BLOOD PRESSURE: 72 MMHG | SYSTOLIC BLOOD PRESSURE: 114 MMHG | TEMPERATURE: 97.88 F | OXYGEN SATURATION: 94 %

## 2017-09-20 VITALS — HEART RATE: 99 BPM | OXYGEN SATURATION: 90 %

## 2017-09-20 VITALS
SYSTOLIC BLOOD PRESSURE: 126 MMHG | DIASTOLIC BLOOD PRESSURE: 77 MMHG | TEMPERATURE: 98.6 F | HEART RATE: 101 BPM | OXYGEN SATURATION: 92 %

## 2017-09-20 VITALS
HEART RATE: 85 BPM | OXYGEN SATURATION: 96 % | SYSTOLIC BLOOD PRESSURE: 123 MMHG | DIASTOLIC BLOOD PRESSURE: 75 MMHG | TEMPERATURE: 99.14 F

## 2017-09-20 VITALS — OXYGEN SATURATION: 92 % | HEART RATE: 93 BPM

## 2017-09-20 VITALS — OXYGEN SATURATION: 90 % | HEART RATE: 87 BPM

## 2017-09-20 LAB
ALBUMIN/GLOB SERPL: 0.7 {RATIO} (ref 0.9–2)
ALP SERPL-CCNC: 90 U/L (ref 45–117)
ALT SERPL-CCNC: 13 U/L (ref 12–78)
ANION GAP SERPL CALC-SCNC: 6 MMOL/L (ref 3–11)
AST SERPL-CCNC: 14 U/L (ref 15–37)
BASOPHILS # BLD: 0.04 K/UL (ref 0–0.2)
BASOPHILS NFR BLD: 0.2 %
BUN SERPL-MCNC: 7 MG/DL (ref 7–18)
BUN/CREAT SERPL: 11.2 (ref 10–20)
CALCIUM SERPL-MCNC: 8.9 MG/DL (ref 8.5–10.1)
CHLORIDE SERPL-SCNC: 97 MMOL/L (ref 98–107)
CO2 SERPL-SCNC: 32 MMOL/L (ref 21–32)
COMPLETE: YES
CREAT CL PREDICTED SERPL C-G-VRATE: 83.7 ML/MIN
CREAT SERPL-MCNC: 0.66 MG/DL (ref 0.6–1.2)
EOSINOPHIL NFR BLD AUTO: 498 K/UL (ref 130–400)
GLOBULIN SER-MCNC: 3.6 GM/DL (ref 2.5–4)
GLUCOSE SERPL-MCNC: 111 MG/DL (ref 70–99)
HCT VFR BLD CALC: 32 % (ref 37–47)
IG%: 1.7 %
IMM GRANULOCYTES NFR BLD AUTO: 8.4 %
LYMPHOCYTES # BLD: 1.7 K/UL (ref 1.2–3.4)
MCH RBC QN AUTO: 30.9 PG (ref 25–34)
MCHC RBC AUTO-ENTMCNC: 33.8 G/DL (ref 32–36)
MCV RBC AUTO: 91.4 FL (ref 80–100)
MONOCYTES NFR BLD: 5.8 %
NEUTROPHILS # BLD AUTO: 0.5 %
NEUTROPHILS NFR BLD AUTO: 83.4 %
PMV BLD AUTO: 8.6 FL (ref 7.4–10.4)
POTASSIUM SERPL-SCNC: 3.5 MMOL/L (ref 3.5–5.1)
RBC # BLD AUTO: 3.5 M/UL (ref 4.2–5.4)
SODIUM SERPL-SCNC: 135 MMOL/L (ref 136–145)
WBC # BLD AUTO: 20.18 K/UL (ref 4.8–10.8)

## 2017-09-20 RX ADMIN — IPRATROPIUM BROMIDE SCH MG: 0.5 SOLUTION RESPIRATORY (INHALATION) at 07:36

## 2017-09-20 RX ADMIN — OXYCODONE HYDROCHLORIDE PRN MG: 5 TABLET ORAL at 03:10

## 2017-09-20 RX ADMIN — FUROSEMIDE SCH MG: 20 TABLET ORAL at 08:43

## 2017-09-20 RX ADMIN — LEVALBUTEROL SCH MG: 1.25 SOLUTION, CONCENTRATE RESPIRATORY (INHALATION) at 15:43

## 2017-09-20 RX ADMIN — ENOXAPARIN SODIUM SCH MG: 40 INJECTION SUBCUTANEOUS at 07:56

## 2017-09-20 RX ADMIN — BUDESONIDE AND FORMOTEROL FUMARATE DIHYDRATE SCH PUFFS: 160; 4.5 AEROSOL RESPIRATORY (INHALATION) at 16:38

## 2017-09-20 RX ADMIN — LORAZEPAM PRN MG: 1 TABLET ORAL at 23:39

## 2017-09-20 RX ADMIN — GABAPENTIN SCH MG: 300 CAPSULE ORAL at 20:53

## 2017-09-20 RX ADMIN — GABAPENTIN SCH MG: 300 CAPSULE ORAL at 13:59

## 2017-09-20 RX ADMIN — IPRATROPIUM BROMIDE SCH MG: 0.5 SOLUTION RESPIRATORY (INHALATION) at 19:58

## 2017-09-20 RX ADMIN — LEVALBUTEROL SCH MG: 1.25 SOLUTION, CONCENTRATE RESPIRATORY (INHALATION) at 19:58

## 2017-09-20 RX ADMIN — BUDESONIDE AND FORMOTEROL FUMARATE DIHYDRATE SCH PUFFS: 160; 4.5 AEROSOL RESPIRATORY (INHALATION) at 07:57

## 2017-09-20 RX ADMIN — Medication SCH MG: at 08:43

## 2017-09-20 RX ADMIN — PANTOPRAZOLE SCH MG: 40 TABLET, DELAYED RELEASE ORAL at 07:56

## 2017-09-20 RX ADMIN — LEVOFLOXACIN SCH MG: 750 TABLET, FILM COATED ORAL at 11:22

## 2017-09-20 RX ADMIN — LEVALBUTEROL SCH MG: 1.25 SOLUTION, CONCENTRATE RESPIRATORY (INHALATION) at 07:37

## 2017-09-20 RX ADMIN — OXYCODONE HYDROCHLORIDE PRN MG: 5 TABLET ORAL at 20:53

## 2017-09-20 RX ADMIN — METOPROLOL TARTRATE SCH MG: 50 TABLET, FILM COATED ORAL at 06:09

## 2017-09-20 RX ADMIN — METOPROLOL TARTRATE SCH MG: 50 TABLET, FILM COATED ORAL at 15:58

## 2017-09-20 RX ADMIN — ONDANSETRON PRN MG: 2 INJECTION INTRAMUSCULAR; INTRAVENOUS at 06:20

## 2017-09-20 RX ADMIN — OXYCODONE HYDROCHLORIDE PRN MG: 5 TABLET ORAL at 12:29

## 2017-09-20 RX ADMIN — Medication SCH MG: at 20:53

## 2017-09-20 RX ADMIN — OXYCODONE HYDROCHLORIDE PRN MG: 5 TABLET ORAL at 16:43

## 2017-09-20 RX ADMIN — Medication SCH TAB: at 08:43

## 2017-09-20 RX ADMIN — IPRATROPIUM BROMIDE SCH MG: 0.5 SOLUTION RESPIRATORY (INHALATION) at 04:00

## 2017-09-20 RX ADMIN — LEVALBUTEROL SCH MG: 1.25 SOLUTION, CONCENTRATE RESPIRATORY (INHALATION) at 04:00

## 2017-09-20 RX ADMIN — GABAPENTIN SCH MG: 300 CAPSULE ORAL at 07:56

## 2017-09-20 RX ADMIN — NICOTINE SCH PATCH: 7 PATCH, EXTENDED RELEASE TRANSDERMAL at 07:56

## 2017-09-20 RX ADMIN — MONTELUKAST SODIUM SCH MG: 10 TABLET, FILM COATED ORAL at 20:53

## 2017-09-20 RX ADMIN — IPRATROPIUM BROMIDE SCH MG: 0.5 SOLUTION RESPIRATORY (INHALATION) at 15:43

## 2017-09-20 RX ADMIN — ATORVASTATIN CALCIUM SCH MG: 10 TABLET, FILM COATED ORAL at 20:53

## 2017-09-20 RX ADMIN — LOSARTAN POTASSIUM SCH MG: 50 TABLET, FILM COATED ORAL at 08:42

## 2017-09-20 RX ADMIN — IPRATROPIUM BROMIDE SCH MG: 0.5 SOLUTION RESPIRATORY (INHALATION) at 11:34

## 2017-09-20 RX ADMIN — OXYCODONE HYDROCHLORIDE PRN MG: 5 TABLET ORAL at 07:55

## 2017-09-20 RX ADMIN — LEVALBUTEROL SCH MG: 1.25 SOLUTION, CONCENTRATE RESPIRATORY (INHALATION) at 11:34

## 2017-09-20 RX ADMIN — DOCUSATE SODIUM SCH MG: 100 CAPSULE, LIQUID FILLED ORAL at 08:42

## 2017-09-20 RX ADMIN — RANITIDINE SCH MG: 150 TABLET ORAL at 20:52

## 2017-09-20 NOTE — PROGRESS NOTE
Internal Med Progress Note


Date of Service:


Sep 20, 2017.


Provider Documentation:


SUBJECTIVE:


Patient seen and examined while sitting on chair. denies shortness of breath or 

chests pain or pain at site of chest tube of lower left back. patient counseled 

on oral antibiotics. patient agrees to try levaquin as per infectious disease 

recommendations





OBJECTIVE:


Physical Exam:


General Appearance: no apparent distress


Head:  normocephalic, Atraumatic 


Eyes:  normal inspection, EOMI, PERRL


Neck:  supple, Trachea midline


Respiratory/Chest: less crackles audible on auscultation of chest compared to 

yesterday, chest tube


Cardiovascular: S1, S2, No murmur


Abdomen/GI:Soft, Non tender, Bowel sounds present


Extremities: bilateral lower extremity edema 


Neurologic/Psych:grossly no focal neurological deficits 


Skin:  normal color, warm


ASSESSMENT & PLAN:


Patient is a 71 yr female with H/O CAD/CABG, HTN, COPD presenting with left 

sided chest pain. Postoperative day #7 status post robotic resection of a


portion of the upper lobe with necrotic lung with and left chest tube





Left necrotic lung mass S/P robot-assisted left thoracoscopy with excision of a 

mass from 9/13/17


Frozen section: Negative for malignant cells, shows infectious process    


Continue Chest tube per CT surgery 


CT chest showed 30% left-sided pneumothorax, extensive subcutaneous emphysema 

over the left hemithorax and small L effusion with partial LLL atelectasis


As per recent CT surgery note 9/20/17:   Subcutaneous emphysema is better 

clinically, still has air leak, with plans to discharge patient once chest tube 

is taken out





s/p left necrotic lung mass removed elevated WBC


Transitioned from Primaxin IV to Levaquin PO as per infectious disease service 

and to keep this regimen for 21 days 


Blood/Sputum culture negative 


Bronchial cultures: rare yeast  





COPD


H/O chronic tobacco use 


Continue home inhalers   


counseled for smoking cessation, Nicotine patch





CAD/CABG


ASA, Plavix was held for thoracoscopic evaluation, will restart after chest 

tube removed 


continue Losartan, Metoprolol


hold Lasix for now 





Leg swelling:


S/P IV fluids following surgery


ECHO Grade I diastolic dysfunction however was technically limited


Continue lasix at 20mg AM for now (Takes Hyzaar at home)


Daily weight


Venous doppler: No DVT


encouraged to ambulate





Hyponatremia/Hypokalemia:


Resolved


monitor 





HTN


continue Losartan, Metoprolol


takes Hyzaar at home





DVT PX


Lovenox SQ





Code Status


FULL CODE 





DISPOSITION


anticipate d/c home when medically stable once cleared by CT surgery


follows with Family practice at [place] area 


would like to follow up with Pulmonology Dr Frazier in [place]


Vital Signs:











  Date Time  Temp Pulse Resp B/P (MAP) Pulse Ox O2 Delivery O2 Flow Rate FiO2


 


9/20/17 15:25 36.6 99 20 114/72 (86) 94 Humidified Oxygen 2.0 


 


9/20/17 11:34  87 14  90 Room Air  


 


9/20/17 07:38  99 18  90 Room Air  


 


9/20/17 07:30      Room Air  


 


9/20/17 07:21 37.3 85 17 123/75 (91) 96 Nasal Cannula 2.0 


 


9/20/17 06:06  93  127/69 (88)    


 


9/19/17 23:30      Nasal Cannula 2.0 





      Humidified Oxygen  


 


9/19/17 23:19 37.3 93 16 123/73 (90) 97 Nasal Cannula 2.0 





      Humidified Oxygen  


 


9/19/17 19:11  89 18  94 Nasal Cannula 2.0 


 


9/19/17 16:05      Nasal Cannula 1.0 








Lab Results:





Results Past 24 Hours








Test


  9/20/17


06:27 Range/Units


 


 


White Blood Count 20.18 4.8-10.8  K/uL


 


Red Blood Count 3.50 4.2-5.4  M/uL


 


Hemoglobin 10.8 12.0-16.0  g/dL


 


Hematocrit 32.0 37-47  %


 


Mean Corpuscular Volume 91.4   fL


 


Mean Corpuscular Hemoglobin 30.9 25-34  pg


 


Mean Corpuscular Hemoglobin


Concent 33.8


  32-36  g/dl


 


 


Platelet Count 498 130-400  K/uL


 


Mean Platelet Volume 8.6 7.4-10.4  fL


 


Neutrophils (%) (Auto) 83.4  %


 


Lymphocytes (%) (Auto) 8.4  %


 


Monocytes (%) (Auto) 5.8  %


 


Eosinophils (%) (Auto) 0.5  %


 


Basophils (%) (Auto) 0.2  %


 


Neutrophils # (Auto) 16.81 1.4-6.5  K/uL


 


Lymphocytes # (Auto) 1.70 1.2-3.4  K/uL


 


Monocytes # (Auto) 1.18 0.11-0.59  K/uL


 


Eosinophils # (Auto) 0.10 0-0.5  K/uL


 


Basophils # (Auto) 0.04 0-0.2  K/uL


 


RDW Standard Deviation 45.8 36.4-46.3  fL


 


RDW Coefficient of Variation 13.6 11.5-14.5  %


 


Immature Granulocyte % (Auto) 1.7  %


 


Immature Granulocyte # (Auto) 0.35 0.00-0.02  K/uL


 


Sodium Level 135 136-145  mmol/L


 


Potassium Level 3.5 3.5-5.1  mmol/L


 


Chloride Level 97   mmol/L


 


Carbon Dioxide Level 32 21-32  mmol/L


 


Anion Gap 6.0 3-11  mmol/L


 


Blood Urea Nitrogen 7 7-18  mg/dl


 


Creatinine


  0.66


  0.60-1.20


mg/dl


 


Est Creatinine Clear Calc


Drug Dose 83.7


   ml/min


 


 


Estimated GFR (


American) 103.7


   


 


 


Estimated GFR (Non-


American 89.5


   


 


 


BUN/Creatinine Ratio 11.2 10-20  


 


Random Glucose 111 70-99  mg/dl


 


Calcium Level 8.9 8.5-10.1  mg/dl


 


Total Bilirubin 0.4 0.2-1  mg/dl


 


Aspartate Amino Transf


(AST/SGOT) 14


  15-37  U/L


 


 


Alanine Aminotransferase


(ALT/SGPT) 13


  12-78  U/L


 


 


Alkaline Phosphatase 90   U/L


 


Total Protein 6.2 6.4-8.2  gm/dl


 


Albumin 2.6 3.4-5.0  gm/dl


 


Globulin 3.6 2.5-4.0  gm/dl


 


Albumin/Globulin Ratio 0.7 0.9-2

## 2017-09-20 NOTE — SURGERY PROGRESS NOTE
DATE: 09/20/2017

 

SUBJECTIVE:  Ms. Gonzalez was seen today.  She is ambulating in the hallway.  She

is on room air.  Her subcutaneous emphysema is still present, although I

think a bit better.  Swelling in her legs and her pedal area has improved. 

She has a small air leak today.  Her drainage is serous in nature.  Her

heart rate is in the 80s and 90s.  Maximal temperature is 37.3.  She has no

wheezing on auscultation today.  Her white count is 20,180 with a hemoglobin

stable at 10.8.  She is on imipenem and has been seen by infectious disease. 

We did see some yeast in her bronchial washings, otherwise, there has been no

growth from anything else.

 

ASSESSMENT AND PLAN:  Postoperative day #7 status post robotic resection of a

portion of the upper lobe with necrotic lung.  Her air leak appears to be

improving and clinically, she is stable and I am a bit worried about her

white count of over 20,000.  As her cultures are negative and she is

afebrile, we will continue to watch her.  When her air leak resolves, we will

remove her chest tube and let her go home.  It appears to be getting better

daily.

 

I have some concerns about Ms. Gonzalez.  First of all, it is still unclear to me

why she had this necrosis of her lung.  There was also some hemorrhage

subpleurally.  I did a generous wedge resection, shows no evidence of

malignancy, but the necrotic lung with blood was worrisome.  Hopefully this

will resolve and we will get her chest tube out.

 

 

 

 

KELSI

## 2017-09-20 NOTE — PROGRESS NOTE
Subjective


Date of Service:


Sep 20, 2017.


Subjective


remains afebrile. all culture negative with exception of c. albicans from one 

culture, likely colonization. remains on iv abx. has pcn allergy, was to 

transition to po augmentin but with documented allergy will transition to 

levaquin to complete course. following with CT surgery for management of chest 

tube. no acute events.





Objective


Vital Signs











  Date Time  Temp Pulse Resp B/P (MAP) Pulse Ox O2 Delivery O2 Flow Rate FiO2


 


9/20/17 07:38  99 18  90 Room Air  


 


9/20/17 07:21 37.3 85 17 123/75 (91) 96 Nasal Cannula 2.0 


 


9/20/17 06:06  93  127/69 (88)    


 


9/19/17 23:30      Nasal Cannula 2.0 





      Humidified Oxygen  


 


9/19/17 23:19 37.3 93 16 123/73 (90) 97 Nasal Cannula 2.0 





      Humidified Oxygen  


 


9/19/17 19:11  89 18  94 Nasal Cannula 2.0 


 


9/19/17 16:05      Nasal Cannula 1.0 


 


9/19/17 15:32  93 18  94 Nasal Cannula 2.0 


 


9/19/17 15:30 36.6 92 18 119/71 (87) 95 Room Air  


 


9/19/17 11:20 36.6 76 16 118/64 (82) 93 Nasal Cannula 1.0 


 


9/19/17 09:16      Nasal Cannula 1.0 





      Humidified Oxygen  











Laboratory Results











Item Value  Date Time


 


Acid Fast Stain - Final Resulted 9/13/17 1323





Tissue Left Upper Lobe  


 


Fungal Smear - Final Resulted 9/13/17 1323





Tissue Left Upper Lobe  


 


Gram Stain - Final Complete 9/13/17 1323





Tissue Left Upper Lobe  


 


Gram Stain - Final Complete 9/13/17 1245





Abscess Lung, Left  


 


Acid Fast Stain - Final Resulted 9/13/17 0000





Bronchial Washings Left Upper Lobe  


 


Gram Stain - Final Complete 9/13/17 0000





Bronchial Washings Left Upper Lobe  


 


Gram Stain - Final Complete 9/10/17 2300





Sputum Expectorated Sputum  


 


Blood Culture - Final Complete 9/10/17 1937





Blood NO GROWTH 


 


Blood Culture - Final Complete 9/10/17 1931





Blood NO GROWTH 











Last 24 Hours








Test


  9/20/17


06:27


 


White Blood Count 20.18 K/uL 


 


Red Blood Count 3.50 M/uL 


 


Hemoglobin 10.8 g/dL 


 


Hematocrit 32.0 % 


 


Mean Corpuscular Volume 91.4 fL 


 


Mean Corpuscular Hemoglobin 30.9 pg 


 


Mean Corpuscular Hemoglobin


Concent 33.8 g/dl 


 


 


Platelet Count 498 K/uL 


 


Mean Platelet Volume 8.6 fL 


 


Neutrophils (%) (Auto) 83.4 % 


 


Lymphocytes (%) (Auto) 8.4 % 


 


Monocytes (%) (Auto) 5.8 % 


 


Eosinophils (%) (Auto) 0.5 % 


 


Basophils (%) (Auto) 0.2 % 


 


Neutrophils # (Auto) 16.81 K/uL 


 


Lymphocytes # (Auto) 1.70 K/uL 


 


Monocytes # (Auto) 1.18 K/uL 


 


Eosinophils # (Auto) 0.10 K/uL 


 


Basophils # (Auto) 0.04 K/uL 


 


RDW Standard Deviation 45.8 fL 


 


RDW Coefficient of Variation 13.6 % 


 


Immature Granulocyte % (Auto) 1.7 % 


 


Immature Granulocyte # (Auto) 0.35 K/uL 


 


Sodium Level 135 mmol/L 


 


Potassium Level 3.5 mmol/L 


 


Chloride Level 97 mmol/L 


 


Carbon Dioxide Level 32 mmol/L 


 


Anion Gap 6.0 mmol/L 


 


Blood Urea Nitrogen 7 mg/dl 


 


Creatinine 0.66 mg/dl 


 


Est Creatinine Clear Calc


Drug Dose 83.7 ml/min 


 


 


Estimated GFR (


American) 103.7 


 


 


Estimated GFR (Non-


American 89.5 


 


 


BUN/Creatinine Ratio 11.2 


 


Random Glucose 111 mg/dl 


 


Calcium Level 8.9 mg/dl 


 


Total Bilirubin 0.4 mg/dl 


 


Aspartate Amino Transf


(AST/SGOT) 14 U/L 


 


 


Alanine Aminotransferase


(ALT/SGPT) 13 U/L 


 


 


Alkaline Phosphatase 90 U/L 


 


Total Protein 6.2 gm/dl 


 


Albumin 2.6 gm/dl 


 


Globulin 3.6 gm/dl 


 


Albumin/Globulin Ratio 0.7 











Assessment and Plan





(1) Pleural effusion


Assessment & Plan:  ? infectious vs malignant. cultures negative so far, will 

continue abx pending biopsy findings. will follow. If cultures remain negative (

was on abx at time of OR and also had azithro as outpt pta) she could be 

transitioned to po levaquin x 21 days. 





No new ID recs, will sign off, thank you





(2) Pneumothorax

## 2017-09-21 VITALS — OXYGEN SATURATION: 96 % | HEART RATE: 97 BPM

## 2017-09-21 VITALS
OXYGEN SATURATION: 95 % | DIASTOLIC BLOOD PRESSURE: 84 MMHG | HEART RATE: 101 BPM | SYSTOLIC BLOOD PRESSURE: 148 MMHG | TEMPERATURE: 97.88 F

## 2017-09-21 VITALS
DIASTOLIC BLOOD PRESSURE: 76 MMHG | OXYGEN SATURATION: 93 % | TEMPERATURE: 97.88 F | HEART RATE: 90 BPM | SYSTOLIC BLOOD PRESSURE: 120 MMHG

## 2017-09-21 VITALS
TEMPERATURE: 97.88 F | HEART RATE: 62 BPM | OXYGEN SATURATION: 97 % | SYSTOLIC BLOOD PRESSURE: 112 MMHG | DIASTOLIC BLOOD PRESSURE: 68 MMHG

## 2017-09-21 VITALS
TEMPERATURE: 98.42 F | OXYGEN SATURATION: 94 % | HEART RATE: 107 BPM | DIASTOLIC BLOOD PRESSURE: 78 MMHG | SYSTOLIC BLOOD PRESSURE: 118 MMHG

## 2017-09-21 VITALS — HEART RATE: 83 BPM | OXYGEN SATURATION: 96 %

## 2017-09-21 VITALS — OXYGEN SATURATION: 91 % | HEART RATE: 82 BPM

## 2017-09-21 VITALS — HEART RATE: 102 BPM | OXYGEN SATURATION: 96 %

## 2017-09-21 VITALS — OXYGEN SATURATION: 93 %

## 2017-09-21 RX ADMIN — LEVALBUTEROL SCH MG: 1.25 SOLUTION, CONCENTRATE RESPIRATORY (INHALATION) at 15:21

## 2017-09-21 RX ADMIN — Medication SCH MG: at 21:24

## 2017-09-21 RX ADMIN — ACETAMINOPHEN PRN MG: 325 TABLET ORAL at 16:22

## 2017-09-21 RX ADMIN — IPRATROPIUM BROMIDE SCH MG: 0.5 SOLUTION RESPIRATORY (INHALATION) at 00:00

## 2017-09-21 RX ADMIN — GABAPENTIN SCH MG: 300 CAPSULE ORAL at 08:14

## 2017-09-21 RX ADMIN — GABAPENTIN SCH MG: 300 CAPSULE ORAL at 21:24

## 2017-09-21 RX ADMIN — LEVALBUTEROL SCH MG: 1.25 SOLUTION, CONCENTRATE RESPIRATORY (INHALATION) at 07:36

## 2017-09-21 RX ADMIN — IPRATROPIUM BROMIDE SCH MG: 0.5 SOLUTION RESPIRATORY (INHALATION) at 04:00

## 2017-09-21 RX ADMIN — ENOXAPARIN SODIUM SCH MG: 40 INJECTION SUBCUTANEOUS at 08:15

## 2017-09-21 RX ADMIN — DOCUSATE SODIUM SCH MG: 100 CAPSULE, LIQUID FILLED ORAL at 08:15

## 2017-09-21 RX ADMIN — METOPROLOL TARTRATE SCH MG: 50 TABLET, FILM COATED ORAL at 05:57

## 2017-09-21 RX ADMIN — IPRATROPIUM BROMIDE SCH MG: 0.5 SOLUTION RESPIRATORY (INHALATION) at 11:54

## 2017-09-21 RX ADMIN — OXYCODONE HYDROCHLORIDE PRN MG: 5 TABLET ORAL at 21:25

## 2017-09-21 RX ADMIN — Medication SCH TAB: at 08:15

## 2017-09-21 RX ADMIN — FUROSEMIDE SCH MG: 20 TABLET ORAL at 08:14

## 2017-09-21 RX ADMIN — ATORVASTATIN CALCIUM SCH MG: 10 TABLET, FILM COATED ORAL at 21:24

## 2017-09-21 RX ADMIN — IPRATROPIUM BROMIDE SCH MG: 0.5 SOLUTION RESPIRATORY (INHALATION) at 15:21

## 2017-09-21 RX ADMIN — LEVALBUTEROL SCH MG: 1.25 SOLUTION, CONCENTRATE RESPIRATORY (INHALATION) at 11:54

## 2017-09-21 RX ADMIN — LEVALBUTEROL SCH MG: 1.25 SOLUTION, CONCENTRATE RESPIRATORY (INHALATION) at 00:00

## 2017-09-21 RX ADMIN — OXYCODONE HYDROCHLORIDE PRN MG: 5 TABLET ORAL at 03:42

## 2017-09-21 RX ADMIN — LEVALBUTEROL SCH MG: 1.25 SOLUTION, CONCENTRATE RESPIRATORY (INHALATION) at 04:00

## 2017-09-21 RX ADMIN — BUDESONIDE AND FORMOTEROL FUMARATE DIHYDRATE SCH PUFFS: 160; 4.5 AEROSOL RESPIRATORY (INHALATION) at 17:13

## 2017-09-21 RX ADMIN — PANTOPRAZOLE SCH MG: 40 TABLET, DELAYED RELEASE ORAL at 08:13

## 2017-09-21 RX ADMIN — MONTELUKAST SODIUM SCH MG: 10 TABLET, FILM COATED ORAL at 21:24

## 2017-09-21 RX ADMIN — LOSARTAN POTASSIUM SCH MG: 50 TABLET, FILM COATED ORAL at 08:14

## 2017-09-21 RX ADMIN — Medication SCH MG: at 08:14

## 2017-09-21 RX ADMIN — OXYCODONE HYDROCHLORIDE PRN MG: 5 TABLET ORAL at 17:16

## 2017-09-21 RX ADMIN — NICOTINE SCH PATCH: 7 PATCH, EXTENDED RELEASE TRANSDERMAL at 08:15

## 2017-09-21 RX ADMIN — METOPROLOL TARTRATE SCH MG: 50 TABLET, FILM COATED ORAL at 17:14

## 2017-09-21 RX ADMIN — Medication SCH TAB: at 08:14

## 2017-09-21 RX ADMIN — IPRATROPIUM BROMIDE SCH MG: 0.5 SOLUTION RESPIRATORY (INHALATION) at 07:36

## 2017-09-21 RX ADMIN — IPRATROPIUM BROMIDE SCH MG: 0.5 SOLUTION RESPIRATORY (INHALATION) at 19:45

## 2017-09-21 RX ADMIN — LEVALBUTEROL SCH MG: 1.25 SOLUTION, CONCENTRATE RESPIRATORY (INHALATION) at 19:45

## 2017-09-21 RX ADMIN — RANITIDINE SCH MG: 150 TABLET ORAL at 21:24

## 2017-09-21 RX ADMIN — LEVOFLOXACIN SCH MG: 750 TABLET, FILM COATED ORAL at 11:15

## 2017-09-21 RX ADMIN — OXYCODONE HYDROCHLORIDE PRN MG: 5 TABLET ORAL at 08:14

## 2017-09-21 RX ADMIN — BUDESONIDE AND FORMOTEROL FUMARATE DIHYDRATE SCH PUFFS: 160; 4.5 AEROSOL RESPIRATORY (INHALATION) at 08:13

## 2017-09-21 RX ADMIN — GABAPENTIN SCH MG: 300 CAPSULE ORAL at 14:12

## 2017-09-21 RX ADMIN — OXYCODONE HYDROCHLORIDE PRN MG: 5 TABLET ORAL at 13:11

## 2017-09-21 NOTE — SURGERY PROGRESS NOTE
Subjective


Date of Service:  Sep 21, 2017.


Pt. denies CP or SOB.  She continues to ambulate.  Tolerating diet.





Objective


Vitals











  Date Time  Temp Pulse Resp B/P (MAP) Pulse Ox O2 Delivery O2 Flow Rate FiO2


 


9/21/17 07:52 36.6 62 16 112/68 (83) 97 Nasal Cannula 2.0 


 


9/21/17 07:39  82 18  91 Room Air  


 


9/20/17 23:34 37.0 101 18 126/77 (93) 92 Humidified Oxygen 2.0 


 


9/20/17 19:59  93 14  92 Room Air  


 


9/20/17 19:45      Humidified Oxygen 2.0 


 


9/20/17 15:25 36.6 99 20 114/72 (86) 94 Humidified Oxygen 2.0 


 


9/20/17 11:34  87 14  90 Room Air  











Physical Exam


General:  + well developed, + well nourished, 


   No distress


CV:  + RRR


Pulmonary:  + pertinent finding (crackles noted over posterior and anterior 

lung fields), 


   No accessory muscle use, No respiratory distress


Abdomen:  + non-distended, + non tender, + soft


Extremities:  No calf tenderness


Neurologic:  + alert & oriented x 3





Drains / Tubes


chest tube (minimal drainage; air leak noted )





Assessment & Plan


71 year old female s/p LVATS with excision of mass


-keep CT in place until air leak stops; maintain on suction while in room but 

ok to go to water seal when ambulating 





-continue to encourage use of IS and ambulation 





OTHER


lovenox for DVT prevention

## 2017-09-21 NOTE — PROGRESS NOTE
Internal Med Progress Note


Date of Service:


Sep 21, 2017.


Provider Documentation:


SUBJECTIVE:


Patient seen and examined while sitting on chair. denies shortness of breath or 

chests pain or pain at site of chest tube of lower left back. no acute 

complaints





OBJECTIVE:


Physical Exam:


General Appearance: no apparent distress


Head:  normocephalic, Atraumatic 


Eyes:  normal inspection, EOMI, PERRL


Neck:  supple, Trachea midline


Respiratory/Chest: crackles audible on auscultation of chest of anterior and 

posterior chest, chest tube


Cardiovascular: S1, S2, No murmur


Abdomen/GI:Soft, Non tender, Bowel sounds present


Extremities: bilateral lower extremity edema 


Neurologic/Psych:grossly no focal neurological deficits 


Skin:  normal color, warm


ASSESSMENT & PLAN:


Patient is a 71 yr female with H/O CAD/CABG, HTN, COPD presenting with left 

sided chest pain. Postoperative day #8 status post robotic resection of a


portion of the upper lobe with necrotic lung with and left chest tube





Left necrotic lung mass S/P robot-assisted left thoracoscopy with excision of a 

mass from 9/13/17


Frozen section: Negative for malignant cells, shows infectious process    


Continue Chest tube per CT surgery 


CT chest showed 30% left-sided pneumothorax, extensive subcutaneous emphysema 

over the left hemithorax and small L effusion with partial LLL atelectasis


As per recent CT surgery note 9/20/17:   Subcutaneous emphysema is better 

clinically, still has air leak, with plans to discharge patient once chest tube 

is taken out





s/p left necrotic lung mass removed elevated WBC


Transitioned from Primaxin IV to Levaquin PO on 9/20/17 as per infectious 

disease service and to keep this regimen for 21 days 


Blood/Sputum culture negative 


Bronchial cultures: rare yeast  





COPD


H/O chronic tobacco use 


Continue home inhalers   


counseled for smoking cessation, Nicotine patch





CAD/CABG


ASA, Plavix was held for thoracoscopic evaluation, will restart after chest 

tube removed 


continue Losartan, Metoprolol


hold Lasix for now 





Leg swelling:


S/P IV fluids following surgery


ECHO Grade I diastolic dysfunction however was technically limited


Continue lasix at 20mg AM for now (Takes Hyzaar at home)


Daily weight


Venous doppler: No DVT


encouraged to ambulate





Hyponatremia/Hypokalemia:


Resolved


monitor 





HTN


continue Losartan, Metoprolol


takes Hyzaar at home





DVT PX


Lovenox SQ





Code Status


FULL CODE 





DISPOSITION


anticipate d/c home when medically stable once cleared by CT surgery


follows with Family practice at [place] area 


would like to follow up with Pulmonology Dr Frazier in [place]


Vital Signs:











  Date Time  Temp Pulse Resp B/P (MAP) Pulse Ox O2 Delivery O2 Flow Rate FiO2


 


9/21/17 19:46  83 18  96 Nasal Cannula 2.0 


 


9/21/17 15:21  102 18  96 Nasal Cannula 2.0 


 


9/21/17 15:20 36.9 107 20 118/78 (91) 94 Humidified Oxygen 2.0 


 


9/21/17 11:57  97 15  96 Nasal Cannula 2.0 


 


9/21/17 11:22     93 Nasal Cannula 2.0 


 


9/21/17 11:20 36.6 90 16 120/76 (91) 93 Nasal Cannula 2.0 


 


9/21/17 07:52 36.6 62 16 112/68 (83) 97 Nasal Cannula 2.0 


 


9/21/17 07:50      Nasal Cannula 2.0 





      Humidified Oxygen  


 


9/21/17 07:39  82 18  91 Room Air  


 


9/20/17 23:34 37.0 101 18 126/77 (93) 92 Humidified Oxygen 2.0

## 2017-09-22 VITALS
HEART RATE: 97 BPM | TEMPERATURE: 98.6 F | DIASTOLIC BLOOD PRESSURE: 78 MMHG | OXYGEN SATURATION: 91 % | SYSTOLIC BLOOD PRESSURE: 148 MMHG

## 2017-09-22 VITALS
DIASTOLIC BLOOD PRESSURE: 81 MMHG | SYSTOLIC BLOOD PRESSURE: 125 MMHG | HEART RATE: 94 BPM | TEMPERATURE: 98.24 F | OXYGEN SATURATION: 91 %

## 2017-09-22 VITALS — OXYGEN SATURATION: 92 %

## 2017-09-22 VITALS
TEMPERATURE: 98.24 F | OXYGEN SATURATION: 96 % | HEART RATE: 92 BPM | SYSTOLIC BLOOD PRESSURE: 130 MMHG | DIASTOLIC BLOOD PRESSURE: 79 MMHG

## 2017-09-22 VITALS — DIASTOLIC BLOOD PRESSURE: 81 MMHG | SYSTOLIC BLOOD PRESSURE: 125 MMHG | HEART RATE: 92 BPM

## 2017-09-22 VITALS — OXYGEN SATURATION: 92 % | HEART RATE: 84 BPM

## 2017-09-22 VITALS — OXYGEN SATURATION: 97 % | HEART RATE: 71 BPM

## 2017-09-22 VITALS
HEART RATE: 70 BPM | DIASTOLIC BLOOD PRESSURE: 85 MMHG | TEMPERATURE: 98.24 F | SYSTOLIC BLOOD PRESSURE: 162 MMHG | OXYGEN SATURATION: 91 %

## 2017-09-22 VITALS — OXYGEN SATURATION: 94 %

## 2017-09-22 VITALS — HEART RATE: 88 BPM | OXYGEN SATURATION: 92 %

## 2017-09-22 RX ADMIN — IPRATROPIUM BROMIDE SCH MG: 0.5 SOLUTION RESPIRATORY (INHALATION) at 07:17

## 2017-09-22 RX ADMIN — IPRATROPIUM BROMIDE SCH MG: 0.5 SOLUTION RESPIRATORY (INHALATION) at 11:10

## 2017-09-22 RX ADMIN — OXYCODONE HYDROCHLORIDE PRN MG: 5 TABLET ORAL at 01:30

## 2017-09-22 RX ADMIN — IPRATROPIUM BROMIDE SCH MG: 0.5 SOLUTION RESPIRATORY (INHALATION) at 19:48

## 2017-09-22 RX ADMIN — METOPROLOL TARTRATE SCH MG: 50 TABLET, FILM COATED ORAL at 16:19

## 2017-09-22 RX ADMIN — Medication SCH MG: at 21:09

## 2017-09-22 RX ADMIN — IPRATROPIUM BROMIDE SCH MG: 0.5 SOLUTION RESPIRATORY (INHALATION) at 00:00

## 2017-09-22 RX ADMIN — LEVALBUTEROL SCH MG: 1.25 SOLUTION, CONCENTRATE RESPIRATORY (INHALATION) at 19:48

## 2017-09-22 RX ADMIN — PANTOPRAZOLE SCH MG: 40 TABLET, DELAYED RELEASE ORAL at 08:21

## 2017-09-22 RX ADMIN — Medication SCH TAB: at 08:21

## 2017-09-22 RX ADMIN — OXYCODONE HYDROCHLORIDE PRN MG: 5 TABLET ORAL at 12:38

## 2017-09-22 RX ADMIN — ENOXAPARIN SODIUM SCH MG: 40 INJECTION SUBCUTANEOUS at 08:23

## 2017-09-22 RX ADMIN — IPRATROPIUM BROMIDE SCH MG: 0.5 SOLUTION RESPIRATORY (INHALATION) at 14:26

## 2017-09-22 RX ADMIN — BUDESONIDE AND FORMOTEROL FUMARATE DIHYDRATE SCH PUFFS: 160; 4.5 AEROSOL RESPIRATORY (INHALATION) at 16:57

## 2017-09-22 RX ADMIN — LEVOFLOXACIN SCH MG: 750 TABLET, FILM COATED ORAL at 11:02

## 2017-09-22 RX ADMIN — GABAPENTIN SCH MG: 300 CAPSULE ORAL at 21:08

## 2017-09-22 RX ADMIN — MONTELUKAST SODIUM SCH MG: 10 TABLET, FILM COATED ORAL at 21:09

## 2017-09-22 RX ADMIN — BUDESONIDE AND FORMOTEROL FUMARATE DIHYDRATE SCH PUFFS: 160; 4.5 AEROSOL RESPIRATORY (INHALATION) at 08:18

## 2017-09-22 RX ADMIN — DOCUSATE SODIUM SCH MG: 100 CAPSULE, LIQUID FILLED ORAL at 08:20

## 2017-09-22 RX ADMIN — LEVALBUTEROL SCH MG: 1.25 SOLUTION, CONCENTRATE RESPIRATORY (INHALATION) at 00:00

## 2017-09-22 RX ADMIN — GABAPENTIN SCH MG: 300 CAPSULE ORAL at 08:19

## 2017-09-22 RX ADMIN — RANITIDINE SCH MG: 150 TABLET ORAL at 21:08

## 2017-09-22 RX ADMIN — LEVALBUTEROL SCH MG: 1.25 SOLUTION, CONCENTRATE RESPIRATORY (INHALATION) at 23:04

## 2017-09-22 RX ADMIN — TRAMADOL HYDROCHLORIDE PRN MG: 50 TABLET, COATED ORAL at 15:13

## 2017-09-22 RX ADMIN — NICOTINE SCH PATCH: 7 PATCH, EXTENDED RELEASE TRANSDERMAL at 08:22

## 2017-09-22 RX ADMIN — LEVALBUTEROL SCH MG: 1.25 SOLUTION, CONCENTRATE RESPIRATORY (INHALATION) at 14:26

## 2017-09-22 RX ADMIN — OXYCODONE HYDROCHLORIDE PRN MG: 5 TABLET ORAL at 16:56

## 2017-09-22 RX ADMIN — LEVALBUTEROL SCH MG: 1.25 SOLUTION, CONCENTRATE RESPIRATORY (INHALATION) at 07:17

## 2017-09-22 RX ADMIN — Medication SCH TAB: at 08:20

## 2017-09-22 RX ADMIN — GABAPENTIN SCH MG: 300 CAPSULE ORAL at 14:33

## 2017-09-22 RX ADMIN — ATORVASTATIN CALCIUM SCH MG: 10 TABLET, FILM COATED ORAL at 21:09

## 2017-09-22 RX ADMIN — FUROSEMIDE SCH MG: 20 TABLET ORAL at 08:20

## 2017-09-22 RX ADMIN — LOSARTAN POTASSIUM SCH MG: 50 TABLET, FILM COATED ORAL at 08:19

## 2017-09-22 RX ADMIN — Medication SCH MG: at 08:21

## 2017-09-22 RX ADMIN — IPRATROPIUM BROMIDE SCH MG: 0.5 SOLUTION RESPIRATORY (INHALATION) at 23:04

## 2017-09-22 RX ADMIN — METOPROLOL TARTRATE SCH MG: 50 TABLET, FILM COATED ORAL at 06:23

## 2017-09-22 RX ADMIN — OXYCODONE HYDROCHLORIDE PRN MG: 5 TABLET ORAL at 06:22

## 2017-09-22 RX ADMIN — OXYCODONE HYDROCHLORIDE PRN MG: 5 TABLET ORAL at 21:09

## 2017-09-22 RX ADMIN — LEVALBUTEROL SCH MG: 1.25 SOLUTION, CONCENTRATE RESPIRATORY (INHALATION) at 11:10

## 2017-09-22 NOTE — PROGRESS NOTE
Internal Med Progress Note


Date of Service:


Sep 22, 2017.


Provider Documentation:


SUBJECTIVE:


Patient seen and examined while sitting on chair. denies shortness of breath or 

chests pain or pain at site of chest tube of lower left back. no acute 

complaints





OBJECTIVE:


Physical Exam:


General Appearance: no apparent distress


Head:  normocephalic, Atraumatic 


Eyes:  normal inspection, EOMI, PERRL


Neck:  supple, Trachea midline


Respiratory/Chest: crackles audible on auscultation of chest of anterior and 

posterior chest, chest tube


Cardiovascular: S1, S2, No murmur


Abdomen/GI:Soft, Non tender, Bowel sounds present


Extremities: bilateral lower extremity edema 


Neurologic/Psych:grossly no focal neurological deficits 


Skin:  normal color, warm


ASSESSMENT & PLAN:


Patient is a 71 yr female with H/O CAD/CABG, HTN, COPD presenting with left 

sided chest pain. Postoperative day #9 status post robotic resection of a


portion of the upper lobe with necrotic lung with and left chest tube





Left necrotic lung mass S/P robot-assisted left thoracoscopy with excision of a 

mass from 9/13/17


Frozen section: Negative for malignant cells, shows infectious process    


Continue Chest tube per CT surgery 


CT chest showed 30% left-sided pneumothorax, extensive subcutaneous emphysema 

over the left hemithorax and small L effusion with partial LLL atelectasis


As per CT surgery note 9/20/17:   Subcutaneous emphysema is better clinically, 

still has air leak, with plans to discharge patient once chest tube is taken out


As per CT surgery note: 9/22/17: air leak has now stopped; keep chest tube in 

another 24-48 hours





s/p left necrotic lung mass removed elevated WBC


Transitioned from Primaxin IV to Levaquin PO on 9/20/17 as per infectious 

disease service and to keep this regimen for 21 days 


Blood/Sputum culture negative 


Bronchial cultures: rare yeast  





COPD


H/O chronic tobacco use 


Continue home inhalers   


counseled for smoking cessation, Nicotine patch





CAD/CABG


ASA, Plavix was held for thoracoscopic evaluation, will restart after chest 

tube removed 


continue Losartan, Metoprolol


hold Lasix for now 





Leg swelling:


S/P IV fluids following surgery


ECHO Grade I diastolic dysfunction however was technically limited


Continue lasix at 20mg AM for now (Takes Hyzaar at home)


Daily weight


Venous doppler: No DVT


encouraged to ambulate





Hyponatremia/Hypokalemia:


Resolved


monitor 





HTN


continue Losartan, Metoprolol


takes Hyzaar at home





DVT PX


Lovenox SQ





Code Status


FULL CODE 





DISPOSITION


anticipate d/c home when medically stable once cleared by CT surgery


follows with Family practice at [place] area 


would like to follow up with Pulmonology Dr Frazier in [place]


Vital Signs:











  Date Time  Temp Pulse Resp B/P (MAP) Pulse Ox O2 Delivery O2 Flow Rate FiO2


 


9/22/17 15:17 36.8 94 18 125/81 (96) 91 Room Air  


 


9/22/17 11:15  88 16  92 Room Air  


 


9/22/17 08:35 36.8 70 18 162/85 (110) 91 Room Air  


 


9/22/17 08:30     94 Room Air  


 


9/22/17 08:20      Room Air  


 


9/22/17 07:19  71 16  97 Nasal Cannula 2.0 


 


9/22/17 06:25  92  125/81 (96)    


 


9/22/17 03:24 36.8 92 16 130/79 (96) 96 Nasal Cannula 2.0 


 


9/22/17 00:23      Nasal Cannula 2.0 


 


9/21/17 23:20 36.6 101 16 148/84 (105) 95 Nasal Cannula 2.0 





      Humidified Oxygen  


 


9/21/17 19:46  83 18  96 Nasal Cannula 2.0

## 2017-09-22 NOTE — DIAGNOSTIC IMAGING REPORT
CHEST 2 VIEWS ROUTINE



CLINICAL HISTORY: pneumothorax    



COMPARISON STUDY:  9/18/2017



FINDINGS: The cardiac and mediastinal contours remain stable. There are

postsurgical changes of a midline sternotomy. The left-sided chest tube is

unchanged in position. There is extensive subcutaneous emphysema similar to the

preceding study. There is no definite focal pulmonary consolidation however

parenchymal evaluation is difficult given the extensive subcutaneous emphysema[ 



IMPRESSION: Persistent extensive subcutaneous emphysema. No change in the

position left-sided chest tube.







Electronically signed by:  Hebert Wolf M.D.

9/22/2017 2:59 PM



Dictated Date/Time:  9/22/2017 2:57 PM

## 2017-09-22 NOTE — SURGERY PROGRESS NOTE
Subjective


Date of Service:  Sep 22, 2017.


Pt. denies SOB.  No CP.  No N/V--she is tolerating oral intake.





Objective


Vitals











  Date Time  Temp Pulse Resp B/P (MAP) Pulse Ox O2 Delivery O2 Flow Rate FiO2


 


9/22/17 08:35 36.8 70 18 162/85 (110) 91 Room Air  


 


9/22/17 08:30     94 Room Air  


 


9/22/17 08:20      Room Air  


 


9/22/17 07:19  71 16  97 Nasal Cannula 2.0 


 


9/22/17 06:25  92  125/81 (96)    


 


9/22/17 03:24 36.8 92 16 130/79 (96) 96 Nasal Cannula 2.0 


 


9/22/17 00:23      Nasal Cannula 2.0 


 


9/21/17 23:20 36.6 101 16 148/84 (105) 95 Nasal Cannula 2.0 





      Humidified Oxygen  


 


9/21/17 19:46  83 18  96 Nasal Cannula 2.0 


 


9/21/17 17:00      Nasal Cannula 2.0 


 


9/21/17 15:21  102 18  96 Nasal Cannula 2.0 


 


9/21/17 15:20 36.9 107 20 118/78 (91) 94 Humidified Oxygen 2.0 


 


9/21/17 11:57  97 15  96 Nasal Cannula 2.0 


 


9/21/17 11:22     93 Nasal Cannula 2.0 


 


9/21/17 11:20 36.6 90 16 120/76 (91) 93 Nasal Cannula 2.0 











Physical Exam


General:  + well developed, + well nourished, 


   No distress


CV:  + RRR


Pulmonary:  + pertinent finding (crackles noted over A/P lung fields; crepitus 

noted in neck/back/anterior chest as before ), 


   No accessory muscle use, No respiratory distress


Neurologic:  + alert & oriented x 3





Drains / Tubes


chest tube (no air leak noted this am )





Assessment & Plan


71 year old female s/p LVATS with excision of mass


-air leak has now stopped; keep chest tube in another 24-48 hours


-will check CXR today 





-continue to encourage use of IS and ambulation 





OTHER


lovenox for DVT prevention

## 2017-09-23 VITALS — HEART RATE: 90 BPM | SYSTOLIC BLOOD PRESSURE: 114 MMHG | DIASTOLIC BLOOD PRESSURE: 57 MMHG

## 2017-09-23 VITALS
SYSTOLIC BLOOD PRESSURE: 121 MMHG | TEMPERATURE: 98.6 F | HEART RATE: 98 BPM | OXYGEN SATURATION: 94 % | DIASTOLIC BLOOD PRESSURE: 80 MMHG

## 2017-09-23 VITALS — OXYGEN SATURATION: 90 % | HEART RATE: 89 BPM

## 2017-09-23 VITALS
HEART RATE: 88 BPM | DIASTOLIC BLOOD PRESSURE: 81 MMHG | TEMPERATURE: 98.06 F | SYSTOLIC BLOOD PRESSURE: 138 MMHG | OXYGEN SATURATION: 91 %

## 2017-09-23 VITALS
HEART RATE: 100 BPM | TEMPERATURE: 98.24 F | OXYGEN SATURATION: 93 % | DIASTOLIC BLOOD PRESSURE: 74 MMHG | SYSTOLIC BLOOD PRESSURE: 122 MMHG

## 2017-09-23 VITALS — OXYGEN SATURATION: 93 % | HEART RATE: 82 BPM

## 2017-09-23 LAB
ALBUMIN/GLOB SERPL: 0.7 {RATIO} (ref 0.9–2)
ALP SERPL-CCNC: 87 U/L (ref 45–117)
ALT SERPL-CCNC: 13 U/L (ref 12–78)
ANION GAP SERPL CALC-SCNC: 12 MMOL/L (ref 3–11)
AST SERPL-CCNC: 15 U/L (ref 15–37)
BASOPHILS # BLD: 0.04 K/UL (ref 0–0.2)
BASOPHILS NFR BLD: 0.3 %
BUN SERPL-MCNC: 4 MG/DL (ref 7–18)
BUN/CREAT SERPL: 5.5 (ref 10–20)
CALCIUM SERPL-MCNC: 8.9 MG/DL (ref 8.5–10.1)
CHLORIDE SERPL-SCNC: 100 MMOL/L (ref 98–107)
CO2 SERPL-SCNC: 28 MMOL/L (ref 21–32)
COMPLETE: YES
CREAT CL PREDICTED SERPL C-G-VRATE: 70.8 ML/MIN
CREAT SERPL-MCNC: 0.78 MG/DL (ref 0.6–1.2)
EOSINOPHIL NFR BLD AUTO: 532 K/UL (ref 130–400)
GLOBULIN SER-MCNC: 3.6 GM/DL (ref 2.5–4)
GLUCOSE SERPL-MCNC: 104 MG/DL (ref 70–99)
HCT VFR BLD CALC: 32.2 % (ref 37–47)
IG%: 0.7 %
IMM GRANULOCYTES NFR BLD AUTO: 13.8 %
LYMPHOCYTES # BLD: 1.85 K/UL (ref 1.2–3.4)
MCH RBC QN AUTO: 30.5 PG (ref 25–34)
MCHC RBC AUTO-ENTMCNC: 32.9 G/DL (ref 32–36)
MCV RBC AUTO: 92.5 FL (ref 80–100)
MONOCYTES NFR BLD: 6.1 %
NEUTROPHILS # BLD AUTO: 0 %
NEUTROPHILS NFR BLD AUTO: 79.1 %
PMV BLD AUTO: 8.4 FL (ref 7.4–10.4)
POTASSIUM SERPL-SCNC: 3.4 MMOL/L (ref 3.5–5.1)
RBC # BLD AUTO: 3.48 M/UL (ref 4.2–5.4)
SODIUM SERPL-SCNC: 140 MMOL/L (ref 136–145)
WBC # BLD AUTO: 13.45 K/UL (ref 4.8–10.8)

## 2017-09-23 RX ADMIN — Medication SCH MG: at 08:16

## 2017-09-23 RX ADMIN — BUDESONIDE AND FORMOTEROL FUMARATE DIHYDRATE SCH PUFFS: 160; 4.5 AEROSOL RESPIRATORY (INHALATION) at 16:43

## 2017-09-23 RX ADMIN — GABAPENTIN SCH MG: 300 CAPSULE ORAL at 08:16

## 2017-09-23 RX ADMIN — BUDESONIDE AND FORMOTEROL FUMARATE DIHYDRATE SCH PUFFS: 160; 4.5 AEROSOL RESPIRATORY (INHALATION) at 08:14

## 2017-09-23 RX ADMIN — MONTELUKAST SODIUM SCH MG: 10 TABLET, FILM COATED ORAL at 20:30

## 2017-09-23 RX ADMIN — ATORVASTATIN CALCIUM SCH MG: 10 TABLET, FILM COATED ORAL at 20:28

## 2017-09-23 RX ADMIN — OXYCODONE HYDROCHLORIDE AND ACETAMINOPHEN PRN TAB: 5; 325 TABLET ORAL at 12:33

## 2017-09-23 RX ADMIN — IPRATROPIUM BROMIDE SCH MG: 0.5 SOLUTION RESPIRATORY (INHALATION) at 03:34

## 2017-09-23 RX ADMIN — PANTOPRAZOLE SCH MG: 40 TABLET, DELAYED RELEASE ORAL at 08:16

## 2017-09-23 RX ADMIN — Medication SCH MG: at 20:28

## 2017-09-23 RX ADMIN — GABAPENTIN SCH MG: 300 CAPSULE ORAL at 20:30

## 2017-09-23 RX ADMIN — LOSARTAN POTASSIUM SCH MG: 50 TABLET, FILM COATED ORAL at 08:16

## 2017-09-23 RX ADMIN — TRAMADOL HYDROCHLORIDE PRN MG: 50 TABLET, COATED ORAL at 10:55

## 2017-09-23 RX ADMIN — DOCUSATE SODIUM SCH MG: 100 CAPSULE, LIQUID FILLED ORAL at 08:16

## 2017-09-23 RX ADMIN — METOPROLOL TARTRATE SCH MG: 50 TABLET, FILM COATED ORAL at 16:43

## 2017-09-23 RX ADMIN — ENOXAPARIN SODIUM SCH MG: 40 INJECTION SUBCUTANEOUS at 08:15

## 2017-09-23 RX ADMIN — LEVOFLOXACIN SCH MG: 750 TABLET, FILM COATED ORAL at 08:17

## 2017-09-23 RX ADMIN — Medication SCH TAB: at 08:17

## 2017-09-23 RX ADMIN — LEVALBUTEROL SCH MG: 1.25 SOLUTION, CONCENTRATE RESPIRATORY (INHALATION) at 07:31

## 2017-09-23 RX ADMIN — Medication SCH TAB: at 08:15

## 2017-09-23 RX ADMIN — RANITIDINE SCH MG: 150 TABLET ORAL at 20:30

## 2017-09-23 RX ADMIN — OXYCODONE HYDROCHLORIDE AND ACETAMINOPHEN PRN TAB: 5; 325 TABLET ORAL at 20:27

## 2017-09-23 RX ADMIN — LEVALBUTEROL SCH MG: 1.25 SOLUTION, CONCENTRATE RESPIRATORY (INHALATION) at 11:20

## 2017-09-23 RX ADMIN — METOPROLOL TARTRATE SCH MG: 50 TABLET, FILM COATED ORAL at 06:13

## 2017-09-23 RX ADMIN — IPRATROPIUM BROMIDE SCH MG: 0.5 SOLUTION RESPIRATORY (INHALATION) at 11:20

## 2017-09-23 RX ADMIN — NICOTINE SCH PATCH: 7 PATCH, EXTENDED RELEASE TRANSDERMAL at 08:17

## 2017-09-23 RX ADMIN — FUROSEMIDE SCH MG: 20 TABLET ORAL at 08:16

## 2017-09-23 RX ADMIN — OXYCODONE HYDROCHLORIDE AND ACETAMINOPHEN PRN TAB: 5; 325 TABLET ORAL at 16:42

## 2017-09-23 RX ADMIN — OXYCODONE HYDROCHLORIDE PRN MG: 5 TABLET ORAL at 08:19

## 2017-09-23 RX ADMIN — GABAPENTIN SCH MG: 300 CAPSULE ORAL at 12:44

## 2017-09-23 RX ADMIN — OXYCODONE HYDROCHLORIDE PRN MG: 5 TABLET ORAL at 03:44

## 2017-09-23 RX ADMIN — IPRATROPIUM BROMIDE SCH MG: 0.5 SOLUTION RESPIRATORY (INHALATION) at 07:31

## 2017-09-23 RX ADMIN — LEVALBUTEROL SCH MG: 1.25 SOLUTION, CONCENTRATE RESPIRATORY (INHALATION) at 03:34

## 2017-09-23 NOTE — SURGERY PROGRESS NOTE
Subjective


Date of Service:  Sep 23, 2017.


Pt. notes no CP or SOB.  She continues to ambulate.  She is tolerating po 

intake.





Objective


Vitals











  Date Time  Temp Pulse Resp B/P (MAP) Pulse Ox O2 Delivery O2 Flow Rate FiO2


 


9/23/17 06:15  90  114/57 (76)    


 


9/22/17 23:30      Room Air  


 


9/22/17 23:04 37.0 97 18 148/78 (101) 91 Room Air  


 


9/22/17 22:15     92 Room Air  


 


9/22/17 19:48  84 16  92 Room Air  


 


9/22/17 15:17 36.8 94 18 125/81 (96) 91 Room Air  


 


9/22/17 11:15  88 16  92 Room Air  


 


9/22/17 08:35 36.8 70 18 162/85 (110) 91 Room Air  


 


9/22/17 08:30     94 Room Air  


 


9/22/17 08:20      Room Air  


 


9/22/17 07:19  71 16  97 Nasal Cannula 2.0 











Physical Exam


General:  + well developed, + well nourished, 


   No distress


CV:  + RRR


Pulmonary:  + lungs clear, + accessory muscle use, + respiratory distress


Extremities:  No calf tenderness


Neurologic:  + alert & oriented x 3


Additional Notes:


Sub-q air palpated in neck and to a lesser extent posterior and anterior chest 

walls





Radiology


CXR yesterday continues to show large amount of sub-q air; no pneumothorax noted





Drains / Tubes


chest tube (left sided; minimal drainage; no air leak )





Assessment & Plan


71 year old female s/p LVATS with excision of mass


-operative findings concerning for lung abscess


-ID input noted:


   -21 days of levaquin recommended 





-air leak in CT noted to have stopped on 9/21/17;


   -will consider removing in next 24-48 hours





-continue use of IS and ambulation 





OTHER


lovenox for DVT prevention

## 2017-09-23 NOTE — PROGRESS NOTE
Internal Med Progress Note


Date of Service:


Sep 23, 2017.


Provider Documentation:


SUBJECTIVE:


Patient seen and examined while sitting on chair. denies shortness of breath or 

chests pain or pain at site of chest tube of lower left back. reviewed opioid 

medications with patient in regards to chronic back pain not related to chest 

tube





OBJECTIVE:


Physical Exam:


General Appearance: no apparent distress


Head:  normocephalic, Atraumatic 


Eyes:  normal inspection, EOMI, PERRL


Neck:  supple, Trachea midline


Respiratory/Chest: crackles are still audible on auscultation of chest of 

anterior and posterior chest, chest tube


Cardiovascular: S1, S2, No murmur


Abdomen/GI:Soft, Non tender, Bowel sounds present


Extremities: bilateral lower extremity edema 


Neurologic/Psych:grossly no focal neurological deficits 


Skin:  normal color, warm


ASSESSMENT & PLAN:


Patient is a 71 yr female with H/O CAD/CABG, HTN, COPD presenting with left 

sided chest pain. Postoperative day #9 status post robotic resection of a


portion of the upper lobe with necrotic lung with and left chest tube





Left necrotic lung mass S/P robot-assisted left thoracoscopy with excision of a 

mass from 9/13/17


Frozen section: Negative for malignant cells, shows infectious process    


Continue Chest tube per CT surgery 


CT chest showed 30% left-sided pneumothorax, extensive subcutaneous emphysema 

over the left hemithorax and small L effusion with partial LLL atelectasis


As per CT surgery note 9/20/17:   Subcutaneous emphysema is better clinically, 

still has air leak, with plans to discharge patient once chest tube is taken out


As per CT surgery note: 9/22/17: air leak has now stopped; awaiting CT surgery 

to take out chest tube





s/p left necrotic lung mass removed elevated WBC


Transitioned from Primaxin IV to Levaquin PO on 9/20/17 as per infectious 

disease service and to keep this regimen for 21 days 


Blood/Sputum culture negative 


Bronchial cultures: rare yeast  





COPD


H/O chronic tobacco use 


Continue home inhalers   


counseled for smoking cessation, Nicotine patch





CAD/CABG


ASA, Plavix was held for thoracoscopic evaluation, will restart after chest 

tube removed 


continue Losartan, Metoprolol


hold Lasix for now 





Leg swelling:


S/P IV fluids following surgery


ECHO Grade I diastolic dysfunction however was technically limited


Continue lasix at 20mg AM for now (Takes Hyzaar at home)


Daily weight


Venous doppler: No DVT


encouraged to ambulate





Hyponatremia/Hypokalemia:


Resolved


monitor 





HTN


continue Losartan, Metoprolol


takes Hyzaar at home





Pain medications: have discontinued morphine and intermediate acting narcotics 

and switched to percocet prn





DVT PX


Lovenox SQ





Code Status


FULL CODE 





DISPOSITION


anticipate d/c home when medically stable once cleared by CT surgery


follows with Family practice at [place] area 


would like to follow up with Pulmonology Dr Frazier in [place]


Vital Signs:











  Date Time  Temp Pulse Resp B/P (MAP) Pulse Ox O2 Delivery O2 Flow Rate FiO2


 


9/23/17 15:31 36.8 100 18 122/74 (90) 93 Room Air  


 


9/23/17 11:20  89 16  90 Room Air  


 


9/23/17 08:00      Room Air  


 


9/23/17 07:34  82 16  93 Room Air  


 


9/23/17 07:26 37.0 98 16 121/80 (94) 94 Room Air  


 


9/23/17 06:15  90  114/57 (76)    


 


9/22/17 23:30      Room Air  


 


9/22/17 23:04 37.0 97 18 148/78 (101) 91 Room Air  


 


9/22/17 22:15     92 Room Air  


 


9/22/17 19:48  84 16  92 Room Air  








Lab Results:





Results Past 24 Hours








Test


  9/23/17


06:50 Range/Units


 


 


White Blood Count 13.45 4.8-10.8  K/uL


 


Red Blood Count 3.48 4.2-5.4  M/uL


 


Hemoglobin 10.6 12.0-16.0  g/dL


 


Hematocrit 32.2 37-47  %


 


Mean Corpuscular Volume 92.5   fL


 


Mean Corpuscular Hemoglobin 30.5 25-34  pg


 


Mean Corpuscular Hemoglobin


Concent 32.9


  32-36  g/dl


 


 


Platelet Count 532 130-400  K/uL


 


Mean Platelet Volume 8.4 7.4-10.4  fL


 


Neutrophils (%) (Auto) 79.1  %


 


Lymphocytes (%) (Auto) 13.8  %


 


Monocytes (%) (Auto) 6.1  %


 


Eosinophils (%) (Auto) 0.0  %


 


Basophils (%) (Auto) 0.3  %


 


Neutrophils # (Auto) 10.64 1.4-6.5  K/uL


 


Lymphocytes # (Auto) 1.85 1.2-3.4  K/uL


 


Monocytes # (Auto) 0.82 0.11-0.59  K/uL


 


Eosinophils # (Auto) 0.00 0-0.5  K/uL


 


Basophils # (Auto) 0.04 0-0.2  K/uL


 


RDW Standard Deviation 46.8 36.4-46.3  fL


 


RDW Coefficient of Variation 13.9 11.5-14.5  %


 


Immature Granulocyte % (Auto) 0.7  %


 


Immature Granulocyte # (Auto) 0.10 0.00-0.02  K/uL


 


Sodium Level 140 136-145  mmol/L


 


Potassium Level 3.4 3.5-5.1  mmol/L


 


Chloride Level 100   mmol/L


 


Carbon Dioxide Level 28 21-32  mmol/L


 


Anion Gap 12.0 3-11  mmol/L


 


Blood Urea Nitrogen 4 7-18  mg/dl


 


Creatinine


  0.78


  0.60-1.20


mg/dl


 


Est Creatinine Clear Calc


Drug Dose 70.8


   ml/min


 


 


Estimated GFR (


American) 89.3


   


 


 


Estimated GFR (Non-


American 77.0


   


 


 


BUN/Creatinine Ratio 5.5 10-20  


 


Random Glucose 104 70-99  mg/dl


 


Calcium Level 8.9 8.5-10.1  mg/dl


 


Total Bilirubin 0.3 0.2-1  mg/dl


 


Aspartate Amino Transf


(AST/SGOT) 15


  15-37  U/L


 


 


Alanine Aminotransferase


(ALT/SGPT) 13


  12-78  U/L


 


 


Alkaline Phosphatase 87   U/L


 


Total Protein 6.2 6.4-8.2  gm/dl


 


Albumin 2.6 3.4-5.0  gm/dl


 


Globulin 3.6 2.5-4.0  gm/dl


 


Albumin/Globulin Ratio 0.7 0.9-2

## 2017-09-24 VITALS
TEMPERATURE: 98.42 F | OXYGEN SATURATION: 92 % | DIASTOLIC BLOOD PRESSURE: 63 MMHG | HEART RATE: 66 BPM | SYSTOLIC BLOOD PRESSURE: 118 MMHG

## 2017-09-24 VITALS
OXYGEN SATURATION: 92 % | SYSTOLIC BLOOD PRESSURE: 118 MMHG | TEMPERATURE: 98.42 F | DIASTOLIC BLOOD PRESSURE: 63 MMHG | HEART RATE: 66 BPM

## 2017-09-24 VITALS — HEART RATE: 94 BPM | OXYGEN SATURATION: 90 % | SYSTOLIC BLOOD PRESSURE: 143 MMHG | DIASTOLIC BLOOD PRESSURE: 82 MMHG

## 2017-09-24 RX ADMIN — BUDESONIDE AND FORMOTEROL FUMARATE DIHYDRATE SCH PUFFS: 160; 4.5 AEROSOL RESPIRATORY (INHALATION) at 09:09

## 2017-09-24 RX ADMIN — Medication SCH TAB: at 09:19

## 2017-09-24 RX ADMIN — GABAPENTIN SCH MG: 300 CAPSULE ORAL at 09:18

## 2017-09-24 RX ADMIN — LORAZEPAM PRN MG: 1 TABLET ORAL at 09:10

## 2017-09-24 RX ADMIN — Medication SCH MG: at 09:10

## 2017-09-24 RX ADMIN — OXYCODONE HYDROCHLORIDE AND ACETAMINOPHEN PRN TAB: 5; 325 TABLET ORAL at 09:10

## 2017-09-24 RX ADMIN — DOCUSATE SODIUM SCH MG: 100 CAPSULE, LIQUID FILLED ORAL at 09:19

## 2017-09-24 RX ADMIN — OXYCODONE HYDROCHLORIDE AND ACETAMINOPHEN PRN TAB: 5; 325 TABLET ORAL at 05:28

## 2017-09-24 RX ADMIN — PANTOPRAZOLE SCH MG: 40 TABLET, DELAYED RELEASE ORAL at 09:18

## 2017-09-24 RX ADMIN — NICOTINE SCH PATCH: 7 PATCH, EXTENDED RELEASE TRANSDERMAL at 09:20

## 2017-09-24 RX ADMIN — OXYCODONE HYDROCHLORIDE AND ACETAMINOPHEN PRN TAB: 5; 325 TABLET ORAL at 16:31

## 2017-09-24 RX ADMIN — LEVOFLOXACIN SCH MG: 750 TABLET, FILM COATED ORAL at 12:43

## 2017-09-24 RX ADMIN — OXYCODONE HYDROCHLORIDE AND ACETAMINOPHEN PRN TAB: 5; 325 TABLET ORAL at 00:37

## 2017-09-24 RX ADMIN — BUDESONIDE AND FORMOTEROL FUMARATE DIHYDRATE SCH PUFFS: 160; 4.5 AEROSOL RESPIRATORY (INHALATION) at 16:31

## 2017-09-24 RX ADMIN — ENOXAPARIN SODIUM SCH MG: 40 INJECTION SUBCUTANEOUS at 09:20

## 2017-09-24 RX ADMIN — OXYCODONE HYDROCHLORIDE AND ACETAMINOPHEN PRN TAB: 5; 325 TABLET ORAL at 12:43

## 2017-09-24 RX ADMIN — Medication SCH TAB: at 09:18

## 2017-09-24 RX ADMIN — LOSARTAN POTASSIUM SCH MG: 50 TABLET, FILM COATED ORAL at 09:19

## 2017-09-24 RX ADMIN — GABAPENTIN SCH MG: 300 CAPSULE ORAL at 12:43

## 2017-09-24 RX ADMIN — METOPROLOL TARTRATE SCH MG: 50 TABLET, FILM COATED ORAL at 15:52

## 2017-09-24 RX ADMIN — METOPROLOL TARTRATE SCH MG: 50 TABLET, FILM COATED ORAL at 05:27

## 2017-09-24 NOTE — PROGRESS NOTE
Internal Med Progress Note


Date of Service:


Sep 24, 2017.


Provider Documentation:


SUBJECTIVE:


s/p chest tube removal today by Ct surgery. patient is comfortable. breathing 

on room air. no shortness of breath. no pain. patient reports feet itch and has 

some mild redness and erythema of posterior heel bilaterally





OBJECTIVE:


Physical Exam:


General Appearance: no apparent distress


Head:  normocephalic, atraumatic 


Eyes:  normal inspection, EOMI, PERRL


Neck:  supple, Trachea midline


Respiratory/Chest: good air entry, minimal crackles


Cardiovascular: S1, S2, No murmur


Abdomen/GI:Soft, Non tender, Bowel sounds present


Extremities: bilateral lower extremity edema 


Neurologic/Psych:grossly no focal neurological deficits 


Skin:  so warm of posterior heel bilaterally and mild swelling and erythema


ASSESSMENT & PLAN:


Patient is a 71 yr female with H/O CAD/CABG, HTN, COPD presenting with left 

sided chest pain. status post robotic resection of a portion of the upper lobe 

with necrotic lung with and left chest tube





Left necrotic lung mass S/P robot-assisted left thoracoscopy with excision of a 

mass from 9/13/17


Frozen section: Negative for malignant cells, shows infectious process    


CT chest showed 30% left-sided pneumothorax, extensive subcutaneous emphysema 

over the left hemithorax and small L effusion with partial LLL atelectasis


s/p chest tube removal on 9/24/2017





s/p left necrotic lung mass removed elevated WBC


Transitioned from Primaxin IV to Levaquin PO on 9/20/17 as per infectious 

disease service and to keep this regimen for 21 days 


Blood/Sputum culture negative 


Bronchial cultures: rare yeast  





COPD


H/O chronic tobacco use 


Continue home inhalers   


counseled for smoking cessation, Nicotine patch





CAD/CABG


ASA, Plavix to be restarted after chest tube removed


continue Losartan, Metoprolol


ECHO Grade I diastolic dysfunction however was technically limited


hold Lasix for now because of hypokalemia, and because patient does not use 

Lasix for CHF





Leg swelling:


S/P IV fluids following surgery


swelling has decreased after Lasix for leg edema, was then switched to HCTZ but 

may have some skin reaction of lower extremities versus skin irritation from 

being in tight stockings for reducing leg swelling





HTN


continue Losartan





Pain medications: have been taking perocet as home for chronic back pain





DVT PX


Lovenox SQ as inpatient





Code Status


FULL CODE 





DISPOSITION


d/c home


follows with Family practice at [place] area with a nurse practicer, the 

physician listed on our hospital records at [facility] is Dr. Heather Cheney 

[phone]


if patient prefers a [facility] associated physican she can call [phone]


patient to also follow up with Dr. Macias of CT surgery who was involved with 

her procedure who is associated with [facility] group


can also follow up with Pulmonology Dr Frazier of [facility] group in 

[facility] appointments can be made by calling [phone]





Patient should seek immediate medical attention after discharge if she has 

severe chest pain, shortness of breath, coughing up blood, high fever, vomiting


Vital Signs:











  Date Time  Temp Pulse Resp B/P (MAP) Pulse Ox O2 Delivery O2 Flow Rate FiO2


 


9/24/17 10:27      Room Air  


 


9/24/17 07:32 36.9 66 17 118/63 (81) 92 Room Air  


 


9/24/17 05:29  94  143/82 (102) 90 Room Air  


 


9/23/17 23:45      Room Air  


 


9/23/17 23:00 36.7 88 19 138/81 (100) 91 Room Air

## 2017-09-24 NOTE — SURGERY PROGRESS NOTE
DATE: 09/24/2017

 

SUBJECTIVE:  Mr. Gonzalez was seen today on 09/24/2017.  Her subcutaneous

emphysema has improved clinically.  She has no air leak and has not had one

for about 2 days.  I removed her chest tube.  Her x-ray after I removed it

showed no evidence of pneumothorax or infiltrate.  We see no yeast AFB or

growth from her abscess or the bronchial washings grew out a Candida species.

 The patient looks very good.  She is on room air with 92% saturations and

vital signs are stable.  Her incisions are clean.  I removed the sutures from

her chest tube site as well as the chest tube that was placed earlier.  All

of her incisions are clean.  All in all, I think she looks quite good.  From

our standpoint, she may be discharged when the primary service agrees.

 

She does have 1+ edema of her pedal area with some mild erythema and

pruritus, which is unusual.  I do not see a rash per se, although she has

mild erythema.  She has good pulses.  This was not a vascular issue.  She

also complains of pruritus only in her skin.  The patient does have a remote

history of having had a reaction to sulfa many, many years ago.  She has been

started back on hydrochlorothiazide, I am not sure if this is related.  At

any rate our standpoint, she has done very well.

## 2017-09-24 NOTE — DISCHARGE SUMMARY
Discharge Summary


Date of Service


Sep 24, 2017.





Discharge Summary


Admission Date:


Sep 10, 2017 at 18:59


Discharge Date:  Sep 24, 2017


Discharge Disposition:  Home


Principal Diagnosis:


lung necrotic mass, lung infection, pneumonectomy, chest tube, HTN


Procedures:


Left Video Assisted Thoracoscopy Robot Asisst with Bronchoscopy


Consultations:


pulmonary, CT surgery





Medication Reconciliation


New Medications:  


Levofloxacin (Levofloxacin) 750 Mg Tab


750 MG PO DAILY@11 for 17 Days, #17 TAB





[Triamcinolone Acet 0.025%] ()  CR


1 APPLN EXT BID for 5 Days, #1 TUBE 1 Refill





 


Continued Medications:  


Albuterol (Proair Hfa)  Aers


2 PUFFS INH Q4 PRN for SOB/Wheezing





Alprazolam (Alprazolam) 0.5 Mg Tab


0.5 MG PO HS PRN for Anxiety





Amlodipine (Norvasc) 2.5 Mg Tab


2.5 MG PO QAM, TAB





Aspirin (Aspir-81) 81 Mg Tab


81 MG PO HS





Atorvastatin (Lipitor) 10 Mg Tab


10 MG PO HS, TAB





Budesonide/Formoterol Fumarate (Symbicort 160/4.5 Inhaler )  Aero


2 PUFFS INH BID, INHALER





Carisoprodol (Soma) 350 Mg Tab


350 MG PO TID PRN for Muscle Spasms, TAB





Cholecalciferol (Vitamin D3) 2,000 Unit Tab


1 TAB PO DAILY for 30 Days, #30 TAB 5 Refills





Clopidogrel (Plavix) 75 Mg Tab


75 MG PO QAM, TAB





Docusate Sodium (Stool Softener) 100 Mg Cap


200 MG PO DAILY





Esomeprazole Magnesium (Nexium) 40 Mg Capcr


40 MG PO DAILY, CAP





Gabapentin (Gabapentin) 300 Mg Cap


1 CAP PO QID for 30 Days, #120 CAP 5 Refills





Ipratropium Bromide (Ipratropium Bromide) 0.5 Mg/2.5 Ml Nebu


1 VIAL NEB QID for 30 Days, #300 ML 5 Refills





Losartan Potassium (Cozaar) 100 Mg Tab


100 MG PO QAM, TAB





Magnesium Oxide (Mag-Ox) 400 Mg Tab


400 MG PO BID, TAB





Metoprolol Tartrate (Lopressor) (Lopressor) 50 Mg Tab


50 MG PO BID, TAB





Montelukast Sodium (Singulair) 10 Mg Tab


10 MG PO HS, TAB





Multivitamin (Multivitamin)  Tab


1 TAB PO QAM, TAB





Oxycodone HCl (Oxycodone HCl) 5 Mg Tab


5 MG PO Q4H PRN for moderate pain (pain scale 4-6), #90 TAB





Ranitidine (Zantac) 150 Mg Tab


150 MG PO HS, TAB





Tiotropium Bromide (Spiriva Respimat) 1.25 Mcg/Act Aer


2 PUFF INH, INHALER











Referrals At Discharge


Follow up Referrals:  


Pulmonologist Referral - Please Call For Appointment with Silas Frazier MD








Admission Information


HPI (per Admitting provider):


71 year old female with history of CAD/CABG, HTN, COPD presenting with left 

sided chest pain.





Patient was at her usual state of health until a few weeks ago when she started 

to have dry cough.


She was then prescribed by her PCP a course of Prednisone and Azithromycin. As 

per patient, her symptoms have improved since then


but then had increasing cough the past few days.


Last night, the patient was coughing and suddenly had left sided chest pain - 

felt a like a pulled muscle so she applied Aspercream with some improvement.


This morning the pain and cough persisted prompting consult at the [facility].


She was found to have by CXR a left sided pneumothorax with left loculated 

effusion.


A chest tube was placed and patient requested transfer to [facility].





Patient received with stable VS, more than 90% on 2 liters nasal cannula.


Repeat CXR showed :  Left-sided chest tube placement with the tube possibly 

outside of


the chest wall cavity in the left. Kink of the tube at its superior margin.


Considerable subcutaneous emphysematous change. Parenchymal infiltrate left


base. This tube should be repositioned. 





On exam, patient seen resting in bed, not in respiratory distress, no accessory 

muscles, speaks in sentences with no effort.


Main symptom is pain on the chest tube insertion site.


Denies other symptoms


Physical Exam (per Admitting):  


   General Appearance:  WD/WN, no apparent distress


   Head:  normocephalic, atraumatic


   Eyes:  normal inspection, EOMI, sclerae normal


   ENT:  normal ENT inspection, hearing grossly normal, pharynx normal


   Neck:  supple, no adenopathy, thyroid normal, no JVD, trachea midline


   Respiratory/Chest:  + pertinent finding ((+) chest tube inserted on the left 

chest wall, no signs of active bleeding; (+) mild wheeze and rales bilateral 

bases right >left)


   Cardiovascular:  regular rate, rhythm, no edema, no JVD, no murmur


   Abdomen/GI:  normal bowel sounds, non tender, soft, no organomegaly


   Back:  normal inspection, no CVA tenderness


   Extremities/Musculoskelatal:  normal inspection, no calf tenderness, normal 

capillary refill, no pedal edema


   Neurologic/Psych:  CNs II-XII nml as tested, no motor/sensory deficits, alert

, normal mood/affect, oriented x 3


   Skin:  normal color, warm/dry, no rash


   Lymphatic:  no adenopathy





Hospital Course


Patient is a 71 yr female with H/O CAD/CABG, HTN, COPD presenting with left 

sided chest pain. status post robotic resection of a portion of the upper lobe 

with necrotic lung with and left chest tube





Left necrotic lung mass S/P robot-assisted left thoracoscopy with excision of a 

mass from 9/13/17


Frozen section: Negative for malignant cells, shows infectious process    


CT chest showed 30% left-sided pneumothorax, extensive subcutaneous emphysema 

over the left hemithorax and small L effusion with partial LLL atelectasis


s/p chest tube removal on 9/24/2017





s/p left necrotic lung mass removed elevated WBC


Transitioned from Primaxin IV to Levaquin PO on 9/20/17 as per infectious 

disease service and to keep this regimen for 21 days 


Blood/Sputum culture negative 


Bronchial cultures: rare yeast  





COPD


H/O chronic tobacco use 


Continue home inhalers   


counseled for smoking cessation, Nicotine patch





CAD/CABG


ASA, Plavix to be restarted after chest tube removed


continue Losartan, Metoprolol


ECHO Grade I diastolic dysfunction however was technically limited


hold Lasix for now because of hypokalemia, and because patient does not use 

Lasix for CHF





Leg swelling:


S/P IV fluids following surgery


swelling has decreased after Lasix for leg edema, was then switched to HCTZ but 

may have some skin reaction of lower extremities versus skin irritation from 

being in tight stockings for reducing leg swelling





HTN


continue Losartan





Pain medications: have been taking perocet as home for chronic back pain





DVT PX


Lovenox SQ as inpatient





Code Status


FULL CODE 





DISPOSITION


d/c home


follows with Family practice at [facility] with a nurse practicer, the 

physician listed on our hospital records at [facility] is Dr. Heather Cheney 

[phone]


if patient prefers a [employer] associated physican she can call [phone]


patient to also follow up with Dr. Macias of CT surgery who was involved with 

her procedure who is associated with [facility]


can also follow up with Pulmonology Dr rFazier of [facility] in 

Psychiatric appointments can be made by calling [phone]





Patient should seek immediate medical attention after discharge if she has 

severe chest pain, shortness of breath, coughing up blood, high fever, vomiting


Total time spent on discharge = 


This includes examination of the patient, discharge planning, medication 

reconciliation, and communication with other providers.





Discharge Instructions


DISPOSITION


d/c home


follows with Family practice at [facility] with a nurse practicer, the 

physician listed on our hospital records at [facility] is Dr. Heather Cheney 

[phone]


if patient prefers a [employer] associated physican she can call [phone]


patient to also follow up with Dr. Macias of CT surgery who was involved with 

her procedure who is associated with [facility]


can also follow up with Pulmonology Dr Frazier of [facility] in 

Psychiatric appointments can be made by calling [phone]





Patient should seek immediate medical attention after discharge if she has 

severe chest pain, shortness of breath, coughing up blood, high fever, vomiting

## 2017-09-24 NOTE — DIAGNOSTIC IMAGING REPORT
CHEST ONE VIEW PORTABLE



CLINICAL HISTORY: chest tube removal dyspnea



COMPARISON STUDY:  9/22/2017



FINDINGS: Interval removal of the patient's left-sided chest tube. Subcutaneous

emphysema persists but is slightly improved. Diffuse parenchymal change

bilaterally essentially unchanged.



IMPRESSION:  No significant pneumothorax status post left-sided chest tube

removal 











The above report was generated using voice recognition software.  It may contain

grammatical, syntax or spelling errors.









Electronically signed by:  Brice Oreilly M.D.

9/24/2017 9:37 AM



Dictated Date/Time:  9/24/2017 9:36 AM

## 2017-09-24 NOTE — DISCHARGE INSTRUCTIONS
Discharge Instructions


Date of Service


Sep 24, 2017.





Admission


Reason for Admission:  Pneumothorax, Chest Tube, L Lung Absess





Discharge


Discharge Diagnosis / Problem:  lung necrotic mass, lung infection, 

pneumonectomy, chest tube, HTN





Discharge Goals


Goal(s):  Improve function





Activity Recommendations


Activity Limitations:  per Instructions/Follow-up section


Lifting Limitations:  until after follow-up appointment


Exercise/Sports Limitations:  until after follow-up appointment





.





Instructions / Follow-Up


Instructions / Follow-Up


DISPOSITION


d/c home


follows with Family practice at [facility] with a nurse practicer, the 

physician listed on our hospital records at [facility] is Dr. Heather Cheney 

[phone]


if patient prefers a [employer] associated physican she can call [phone]


patient to also follow up with Dr. Macias of CT surgery who was involved with 

her procedure who is associated with [facility] group


can also follow up with Pulmonology Dr Frazier of [facility] group in 

[facility] appointments can be made by calling [phone]





Patient should seek immediate medical attention after discharge if she has 

severe chest pain, shortness of breath, coughing up blood, high fever, vomiting





Current Hospital Diet


Patient's current hospital diet: AHA Diet (Heart Healthy)





Discharge Diet


Recommended Diet:  AHA Diet (Heart Healthy)





Procedures


Procedures Performed:  


Left Video Assisted Thoracoscopy Robot Asisst with Bronchoscopy





Pending Studies


Studies pending at discharge:  no





Medical Emergencies








.


Who to Call and When:





Medical Emergencies:  If at any time you feel your situation is an emergency, 

please call [phone] immediately.





.





Non-Emergent Contact


Non-Emergency issues call your:  Primary Care Provider, Surgeon





.


.








"Provider Documentation" section prepared by Kevin Hicks.








.





VTE Core Measure


Inpt VTE Proph given/why not?:  SCD's

## 2017-09-28 ENCOUNTER — HOSPITAL ENCOUNTER (OUTPATIENT)
Dept: HOSPITAL 45 - C.RAD | Age: 72
Discharge: HOME | End: 2017-09-28
Attending: THORACIC SURGERY (CARDIOTHORACIC VASCULAR SURGERY)
Payer: COMMERCIAL

## 2017-09-28 DIAGNOSIS — Z90.2: ICD-10-CM

## 2017-09-28 DIAGNOSIS — J85.2: Primary | ICD-10-CM

## 2017-09-28 NOTE — DIAGNOSTIC IMAGING REPORT
CHEST 2 VIEWS ROUTINE



CLINICAL HISTORY: J85.2 Abscess of lungs.p WEDGE RESECTION LEFT UPPER LOBE

ABNORMA    



COMPARISON STUDY:  9/24/2017



FINDINGS: The cardiac and mediastinal contours remain stable. There are

postsurgical changes of a midline sternotomy. There is extensive subcutaneous

emphysema. No pneumothorax is visualized. Evaluation the lung parenchyma is

difficult due to the subcutaneous changes emphysema.



IMPRESSION: No significant change from the prior study.







Electronically signed by:  Hebert Wolf M.D.

9/28/2017 1:18 PM



Dictated Date/Time:  9/28/2017 1:17 PM

## 2017-10-30 ENCOUNTER — HOSPITAL ENCOUNTER (OUTPATIENT)
Dept: HOSPITAL 45 - C.CTS | Age: 72
Discharge: HOME | End: 2017-10-30
Attending: THORACIC SURGERY (CARDIOTHORACIC VASCULAR SURGERY)
Payer: COMMERCIAL

## 2017-10-30 DIAGNOSIS — R91.8: ICD-10-CM

## 2017-10-30 DIAGNOSIS — J43.9: ICD-10-CM

## 2017-10-30 DIAGNOSIS — J85.2: Primary | ICD-10-CM

## 2017-10-30 DIAGNOSIS — Z90.2: ICD-10-CM

## 2017-10-30 NOTE — DIAGNOSTIC IMAGING REPORT
CT SCAN OF THE CHEST WITHOUT IV CONTRAST



CLINICAL HISTORY: Pulmonary abscess status post wedge resection.



COMPARISON STUDY:  Chest CT dated 9/10/2017. Chest x-ray dated 9/28/2017.



TECHNIQUE:  CT scan of the thorax was performed from the thoracic inlet to the

upper abdomen. Images are reviewed in the axial, sagittal, and coronal planes.

IV contrast was not administered for this examination as per the referring

clinician.  A dose lowering technique was utilized adhering to the principles of

ALARA.



CT DOSE: 555.24 mGycm



FINDINGS:



Thyroid: Imaged portions of the thyroid gland appear enlarged and heterogeneous.

A coarse calcification and subcentimeter low-attenuation nodule are suggested in

the right lobe.



Thoracic aorta: There is atherosclerotic calcification of the thoracic aorta,

which is normal in caliber and demonstrates standard 3-vessel arch anatomy.



Heart: The patient is status post midline sternotomy. The heart is normal in

size and without pericardial effusion. The coronary arteries are densely

calcified.



Lungs and pleural spaces: Emphysema is noted. There are postoperative changes

from lingular resection with associated scarring/atelectasis. Foci of

tree-in-bud nodularity with faint associated groundglass change are present

throughout both lungs, most confluence in the right upper lobe seen on image

#80. No fluid collection is suggested. No pleural effusion is identified. Foci

of linear atelectasis versus scarring are present throughout both lungs. The

trachea and central airways are clear. No pneumothorax is seen.



Mediastinum: A mildly enlarged AP window node on image #104 measures 1.2 cm in

short axis. Additional subcentimeter mediastinal nodes are identified.



Amita: Not well assessed without IV contrast.



Axillae: There is no axillary lymphadenopathy.



Upper abdomen: There is a tiny hiatal hernia. Partially visualized upper

abdominal viscera is otherwise within normal limits.



Skeletal structures: The skeletal structures are osteopenic. Degenerative change

is seen throughout the thoracic spine. No lytic or blastic bony lesions are

seen.



Soft tissues: Subcutaneous gas is present along the left chest wall and in the

left breast. This is likely related to recent surgery.





IMPRESSION:



1. Postoperative change is identified in the lingula. No fluid collection is

seen.



2. Emphysema.



3. There are numerous foci of tree-in-bud nodularity with faint associated

groundglass change. This is nonspecific and likely represents an

infectious/inflammatory pneumonitis. Clinical correlation will be required.



4. Subcutaneous emphysema is present along the left chest wall, likely related

to recent surgery.



5. A mildly enlarged AP window node is nonspecific and likely on a reactive

basis.



6. Additional findings as above.







Electronically signed by:  Carlitos Forman M.D.

10/30/2017 10:42 AM



Dictated Date/Time:  10/30/2017 10:34 AM

## 2018-02-15 ENCOUNTER — HOSPITAL ENCOUNTER (OUTPATIENT)
Dept: HOSPITAL 45 - C.CTS | Age: 73
Discharge: HOME | End: 2018-02-15
Attending: THORACIC SURGERY (CARDIOTHORACIC VASCULAR SURGERY)
Payer: COMMERCIAL

## 2018-02-15 DIAGNOSIS — Z90.2: ICD-10-CM

## 2018-02-15 DIAGNOSIS — J43.9: ICD-10-CM

## 2018-02-15 DIAGNOSIS — J85.2: Primary | ICD-10-CM

## 2018-02-15 NOTE — DIAGNOSTIC IMAGING REPORT
(CHEST) THORAX WITHOUT



CT DOSE: 488.94 mGy.cm



CLINICAL HISTORY: 72 years-old Female with J85.2 Abscess of lung.  Follow-up

study in a patient with history of prior pulmonary abscess and wedge resection  



TECHNIQUE: Multiaxial CT images of the chest were performed without contrast.  A

dose lowering technique was utilized adhering to the principles of ALARA.



COMPARISON: CT chest 10/30/2017



FINDINGS: 

Thyroid is heterogeneous with the right lobe larger than the left. 4 mm

calcification is also noted within the right thyroid lobe. Indeterminate 10 x 11

mm lymph node of the AP window appears slightly decreased in size from

comparison. No additional pathologic adenopathy about the chest identified.

Heart is normal in size without pericardial effusion. Prior median sternotomy

and CABG. Native coronary arterial disease is seen along with moderate

atherosclerosis of the aorta. No aortic aneurysm.



There is no pneumothorax or pleural effusion. Mild left hemidiaphragmatic

elevation. Mild to moderate centrilobular emphysematous changes are

redemonstrated. Mild bilateral bronchial wall thickening is noted with

multifocal areas of mucous plugging, notably within the lung bases.

Additionally, there is multifocal scattered groundglass and tree-in-bud

nodularity, greatest within the right upper lobe which has improved from prior

study. No focal airspace consolidation identified to suggest pneumonia.

Postoperative changes from prior wedge resection within the lingula. Mild linear

subsegmental pleural-parenchymal consolidation is noted adjacent to the suture

material and inferior portion of the major fissure suggesting postoperative

scarring which appears unchanged from prior.



No acute abnormality of the imaged upper abdomen. There is resolution of the

previously described subcutaneous emphysema along the left chest wall. Bones of

the chest appear grossly intact.





IMPRESSION: 

1. Emphysema with bilateral bronchial wall thickening compatible with

bronchitis. There is a mild mucoid impaction noted within bronchi of the lower

lobes.

2. Improved aeration of the lateral lungs with minimal persistent patchy

scattered groundglass and tree-in-bud nodularity suggesting persistent

infectious or inflammatory pneumonitis.

3. Postoperative changes from prior wedge resection of the lingula. No

postoperative fluid collections or acute abnormality identified related to the

area of prior surgery.

4. Resolution of the previously described subcutaneous emphysema of the left

chest wall.

5. Mildly decreased size of the indeterminate minimally enlarged AP window lymph

node.

6. Additional findings as above.







Electronically signed by:  Ulises Ndiaye M.D.

2/15/2018 12:25 PM



Dictated Date/Time:  2/15/2018 12:17 PM

## 2019-03-04 NOTE — PROGRESS NOTE
Internal Med Progress Note


Date of Service:


Sep 12, 2017.


Provider Documentation:





SUBJECTIVE:





Seen and examined at bedside.


States doing well


Denies chest pain, SOB, dizziness


Offers no complaints


Planned for robotic thoracoscopic evaluation tomorrow by CT surgery











OBJECTIVE:





Vital Signs-as noted below





Physical Exam:


General Appearance:Moderately built and nourished, no apparent distress


Head:  normocephalic, Atraumatic 


Eyes:  normal inspection, EOMI, PERRL


Neck:  supple, Trachea midline


Respiratory/Chest: Decreased breath sounds, CTA, + Left sided chest tube


Cardiovascular: S1, S2, No murmur


Abdomen/GI:Soft, Non tender, Bowel sounds present


Extremities/Musculoskelatal:normal inspection, no edema 


Neurologic/Psych:grossly no focal neurological deficits 


Skin:  normal color, warm





Lab data as noted below.


ASSESSMENT & PLAN:


Patient is a 71 yr female with H/O CAD/CABG, HTN, COPD presenting with left 

sided chest pain.








LEFT SIDED PNEUMOTHORAX


? LEFT LOWER LOBE PNEUMONIA VS. EFFUSION


chest tube readjusted after initial Chest tube placed in [place] [facility] which 

was displaced 


CT chest showed 30% left-sided pneumothorax, extensive subcutaneous emphysema 

over the left hemithorax and small L effusion with partial LLL atelectasis


Appreciate Pulmonology/CT surgery help 


Planed for robotic thoracoscopic eval tomorrow


Continue abx coverage for now


Appreciate ID Input


Blood/Sputum culture negative 


 





COPD


H/O chronic tobacco use 


Continue home inhalers   


pulmonology and CT surgery following 


counselled for smoking cessation 








CAD/CABG


hold ASA, Plavix for now as planned for thoracoscopic eval 


continue Losartan, Metoprolol


hold Lasix for now 








Hyponatremia/Hypokalemia:


Check Urine osmolality


Replace and monitor 











HTN


continue Losartan, Metoprolol








DVT PX


SCDs for now-chest tube in position 





Code Status


FULL CODE 





DISPOSITION


anticipate d/c home when medically stable


will need PT/OT eval prior to discharge 


follows with Family practice at [place] [facility] 


would like to follow up with Pulmonology Dr Frazier in [place]


Vital Signs:











  Date Time  Temp Pulse Resp B/P (MAP) Pulse Ox O2 Delivery O2 Flow Rate FiO2


 


9/12/17 12:00     95 Nasal Cannula 3.0 


 


9/12/17 11:14  94 15  91 Room Air  


 


9/12/17 08:22 36.4 71 18 147/84 (105) 92 Room Air  


 


9/12/17 08:21     92 Room Air  


 


9/12/17 08:00     97 Nasal Cannula 3.0 


 


9/12/17 07:51  59 18  98 Nasal Cannula 3.0 


 


9/12/17 05:30 36.8 73 18 110/63 (79) 94 Nasal Cannula 2.0 


 


9/12/17 04:10     96 Nasal Cannula 2.0 


 


9/12/17 00:05     96 Nasal Cannula 2.0 


 


9/11/17 23:45 36.8 74 20 154/76 (102) 96 Nasal Cannula  


 


9/11/17 23:07  70 18  97 Nasal Cannula 4.0 


 


9/11/17 20:21  73 18  95 Nasal Cannula 4.0 


 


9/11/17 20:15     97 Nasal Cannula 2.0 


 


9/11/17 19:45 36.9 81 20 168/70 (102) 97 Nasal Cannula 3.0 


 


9/11/17 19:02 36.7 68 22 148/82 (104) 97 Nasal Cannula 4.0 


 


9/11/17 16:00      Nasal Cannula 2.0 


 


9/11/17 15:33 36.7 78 22 125/79 (94) 97 High Flow Oxygen 8.0 





      Mask  


 


9/11/17 15:26  82 18  92 Nasal Cannula 4.0 








Lab Results:





Results Past 24 Hours








Test


  9/11/17


16:35 9/12/17


05:39 9/12/17


10:17 9/12/17


12:50 Range/Units


 


 


Urine Color YELLOW     


 


Urine Appearance CLEAR    CLEAR  


 


Urine pH 5.5    4.5-7.5  


 


Urine Specific Gravity 1.019    1.000-1.030  


 


Urine Protein NEG    NEG  


 


Urine Glucose (UA) NEG    NEG  


 


Urine Ketones TRACE    NEG  


 


Urine Occult Blood NEG    NEG  


 


Urine Nitrite NEG    NEG  


 


Urine Bilirubin NEG    NEG  


 


Urine Urobilinogen NEG    NEG  


 


Urine Leukocyte Esterase NEG    NEG  


 


White Blood Count  16.19   4.8-10.8  K/uL


 


Red Blood Count  3.41   4.2-5.4  M/uL


 


Hemoglobin  11.1   12.0-16.0  g/dL


 


Hematocrit  30.4   37-47  %


 


Mean Corpuscular Volume  89.1     fL


 


Mean Corpuscular Hemoglobin  32.6   25-34  pg


 


Mean Corpuscular Hemoglobin


Concent 


  36.5


  


  


  32-36  g/dl


 


 


RDW Standard Deviation  42.3   36.4-46.3  fL


 


RDW Coefficient of Variation  13.1   11.5-14.5  %


 


Platelet Count  270   130-400  K/uL


 


Mean Platelet Volume  9.0   7.4-10.4  fL


 


Sodium Level  127 126  136-145  mmol/L


 


Potassium Level  3.0 3.5  3.5-5.1  mmol/L


 


Chloride Level  93 93    mmol/L


 


Carbon Dioxide Level  27 28  21-32  mmol/L


 


Anion Gap  7.0 5.0  3-11  mmol/L


 


Blood Urea Nitrogen  15 14  7-18  mg/dl


 


Creatinine


  


  0.76


  0.77


  


  0.60-1.20


mg/dl


 


Est Creatinine Clear Calc


Drug Dose 


  70.9


  70.0


  


   ml/min


 


 


Estimated GFR (


American) 


  92.1


  90.7


  


   


 


 


Estimated GFR (Non-


American 


  79.5


  78.2


  


   


 


 


BUN/Creatinine Ratio  19.9 18.7  10-20  


 


Random Glucose  139 106  70-99  mg/dl


 


Calcium Level  8.3 8.6  8.5-10.1  mg/dl


 


Magnesium Level  1.8   1.8-2.4  mg/dl Scribe Attestation (For Scribes USE Only)... Attending Attestation (For Attendings USE Only).../Scribe Attestation (For Scribes USE Only)...